# Patient Record
Sex: FEMALE | Race: WHITE | NOT HISPANIC OR LATINO
[De-identification: names, ages, dates, MRNs, and addresses within clinical notes are randomized per-mention and may not be internally consistent; named-entity substitution may affect disease eponyms.]

---

## 2023-07-18 ENCOUNTER — APPOINTMENT (OUTPATIENT)
Dept: SURGERY | Facility: CLINIC | Age: 68
End: 2023-07-18

## 2023-08-22 PROBLEM — Z00.00 ENCOUNTER FOR PREVENTIVE HEALTH EXAMINATION: Status: ACTIVE | Noted: 2023-08-22

## 2023-11-07 ENCOUNTER — APPOINTMENT (OUTPATIENT)
Dept: SURGERY | Facility: CLINIC | Age: 68
End: 2023-11-07
Payer: MEDICARE

## 2023-11-07 VITALS — WEIGHT: 220 LBS | HEIGHT: 64 IN | BODY MASS INDEX: 37.56 KG/M2

## 2023-11-07 PROCEDURE — 99204 OFFICE O/P NEW MOD 45 MIN: CPT

## 2023-11-29 ENCOUNTER — OUTPATIENT (OUTPATIENT)
Dept: OUTPATIENT SERVICES | Facility: HOSPITAL | Age: 68
LOS: 1 days | End: 2023-11-29
Payer: MEDICARE

## 2023-11-29 VITALS
SYSTOLIC BLOOD PRESSURE: 162 MMHG | OXYGEN SATURATION: 98 % | RESPIRATION RATE: 18 BRPM | HEIGHT: 64 IN | HEART RATE: 80 BPM | WEIGHT: 218.92 LBS | TEMPERATURE: 98 F | DIASTOLIC BLOOD PRESSURE: 74 MMHG

## 2023-11-29 DIAGNOSIS — Z98.891 HISTORY OF UTERINE SCAR FROM PREVIOUS SURGERY: Chronic | ICD-10-CM

## 2023-11-29 DIAGNOSIS — Z01.818 ENCOUNTER FOR OTHER PREPROCEDURAL EXAMINATION: ICD-10-CM

## 2023-11-29 DIAGNOSIS — K43.9 VENTRAL HERNIA WITHOUT OBSTRUCTION OR GANGRENE: ICD-10-CM

## 2023-11-29 DIAGNOSIS — Z98.890 OTHER SPECIFIED POSTPROCEDURAL STATES: Chronic | ICD-10-CM

## 2023-11-29 LAB
ALBUMIN SERPL ELPH-MCNC: 4.3 G/DL — SIGNIFICANT CHANGE UP (ref 3.5–5.2)
ALBUMIN SERPL ELPH-MCNC: 4.3 G/DL — SIGNIFICANT CHANGE UP (ref 3.5–5.2)
ALP SERPL-CCNC: 139 U/L — HIGH (ref 30–115)
ALP SERPL-CCNC: 139 U/L — HIGH (ref 30–115)
ALT FLD-CCNC: 12 U/L — SIGNIFICANT CHANGE UP (ref 0–41)
ALT FLD-CCNC: 12 U/L — SIGNIFICANT CHANGE UP (ref 0–41)
ANION GAP SERPL CALC-SCNC: 12 MMOL/L — SIGNIFICANT CHANGE UP (ref 7–14)
ANION GAP SERPL CALC-SCNC: 12 MMOL/L — SIGNIFICANT CHANGE UP (ref 7–14)
APPEARANCE UR: CLEAR — SIGNIFICANT CHANGE UP
APPEARANCE UR: CLEAR — SIGNIFICANT CHANGE UP
APTT BLD: 33.2 SEC — SIGNIFICANT CHANGE UP (ref 27–39.2)
APTT BLD: 33.2 SEC — SIGNIFICANT CHANGE UP (ref 27–39.2)
AST SERPL-CCNC: 14 U/L — SIGNIFICANT CHANGE UP (ref 0–41)
AST SERPL-CCNC: 14 U/L — SIGNIFICANT CHANGE UP (ref 0–41)
BASOPHILS # BLD AUTO: 0.04 K/UL — SIGNIFICANT CHANGE UP (ref 0–0.2)
BASOPHILS # BLD AUTO: 0.04 K/UL — SIGNIFICANT CHANGE UP (ref 0–0.2)
BASOPHILS NFR BLD AUTO: 0.5 % — SIGNIFICANT CHANGE UP (ref 0–1)
BASOPHILS NFR BLD AUTO: 0.5 % — SIGNIFICANT CHANGE UP (ref 0–1)
BILIRUB SERPL-MCNC: 0.4 MG/DL — SIGNIFICANT CHANGE UP (ref 0.2–1.2)
BILIRUB SERPL-MCNC: 0.4 MG/DL — SIGNIFICANT CHANGE UP (ref 0.2–1.2)
BILIRUB UR-MCNC: NEGATIVE — SIGNIFICANT CHANGE UP
BILIRUB UR-MCNC: NEGATIVE — SIGNIFICANT CHANGE UP
BUN SERPL-MCNC: 8 MG/DL — LOW (ref 10–20)
BUN SERPL-MCNC: 8 MG/DL — LOW (ref 10–20)
CALCIUM SERPL-MCNC: 10 MG/DL — SIGNIFICANT CHANGE UP (ref 8.4–10.5)
CALCIUM SERPL-MCNC: 10 MG/DL — SIGNIFICANT CHANGE UP (ref 8.4–10.5)
CHLORIDE SERPL-SCNC: 94 MMOL/L — LOW (ref 98–110)
CHLORIDE SERPL-SCNC: 94 MMOL/L — LOW (ref 98–110)
CO2 SERPL-SCNC: 31 MMOL/L — SIGNIFICANT CHANGE UP (ref 17–32)
CO2 SERPL-SCNC: 31 MMOL/L — SIGNIFICANT CHANGE UP (ref 17–32)
COLOR SPEC: YELLOW — SIGNIFICANT CHANGE UP
COLOR SPEC: YELLOW — SIGNIFICANT CHANGE UP
CREAT SERPL-MCNC: <0.5 MG/DL — LOW (ref 0.7–1.5)
CREAT SERPL-MCNC: <0.5 MG/DL — LOW (ref 0.7–1.5)
DIFF PNL FLD: NEGATIVE — SIGNIFICANT CHANGE UP
DIFF PNL FLD: NEGATIVE — SIGNIFICANT CHANGE UP
EGFR: 103 ML/MIN/1.73M2 — SIGNIFICANT CHANGE UP
EGFR: 103 ML/MIN/1.73M2 — SIGNIFICANT CHANGE UP
EOSINOPHIL # BLD AUTO: 0.17 K/UL — SIGNIFICANT CHANGE UP (ref 0–0.7)
EOSINOPHIL # BLD AUTO: 0.17 K/UL — SIGNIFICANT CHANGE UP (ref 0–0.7)
EOSINOPHIL NFR BLD AUTO: 2.2 % — SIGNIFICANT CHANGE UP (ref 0–8)
EOSINOPHIL NFR BLD AUTO: 2.2 % — SIGNIFICANT CHANGE UP (ref 0–8)
GLUCOSE SERPL-MCNC: 88 MG/DL — SIGNIFICANT CHANGE UP (ref 70–99)
GLUCOSE SERPL-MCNC: 88 MG/DL — SIGNIFICANT CHANGE UP (ref 70–99)
GLUCOSE UR QL: NEGATIVE MG/DL — SIGNIFICANT CHANGE UP
GLUCOSE UR QL: NEGATIVE MG/DL — SIGNIFICANT CHANGE UP
HCT VFR BLD CALC: 48.2 % — HIGH (ref 37–47)
HCT VFR BLD CALC: 48.2 % — HIGH (ref 37–47)
HGB BLD-MCNC: 15.9 G/DL — SIGNIFICANT CHANGE UP (ref 12–16)
HGB BLD-MCNC: 15.9 G/DL — SIGNIFICANT CHANGE UP (ref 12–16)
IMM GRANULOCYTES NFR BLD AUTO: 0.5 % — HIGH (ref 0.1–0.3)
IMM GRANULOCYTES NFR BLD AUTO: 0.5 % — HIGH (ref 0.1–0.3)
INR BLD: 1 RATIO — SIGNIFICANT CHANGE UP (ref 0.65–1.3)
INR BLD: 1 RATIO — SIGNIFICANT CHANGE UP (ref 0.65–1.3)
KETONES UR-MCNC: NEGATIVE MG/DL — SIGNIFICANT CHANGE UP
KETONES UR-MCNC: NEGATIVE MG/DL — SIGNIFICANT CHANGE UP
LEUKOCYTE ESTERASE UR-ACNC: NEGATIVE — SIGNIFICANT CHANGE UP
LEUKOCYTE ESTERASE UR-ACNC: NEGATIVE — SIGNIFICANT CHANGE UP
LYMPHOCYTES # BLD AUTO: 1.79 K/UL — SIGNIFICANT CHANGE UP (ref 1.2–3.4)
LYMPHOCYTES # BLD AUTO: 1.79 K/UL — SIGNIFICANT CHANGE UP (ref 1.2–3.4)
LYMPHOCYTES # BLD AUTO: 23.6 % — SIGNIFICANT CHANGE UP (ref 20.5–51.1)
LYMPHOCYTES # BLD AUTO: 23.6 % — SIGNIFICANT CHANGE UP (ref 20.5–51.1)
MCHC RBC-ENTMCNC: 29 PG — SIGNIFICANT CHANGE UP (ref 27–31)
MCHC RBC-ENTMCNC: 29 PG — SIGNIFICANT CHANGE UP (ref 27–31)
MCHC RBC-ENTMCNC: 33 G/DL — SIGNIFICANT CHANGE UP (ref 32–37)
MCHC RBC-ENTMCNC: 33 G/DL — SIGNIFICANT CHANGE UP (ref 32–37)
MCV RBC AUTO: 88 FL — SIGNIFICANT CHANGE UP (ref 81–99)
MCV RBC AUTO: 88 FL — SIGNIFICANT CHANGE UP (ref 81–99)
MONOCYTES # BLD AUTO: 0.45 K/UL — SIGNIFICANT CHANGE UP (ref 0.1–0.6)
MONOCYTES # BLD AUTO: 0.45 K/UL — SIGNIFICANT CHANGE UP (ref 0.1–0.6)
MONOCYTES NFR BLD AUTO: 5.9 % — SIGNIFICANT CHANGE UP (ref 1.7–9.3)
MONOCYTES NFR BLD AUTO: 5.9 % — SIGNIFICANT CHANGE UP (ref 1.7–9.3)
NEUTROPHILS # BLD AUTO: 5.08 K/UL — SIGNIFICANT CHANGE UP (ref 1.4–6.5)
NEUTROPHILS # BLD AUTO: 5.08 K/UL — SIGNIFICANT CHANGE UP (ref 1.4–6.5)
NEUTROPHILS NFR BLD AUTO: 67.3 % — SIGNIFICANT CHANGE UP (ref 42.2–75.2)
NEUTROPHILS NFR BLD AUTO: 67.3 % — SIGNIFICANT CHANGE UP (ref 42.2–75.2)
NITRITE UR-MCNC: NEGATIVE — SIGNIFICANT CHANGE UP
NITRITE UR-MCNC: NEGATIVE — SIGNIFICANT CHANGE UP
NRBC # BLD: 0 /100 WBCS — SIGNIFICANT CHANGE UP (ref 0–0)
NRBC # BLD: 0 /100 WBCS — SIGNIFICANT CHANGE UP (ref 0–0)
PH UR: 7.5 — SIGNIFICANT CHANGE UP (ref 5–8)
PH UR: 7.5 — SIGNIFICANT CHANGE UP (ref 5–8)
PLATELET # BLD AUTO: 248 K/UL — SIGNIFICANT CHANGE UP (ref 130–400)
PLATELET # BLD AUTO: 248 K/UL — SIGNIFICANT CHANGE UP (ref 130–400)
PMV BLD: 9.8 FL — SIGNIFICANT CHANGE UP (ref 7.4–10.4)
PMV BLD: 9.8 FL — SIGNIFICANT CHANGE UP (ref 7.4–10.4)
POTASSIUM SERPL-MCNC: 4.1 MMOL/L — SIGNIFICANT CHANGE UP (ref 3.5–5)
POTASSIUM SERPL-MCNC: 4.1 MMOL/L — SIGNIFICANT CHANGE UP (ref 3.5–5)
POTASSIUM SERPL-SCNC: 4.1 MMOL/L — SIGNIFICANT CHANGE UP (ref 3.5–5)
POTASSIUM SERPL-SCNC: 4.1 MMOL/L — SIGNIFICANT CHANGE UP (ref 3.5–5)
PROT SERPL-MCNC: 6.7 G/DL — SIGNIFICANT CHANGE UP (ref 6–8)
PROT SERPL-MCNC: 6.7 G/DL — SIGNIFICANT CHANGE UP (ref 6–8)
PROT UR-MCNC: NEGATIVE MG/DL — SIGNIFICANT CHANGE UP
PROT UR-MCNC: NEGATIVE MG/DL — SIGNIFICANT CHANGE UP
PROTHROM AB SERPL-ACNC: 11.4 SEC — SIGNIFICANT CHANGE UP (ref 9.95–12.87)
PROTHROM AB SERPL-ACNC: 11.4 SEC — SIGNIFICANT CHANGE UP (ref 9.95–12.87)
RBC # BLD: 5.48 M/UL — HIGH (ref 4.2–5.4)
RBC # BLD: 5.48 M/UL — HIGH (ref 4.2–5.4)
RBC # FLD: 14.8 % — HIGH (ref 11.5–14.5)
RBC # FLD: 14.8 % — HIGH (ref 11.5–14.5)
SODIUM SERPL-SCNC: 137 MMOL/L — SIGNIFICANT CHANGE UP (ref 135–146)
SODIUM SERPL-SCNC: 137 MMOL/L — SIGNIFICANT CHANGE UP (ref 135–146)
SP GR SPEC: 1.01 — SIGNIFICANT CHANGE UP (ref 1–1.03)
SP GR SPEC: 1.01 — SIGNIFICANT CHANGE UP (ref 1–1.03)
UROBILINOGEN FLD QL: 0.2 MG/DL — SIGNIFICANT CHANGE UP (ref 0.2–1)
UROBILINOGEN FLD QL: 0.2 MG/DL — SIGNIFICANT CHANGE UP (ref 0.2–1)
WBC # BLD: 7.57 K/UL — SIGNIFICANT CHANGE UP (ref 4.8–10.8)
WBC # BLD: 7.57 K/UL — SIGNIFICANT CHANGE UP (ref 4.8–10.8)
WBC # FLD AUTO: 7.57 K/UL — SIGNIFICANT CHANGE UP (ref 4.8–10.8)
WBC # FLD AUTO: 7.57 K/UL — SIGNIFICANT CHANGE UP (ref 4.8–10.8)

## 2023-11-29 PROCEDURE — 93010 ELECTROCARDIOGRAM REPORT: CPT

## 2023-11-29 PROCEDURE — 85610 PROTHROMBIN TIME: CPT

## 2023-11-29 PROCEDURE — 80053 COMPREHEN METABOLIC PANEL: CPT

## 2023-11-29 PROCEDURE — 36415 COLL VENOUS BLD VENIPUNCTURE: CPT

## 2023-11-29 PROCEDURE — 81003 URINALYSIS AUTO W/O SCOPE: CPT

## 2023-11-29 PROCEDURE — 99214 OFFICE O/P EST MOD 30 MIN: CPT | Mod: 25

## 2023-11-29 PROCEDURE — 85730 THROMBOPLASTIN TIME PARTIAL: CPT

## 2023-11-29 PROCEDURE — 85025 COMPLETE CBC W/AUTO DIFF WBC: CPT

## 2023-11-29 PROCEDURE — 93005 ELECTROCARDIOGRAM TRACING: CPT

## 2023-11-29 NOTE — H&P PST ADULT - HISTORY OF PRESENT ILLNESS
PATIENT CURRENTLY DENIES CHEST PAIN PALPITATIONS,  DYSURIA, OR UPPER RESPIRATORY INFECTION IN PAST 2 WEEKS  PT IS DYSPNEA WITH MINIMAL EXERTION; SHE USES A WALKER FOR STABILITY       Denies travel outside the [place] in the past 30 days  Patient denies any signs or symptoms of COVID 19 and denies contact with known positive individuals.         Anesthesia Alert  YES--Difficult Airway CLASS IV  NO--History of neck surgery or radiation  NO--Limited ROM of neck  NO--History of Malignant hyperthermia  NO--No personal or family history of Pseudocholinesterase deficiency.  NO--Prior Anesthesia Complication  NO--Latex Allergy  YES--Loose teeth FULL DENTURES   NO--History of Rheumatoid Arthritis  NO--Bleeding risk  NO--LATISHA  NO--Other_____    [facility] Activity Status Index (DASI)   RESULT SUMMARY:  7.2 points  The higher the score (maximum 58.2), the higher the functional status.    3.63 METs    INPUTS:  Take care of self —> 2.75 = Yes  Walk indoors —> 1.75 = Yes  Walk 1&ndash;2 blocks on level ground —> 0 = No  Climb a flight of stairs or walk up a hill —> 0 = No  Run a short distance —> 0 = No  Do light work around the house —> 2.7 = Yes  Do moderate work around the house —> 0 = No  Do heavy work around the house —> 0 = No  Do yardwork —> 0 = No  Have sexual relations —> 0 = No  Participate in moderate recreational activities —> 0 = No  Participate in strenuous sports —> 0 = No    Revised Cardiac Risk Index for Pre-Operative Risk  RESULT SUMMARY:  1 points  Class II Risk    6.0 %  30-day risk of death, MI, or cardiac arrest    INPUTS:  Elevated-risk surgery —> 0 = No  History of ischemic heart disease —> 0 = No  History of congestive heart failure —> 1 = Yes  History of cerebrovascular disease —> 0 = No  Pre-operative treatment with insulin —> 0 = No  Pre-operative creatinine >2 mg/dL / 176.8 µmol/L —> 0 = No        PT DENIES ANY RASHES, ABRASION, OR OPEN WOUNDS OR CUTS    AS PER THE PT, THIS IS HIS/HER COMPLETE MEDICAL AND SURGICAL HX, INCLUDING MEDICATIONS PRESCRIBED AND OVER THE COUNTER    Patient verbalized understanding of instructions and was given the opportunity to ask questions and have them answered.    pt denies any suicidal ideation or thoughts, pt states not a threat to self or others

## 2023-11-29 NOTE — H&P PST ADULT - MUSCULOSKELETAL
USES WALKER/ROM intact/strength 5/5 bilateral upper extremities/strength 5/5 bilateral lower extremities/abnormal gait

## 2023-11-29 NOTE — H&P PST ADULT - REASON FOR ADMISSION
66 Y/O F WITH PMHX BIPOLAR DISORDER, ASTHMA, COPD, +SMOKER, ?CHF, SCHEDULED FOR PAST FOR RECURRENT PERIUMBILICAL VENTRAL HERNIA REPAIR WITH MESH UNDER LSB WITH DR LINK ON 12/13/23. PT REPORTS RECENT UMBILICAL HERNIA REPAIR 6/2023 AT [email or web address] SHE STATES THE PAIN IN HER ABDOMEN HAS BEEN CONSTANT SINCE SURGERY AND THE HERNIA HAS RECURRED. 68 Y/O F WITH PMHX BIPOLAR DISORDER, ASTHMA, COPD, +SMOKER, ?CHF, SCHEDULED FOR PAST FOR RECURRENT PERIUMBILICAL VENTRAL HERNIA REPAIR WITH MESH UNDER LSB WITH DR LINK ON 12/13/23. PT REPORTS RECENT UMBILICAL HERNIA REPAIR 6/2023 AT [facility]. SHE STATES THE PAIN IN HER ABDOMEN HAS BEEN CONSTANT SINCE SURGERY AND THE HERNIA HAS RECURRED. 68 Y/O F WITH PMHX BIPOLAR DISORDER, ASTHMA, COPD, +SMOKER, ?CHF, SCHEDULED FOR PAST FOR RECURRENT PERIUMBILICAL VENTRAL HERNIA REPAIR WITH MESH UNDER LSB WITH DR LINK ON 12/13/23. PT REPORTS RECENT UMBILICAL HERNIA REPAIR 6/2023 AT [facility]. SHE STATES THE PAIN IN HER ABDOMEN HAS BEEN CONSTANT SINCE SURGERY AND THE HERNIA HAS RECURRED.

## 2023-11-29 NOTE — H&P PST ADULT - PRO ARRIVE FROM
[facility] residence/assisted living facility [facility] residence/assisted living facility [facility] residence/assisted living facility

## 2023-11-29 NOTE — H&P PST ADULT - NSICDXPASTMEDICALHX_GEN_ALL_CORE_FT
PAST MEDICAL HISTORY:  Asthma     Bipolar disorder     CHF (congestive heart failure)     COPD (chronic obstructive pulmonary disease)     Lymphedema

## 2023-11-29 NOTE — H&P PST ADULT - NSANTHOSAYNRD_GEN_A_CORE
No. LATISHA screening performed.  STOP BANG Legend: 0-2 = LOW Risk; 3-4 = INTERMEDIATE Risk; 5-8 = HIGH Risk

## 2023-11-30 DIAGNOSIS — Z01.818 ENCOUNTER FOR OTHER PREPROCEDURAL EXAMINATION: ICD-10-CM

## 2023-11-30 DIAGNOSIS — K43.9 VENTRAL HERNIA WITHOUT OBSTRUCTION OR GANGRENE: ICD-10-CM

## 2023-12-12 NOTE — ASU PATIENT PROFILE, ADULT - FALL HARM RISK - HARM RISK INTERVENTIONS
Assistance OOB with selected safe patient handling equipment/Communicate Risk of Fall with Harm to all staff/Discuss with provider need for PT consult/Monitor gait and stability/Provide patient with walking aids - walker, cane, crutches/Reinforce activity limits and safety measures with patient and family/Tailored Fall Risk Interventions/Visual Cue: Yellow wristband and red socks/Bed in lowest position, wheels locked, appropriate side rails in place/Call bell, personal items and telephone in reach/Instruct patient to call for assistance before getting out of bed or chair/Non-slip footwear when patient is out of bed/[place] to call system/Physically safe environment - no spills, clutter or unnecessary equipment/Purposeful Proactive Rounding/Room/bathroom lighting operational, light cord in reach Assistance OOB with selected safe patient handling equipment/Communicate Risk of Fall with Harm to all staff/Discuss with provider need for PT consult/Monitor gait and stability/Provide patient with walking aids - walker, cane, crutches/Reinforce activity limits and safety measures with patient and family/Tailored Fall Risk Interventions/Visual Cue: Yellow wristband and red socks/Bed in lowest position, wheels locked, appropriate side rails in place/Call bell, personal items and telephone in reach/Instruct patient to call for assistance before getting out of bed or chair/Non-slip footwear when patient is out of bed/[place] to call system/Physically safe environment - no spills, clutter or unnecessary equipment/Purposeful Proactive Rounding/Room/bathroom lighting operational, light cord in reach

## 2023-12-13 ENCOUNTER — APPOINTMENT (OUTPATIENT)
Dept: SURGERY | Facility: AMBULATORY SURGERY CENTER | Age: 68
End: 2023-12-13
Payer: MEDICARE

## 2023-12-13 ENCOUNTER — TRANSCRIPTION ENCOUNTER (OUTPATIENT)
Age: 68
End: 2023-12-13

## 2023-12-13 ENCOUNTER — RESULT REVIEW (OUTPATIENT)
Age: 68
End: 2023-12-13

## 2023-12-13 ENCOUNTER — OUTPATIENT (OUTPATIENT)
Dept: OUTPATIENT SERVICES | Facility: HOSPITAL | Age: 68
LOS: 1 days | Discharge: HOME | End: 2023-12-13
Payer: MEDICARE

## 2023-12-13 VITALS
HEART RATE: 77 BPM | WEIGHT: 218.04 LBS | DIASTOLIC BLOOD PRESSURE: 69 MMHG | SYSTOLIC BLOOD PRESSURE: 145 MMHG | HEIGHT: 64 IN

## 2023-12-13 VITALS
HEART RATE: 60 BPM | OXYGEN SATURATION: 99 % | SYSTOLIC BLOOD PRESSURE: 129 MMHG | RESPIRATION RATE: 18 BRPM | DIASTOLIC BLOOD PRESSURE: 61 MMHG

## 2023-12-13 DIAGNOSIS — F17.210 NICOTINE DEPENDENCE, CIGARETTES, UNCOMPLICATED: ICD-10-CM

## 2023-12-13 DIAGNOSIS — N73.6 FEMALE PELVIC PERITONEAL ADHESIONS (POSTINFECTIVE): ICD-10-CM

## 2023-12-13 DIAGNOSIS — K43.9 VENTRAL HERNIA WITHOUT OBSTRUCTION OR GANGRENE: ICD-10-CM

## 2023-12-13 DIAGNOSIS — Z98.891 HISTORY OF UTERINE SCAR FROM PREVIOUS SURGERY: Chronic | ICD-10-CM

## 2023-12-13 DIAGNOSIS — J44.9 CHRONIC OBSTRUCTIVE PULMONARY DISEASE, UNSPECIFIED: ICD-10-CM

## 2023-12-13 DIAGNOSIS — K43.2 INCISIONAL HERNIA WITHOUT OBSTRUCTION OR GANGRENE: ICD-10-CM

## 2023-12-13 DIAGNOSIS — F31.9 BIPOLAR DISORDER, UNSPECIFIED: ICD-10-CM

## 2023-12-13 DIAGNOSIS — Z88.0 ALLERGY STATUS TO PENICILLIN: ICD-10-CM

## 2023-12-13 DIAGNOSIS — I50.9 HEART FAILURE, UNSPECIFIED: ICD-10-CM

## 2023-12-13 DIAGNOSIS — Z98.890 OTHER SPECIFIED POSTPROCEDURAL STATES: Chronic | ICD-10-CM

## 2023-12-13 PROCEDURE — 49616 RPR AA HRN RCR 3-10 NCR/STRN: CPT

## 2023-12-13 PROCEDURE — 88304 TISSUE EXAM BY PATHOLOGIST: CPT | Mod: 26

## 2023-12-13 PROCEDURE — 88304 TISSUE EXAM BY PATHOLOGIST: CPT

## 2023-12-13 PROCEDURE — 20525 RMVL FB MUSC/TDN DEEP/COMP: CPT | Mod: XS

## 2023-12-13 PROCEDURE — C1781: CPT

## 2023-12-13 RX ORDER — SODIUM CHLORIDE 9 MG/ML
1000 INJECTION, SOLUTION INTRAVENOUS
Refills: 0 | Status: DISCONTINUED | OUTPATIENT
Start: 2023-12-13 | End: 2023-12-13

## 2023-12-13 RX ORDER — ARIPIPRAZOLE 15 MG/1
1 TABLET ORAL
Refills: 0 | DISCHARGE

## 2023-12-13 RX ORDER — HYDROMORPHONE HYDROCHLORIDE 2 MG/ML
0.5 INJECTION INTRAMUSCULAR; INTRAVENOUS; SUBCUTANEOUS
Refills: 0 | Status: DISCONTINUED | OUTPATIENT
Start: 2023-12-13 | End: 2023-12-13

## 2023-12-13 RX ORDER — ONDANSETRON 8 MG/1
4 TABLET, FILM COATED ORAL ONCE
Refills: 0 | Status: DISCONTINUED | OUTPATIENT
Start: 2023-12-13 | End: 2023-12-13

## 2023-12-13 RX ORDER — TRIHEXYPHENIDYL HCL 2 MG
2 TABLET ORAL
Refills: 0 | DISCHARGE

## 2023-12-13 NOTE — ASU DISCHARGE PLAN (ADULT/PEDIATRIC) - PROVIDER TOKENS
PROVIDER:[TOKEN:[[id number]],SCHEDULEDAPPT:[12/20/2023],SCHEDULEDAPPTTIME:[02:00 PM]] PROVIDER:[TOKEN:[[id number]],SCHEDULEDAPPT:[12/20/2023],SCHEDULEDAPPTTIME:[02:00 PM]]

## 2023-12-13 NOTE — ASU PREOP CHECKLIST - COMMENTS
pt was sucking on a sponge when admitted - says she has dry mouth . advised pt to not suck on the sponge. pt threw away @ 10:45 am

## 2023-12-13 NOTE — BRIEF OPERATIVE NOTE - NSICDXBRIEFPOSTOP_GEN_ALL_CORE_FT
POST-OP DIAGNOSIS:  Recurrent ventral hernia with incarceration 13-Dec-2023 13:48:09  Kodi Noguera  Suture granuloma 13-Dec-2023 13:48:16  Kodi Noguera

## 2023-12-13 NOTE — PRE-ANESTHESIA EVALUATION ADULT - BP NONINVASIVE DIASTOLIC (MM HG)
Bacterial Vaginosis    You have a vaginal infection called bacterial vaginosis (BV). Both good and bad bacteria are present in a healthy vagina. BV occurs when these bacteria get out of balance. The number of bad bacteria increase. And the number of good bacteria decrease. Although BV is associated with sexual activity, it is not a sexually transmitted disease.  BV may or may not cause symptoms. If symptoms do occur, they can include:  · Thin, gray, milky-white, or sometimes green discharge  · Unpleasant odor or “fishy” smell  · Itching, burning, or pain in or around the vagina  It is not known what causes BV, but certain factors can make the problem more likely. This can include:  · Douching  · Having sex with a new partner  · Having sex with more than one partner  BV will sometimes go away on its own. But treatment is usually recommended. This is because untreated BV can increase the risk of more serious health problems such as:  · Pelvic inflammatory disease (PID)  ·  delivery (giving birth to a baby early if you’re pregnant)  · HIV and certain other sexually transmitted diseases (STDs)  · Infection after surgery on the reproductive organs  Home care  General care  · BV is most often treated with medicines called antibiotics. These may be given as pills or as a vaginal cream. If antibiotics are prescribed, be sure to use them exactly as directed. Also, be sure to complete all of the medicine, even if your symptoms go away.  · Don't douche or having sex during treatment.  · If you have sex with a female partner, ask your healthcare provider if she should also be treated.  Prevention  · Don't douche.  · Don't have sex. If you do have sex, then take steps to lower your risk:  ? Use condoms when having sex.  ? Limit the number of sexual partners you have.  Follow-up care  Follow up with your healthcare provider, or as advised.  When to seek medical advice  Call your healthcare provider right away  if:  · You have a fever of 100.4ºF (38ºC) or higher, or as directed by your provider.  · Your symptoms worsen, or they don’t go away within a few days of starting treatment.  · You have new pain in the lower belly or pelvic region.  · You have side effects that bother you or a reaction to the pills or cream you’re prescribed.  · You or any partners you have sex with have new symptoms, such as a rash, joint pain, or sores.  Date Last Reviewed: 10/1/2017  © 6124-7057 [employer]. [address] [place] [address]. All rights reserved. This information is not intended as a substitute for professional medical care. Always follow your healthcare professional's instructions.          Yeast Infection (Candida Vaginal Infection)    You have a Candida vaginal infection. This is also known as a yeast infection. It is most often caused by a type of yeast (fungus) called Candida. Candida are normally found in the vagina. But if they increase in number, this can lead to infection and cause symptoms.  Symptoms of a yeast infection can include:  · Clumpy or thin, white discharge, which may look like cottage cheese  · Itching or burning  · Burning with urination  Certain factors can make a yeast infection more likely. These can include:  · Taking certain medicines, such as antibiotics or birth control pills  · Pregnancy  · Diabetes  · Weak immune system  A yeast infection is most often treated with antifungal medicine. This may be given as a vaginal cream or pills you take by mouth. Treatment may last for about 1 to 7 days. Women with severe or recurrent infections may need longer courses of treatment.  Home care  · If you’re prescribed medicine, be sure to use it as directed. Finish all of the medicine, even if your symptoms go away. Note: Don’t try to treat yourself using over-the-counter products without talking to your provider first. He or she will let you know if this is a good option for you.  · Ask  your provider what steps you can take to help reduce your risk of having a yeast infection in the future.  Follow-up care  Follow up with your healthcare provider, or as directed.  When to seek medical advice  Call your healthcare provider right away if:  · You have a fever of 100.4ºF (38ºC) or higher, or as directed by your provider.  · Your symptoms worsen, or they don’t go away within a few days of starting treatment.  · You have new pain in the lower belly or pelvic region.  · You have side effects that bother you or a reaction to the cream or pills you’re prescribed.  · You or any partners you have sex with have new symptoms, such as a rash, joint pain, or sores.  Date Last Reviewed: 10/1/2017  © 8794-3430 [employer]. [address] [place] [address], [place] [address]. All rights reserved. This information is not intended as a substitute for professional medical care. Always follow your healthcare professional's instructions.         69

## 2023-12-13 NOTE — ASU DISCHARGE PLAN (ADULT/PEDIATRIC) - DO NOT DRIVE IF TAKING PAIN MEDICATION
Patient had to get to class and was not able to be seen.   Kasumi-sou results for medication to be scanned to chart.   Will send Wellbutrin 150 mg XL to be taken totaling 300 mg  Xanax sent as needed until she can reschedule to discuss further. According to the results of Kasumi-sou Buspar should have worked well for her.   
NULL

## 2023-12-13 NOTE — BRIEF OPERATIVE NOTE - NSICDXBRIEFPROCEDURE_GEN_ALL_CORE_FT
PROCEDURES:  Repair of anterior abdominal hernia(s) (ie, epigastric, incisional, ventral, umbilical, Spigelian), 13-Dec-2023 13:48:24  Kodi Noguera  Removal, foreign body, muscle, deep 13-Dec-2023 13:48:53  Kodi Noguera

## 2023-12-13 NOTE — ASU DISCHARGE PLAN (ADULT/PEDIATRIC) - CARE PROVIDER_API CALL
Kodi Noguera  Surgery  [address] [place] [id number]  Phone: [phone]  Fax: [phone]  Scheduled Appointment: 12/20/2023 02:00 PM   Kodi Noguera  Surgery  [address] [place] [id number]  Phone: [phone]  Fax: [phone]  Scheduled Appointment: 12/20/2023 02:00 PM

## 2023-12-13 NOTE — ASU DISCHARGE PLAN (ADULT/PEDIATRIC) - COMMENTS
- You will see [facility] Physician Assistant, Venice Mustafa, at your postoperative visit noted above.

## 2023-12-13 NOTE — ASU PREOP CHECKLIST - HEIGHT IN FEET
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  IV antibiotics switched from ceftriaxone to vancomycin due to concern for MARIBEL  Per ID, consider primary odontogenic source given poor dentition  JESSICA, 5/19/23: EF 65%  Normal systolic fxn  Dilated LA  No PFO  No thrombous in DAREN  AV leaflets moderately thickened, moderately calcified, severely reduced mobility, mod regurg, mod to severe stenosis  AV vegetations (3 small, mobile vegetations)- suspect primary odontogenic source  No definitive occult intra-abdominal source  WBC 12 94 (history of persistent leukocytosis)  6/12: CRP 42 1 (prev 19 2), ESR 42 (prev 36)  Afebrile    Plan:  · Per ID OP, plan for total 6 weeks of therapy from bacteremia clearance through 6/30/23  · Discontinued vancomycin in setting of negative MRSA  · ID consulted, recs appreciated  · No indications to check bcx and MRSA at this time  · Maintain current RUE PICC line until last IV abx  · Upon d/c, will need to f/u [facility], ID, cardiology/CT surgery  5

## 2023-12-13 NOTE — ASU DISCHARGE PLAN (ADULT/PEDIATRIC) - ASU DC SPECIAL INSTRUCTIONSFT
Diet    Eat light on the day of surgery. Nausea and vomiting can occur after anesthesia,   but usually resolve within 24 hours.  Resume normal diet the following day.      Activity    Rest!  No heavy lifting or strenuous activity.    Medications    Ibuprofen (Advil, Motrin), Naprosyn (Aleve) and/or Extra-Strength Tylenol for pain.  Tramadol for severe pain only.  Your prescription was sent electronically to your pharmacy.  Remember, Tramadol is a strong narcotic-like pain reliever which can cause drowsiness, upset stomach and constipation.  It should always be taken with food.  You can use stool softener (Mineral Oil) or laxative (MiraLax or Dulcolax) if constipated.  An antibiotic is given during surgery.  No antibiotic needed at home.  Resume all previous medications.  Resume blood thinners the day after surgery unless told otherwise.    Wound Care    Leave surgical dressing in place.  May shower (dressing is waterproof.)  No pool, ocean, lake, hot-tub or bath for 3 weeks. If you were given an abdominal binder, please wear it as much as possible (day & night) for 2 weeks, but you must remove it for showers.  Ice packs to the area intermittently for several days help with pain and swelling.  Bruising (“black and blue”) is common.  Treatment is…Ice & Rest.       - You will see [facility] Physician Assistant, Venice Mustafa, at your postoperative visit noted below.

## 2023-12-13 NOTE — BRIEF OPERATIVE NOTE - VENOUS THROMBOEMBOLISM PROPHYLAXIS THERAPY
DAMARI GUERRERO  MRN-[id number]  Patient is a 74y old  Female who presents with a chief complaint of Vomiting (24 Oct 2022 10:56)    HPI:  75 y/o F with pmhx of COPD on home O2(3L) with severe pHTN, MVR/TVR , AF s/p ablation,  OHS/MIGUEL on home biPAP, HTN, HLD who presents with one week of vomiting with poor oral intake also complaining of SOB and chest pain.  She also states she has been taking prednisone on/off for the past 3 months for chest tightness and does endorse some weight gain.     In the ED pt found to have A Flutter and given Amio 400 po and Dig loaded.  (23 Oct 2022 04:23)      Hospital Course:    24 HOUR EVENTS:    REVIEW OF SYSTEMS:    CONSTITUTIONAL: No weakness, fevers or chills  EYES/ENT: No visual changes;  No vertigo or throat pain   NECK: No pain or stiffness  RESPIRATORY: No cough, wheezing, hemoptysis; No shortness of breath  CARDIOVASCULAR: No chest pain or palpitations  GASTROINTESTINAL: No abdominal or epigastric pain. No nausea, vomiting, or hematemesis; No diarrhea or constipation. No melena or hematochezia.  GENITOURINARY: No dysuria, frequency or hematuria  NEUROLOGICAL: No numbness or weakness  SKIN: No itching, rashes      ICU Vital Signs Last 24 Hrs  T(C): 36.7 (24 Oct 2022 16:00), Max: 36.7 (24 Oct 2022 12:00)  T(F): 98.1 (24 Oct 2022 16:00), Max: 98.1 (24 Oct 2022 12:00)  HR: 125 (24 Oct 2022 18:00) (64 - 130)  BP: 93/58 (24 Oct 2022 17:00) (92/62 - 135/83)  BP(mean): 70 (24 Oct 2022 17:00) (70 - 105)  ABP: --  ABP(mean): --  RR: 32 (24 Oct 2022 18:00) (15 - 67)  SpO2: 88% (24 Oct 2022 18:00) (86% - 100%)    O2 Parameters below as of 24 Oct 2022 03:00  Patient On (Oxygen Delivery Method): nasal cannula w/ humidification  O2 Flow (L/min): 4        Mode: AC/ CMV (Assist Control/ Continuous Mandatory Ventilation), RR (machine): 16, TV (machine): 500, FiO2: 40, PEEP: 6, ITime: 1  CVP(mm Hg): --  CO: --  CI: --  PA: --  PA(mean): --  PA(direct): --  PCWP: --  LA: --  RA: --  SVR: --  SVRI: --  PVR: --  PVRI: --  I&O's Summary    23 Oct 2022 07:01  -  24 Oct 2022 07:00  --------------------------------------------------------  IN: 1674 mL / OUT: 2050 mL / NET: -376 mL    24 Oct 2022 07:01  -  24 Oct 2022 20:25  --------------------------------------------------------  IN: 804 mL / OUT: 2850 mL / NET: -2046 mL        CAPILLARY BLOOD GLUCOSE    CAPILLARY BLOOD GLUCOSE      POCT Blood Glucose.: 129 mg/dL (24 Oct 2022 12:28)      PHYSICAL EXAM:  GENERAL: No acute distress, well-developed  HEAD:  Atraumatic, Normocephalic  EYES: EOMI, PERRLA, conjunctiva and sclera clear  NECK: Supple, no lymphadenopathy, no JVD  CHEST/LUNG: CTAB; No wheezes, rales, or rhonchi  HEART: Regular rate and rhythm. Normal S1/S2. No murmurs, rubs, or gallops  ABDOMEN: Soft, non-tender, non-distended; normal bowel sounds, no organomegaly  EXTREMITIES:  2+ peripheral pulses b/l, No clubbing, cyanosis, or edema  NEUROLOGY: A&O x 3, no focal deficits  SKIN: No rashes or lesions    ============================I/O===========================   I&O's Detail    23 Oct 2022 07:01  -  24 Oct 2022 07:00  --------------------------------------------------------  IN:    Heparin: 324 mL    Oral Fluid: 1350 mL  Total IN: 1674 mL    OUT:    Indwelling Catheter - Urethral (mL): 1900 mL    Voided (mL): 150 mL  Total OUT: 2050 mL    Total NET: -376 mL      24 Oct 2022 07:01  -  24 Oct 2022 20:25  --------------------------------------------------------  IN:    Bumetanide: 100 mL    Heparin: 114 mL    Oral Fluid: 590 mL  Total IN: 804 mL    OUT:    Indwelling Catheter - Urethral (mL): 2850 mL  Total OUT: 2850 mL    Total NET: -2046 mL        ============================ LABS =========================                        10.2   7.11  )-----------( 324      ( 24 Oct 2022 01:32 )             33.7     10-24    133<L>  |  89<L>  |  106<H>  ----------------------------<  152<H>  5.6<H>   |  32<H>  |  2.96<H>    Ca    9.4      24 Oct 2022 15:11  Phos  4.7     10-24  Mg     2.3     10-24    TPro  7.8  /  Alb  3.8  /  TBili  0.4  /  DBili  x   /  AST  25  /  ALT  19  /  AlkPhos  104  10-24    Troponin T, High Sensitivity Result: 61 ng/L (10-23-22 @ 00:25)              LIVER FUNCTIONS - ( 24 Oct 2022 15:11 )  Alb: 3.8 g/dL / Pro: 7.8 g/dL / ALK PHOS: 104 U/L / ALT: 19 U/L / AST: 25 U/L / GGT: x           PT/INR - ( 24 Oct 2022 01:33 )   PT: 13.9 sec;   INR: 1.20 ratio         PTT - ( 24 Oct 2022 15:11 )  PTT:53.5 sec    Lactate, Blood: 1.0 mmol/L (10-24-22 @ 02:24)  Blood Gas Venous - Lactate: 1.1 mmol/L (10-23-22 @ 00:13)  Blood Gas Venous - Lactate: 1.20 mmol/L (10-22-22 @ 20:26)  Lactate, Blood: 0.9 mmol/L (10-22-22 @ 19:55)      ======================Micro/Rad/Cardio=================  Telemtry: Reviewed   EKG: Reviewed  CXR: Reviewed  Culture: Reviewed   Echo: Transthoracic Echocardiogram:   Patient name: DAMARI GUERRERO  YOB: 1948   Age: 73 (F)   MR#: [id number]  Study Date: 9/21/2021  [facility]  Study quality: Technically Difficult  Referring Physician: Renny Newman MD  Blood Pressure: 158/88 mmHg  Height: 163 cm  Weight: 109 kg  BSA: 2.1 m2  ------------------------------------------------------------------------  PROCEDURE: Transthoracic echocardiogram with 2-D, M-Mode  and complete spectral and color flow Doppler.  INDICATION: Dyspnea, unspecified (R06.00)  ------------------------------------------------------------------------  OBSERVATIONS:  Mitral Valve: Mechanical prosthetic mitral valve  replacement. Minimal mitral regurgitation. Mean transmitral  valve gradient equals 5 mm Hg, which is probably normal in  the setting of a prosthetic valve.  Aortic Root: Normal aortic root.  Aortic Valve: Aortic valve leaflet morphology not well  visualized. Mild aortic regurgitation.  Left Atrium: Normal left atrium.  Left Ventricle: Endocardium not well visualized; grossly  normal left ventricular systolic function.  Right Heart: Normal right atrium. Unable to accurately  evaluate right ventricular size or systolic function.  Normal tricuspid valve.  Mild-moderate tricuspid  regurgitation. Normal pulmonic valve.  Pericardium/PleuraNormal pericardium with no pericardial  effusion.  ------------------------------------------------------------------------  CONCLUSIONS:  Technically difficult study.  1. Mechanical prosthetic mitral valve replacement. Minimal  mitral regurgitation. Mean transmitral valve gradient  equals 5 mm Hg, which is probably normal in the setting of  a prosthetic valve.  2. Aortic valve leaflet morphology not well visualized.  Mild aortic regurgitation.  3. Endocardium not well visualized; grossly normal left  ventricular systolic function.  4. Unable to accurately evaluate right ventricular size or  systolic function.  ------------------------------------------------------------------------  Confirmed on  9/22/2021 - 08:45:40 by Montana Azevedo M.D.,  [facility], ANGELA  ------------------------------------------------------------------------ (09-21-21 @ 19:50)  ======================================================  PAST MEDICAL & SURGICAL HISTORY:  Atrial fibrillation  S/P Ablation      Pulmonary hypertension      MIGUEL (obstructive sleep apnea)      COPD (chronic obstructive pulmonary disease)  on home O2      CHF (congestive heart failure)      HTN (hypertension)      Gout      Mitral valve replaced      Tricuspid valve replaced      CHF (congestive heart failure)      Hypertension      COPD (chronic obstructive pulmonary disease)      History of mitral valve replacement with mechanical valve      Tricuspid valve replaced  mechanical        ====================ASSESSMENT ==============  75 y/o F with pmhx of COPD on home O2(3L) with severe pHTN, MVR/TVR , AF s/p ablation,  OHS/MIGUEL on home biPAP, HTN, HLD who presents with one week of vomiting with poor oral intake also complaining of SOB and chest pain.  She also states she has been taking prednisone on/off for the past 3 months for chest tightness and does endorse some weight gain.     In the ED pt found to have A Flutter and given Amio 400 po and Dig loaded.     Plan:  ====================== NEUROLOGY=====================  - A and O x 4, maintain bedrest  - No complaints of pain  ==================== RESPIRATORY======================  - CXR likely fluid OL  - Continue proair inhalation  - c/w prednisone 5mg since on chronically for 3 months    Mechanical Ventilation:  Mode: AC/ CMV (Assist Control/ Continuous Mandatory Ventilation)  RR (machine): 16  TV (machine): 500  FiO2: 40  PEEP: 6  ITime: 1  MAP: 11  PIP: 20    ====================CARDIOVASCULAR==================  Hx of AF w RVR now w AFlutter  - Not on home AC  - obtain TTE, doesnt appear to be in cardiogenic shock but has pulm edema  - Continue home metoprolol and hydralazine  - responded UOP to metolazone & bumex  - switch to bumex drip 2     AFLutter  - Amio loaded  - holding digoxin   - EP following  - Continue Metoprolol    MVR/TV replacement off AC  - echo technically difficult, unable to assess due to habitus and positioning  - per chart review under last name Nancy MRN [id number] - mechanical valve replacement 1999, was previously on coumadin but stopped in 09/2021 after worsening anemia, concern for GI bleed & epistaxis.   - continue with heparin GTT  - may require ELIZABETH(?) if needs further assessment  ===================HEMATOLOGIC/ONC ===================  - heparin GTT  - monitor PTT and adjust as appropriate  ===================== RENAL =========================  - Cr uptrending, now 3.01  - baseline ~1.6  - monitor while diuresing  - renal consult if Cr continues to uptrend & UOP declines  ==================== GASTROINTESTINAL===================  - Continue DASH CC diet  =======================    ENDOCRINE  =====================  - A1c 10.7  - FS ~100-130  - Continue home Lantus and premeal, adjust as appropriate  ========================INFECTIOUS DISEASE================  - NO WBC or fevers, no cough or purulent sputum  - monitor off Abbx for now    Patient requires continuous monitoring with bedside rhythm monitoring, pulse ox monitoring, and intermittent blood gas analysis. Care plan discussed with ICU care team. Patient remained critical and at risk for life threatening decompensation.  Patient seen, examined and plan discussed with CCU team during rounds.     I have personally provided ____ minutes of critical care time excluding time spent on separate procedures, in addition to initial critical care time provided by the CICU Attending, Dr. Hair.    By signing my name below, I, Mildred Borrego, attest that this documentation has been prepared under the direction and in the presence of GERI [place].  Electronically signed: Kaylee Walker, 10-24-22 @ 20:25    I, Joanna Chacon, personally performed the services described in this documentation. all medical record entries made by the purviibe were at my direction and in my presence. I have reviewed the chart and agree that the record reflects my personal performance and is accurate and complete  Electronically signed: GERI [place].       venodynes

## 2023-12-13 NOTE — ASU DISCHARGE PLAN (ADULT/PEDIATRIC) - FOLLOW UP APPOINTMENTS
[facility]:  [facility]:  [facility]

## 2023-12-13 NOTE — ASU DISCHARGE PLAN (ADULT/PEDIATRIC) - NS MD DC FALL RISK RISK
For information on Fall & Injury Prevention, visit: [email or web address] OR  [email or web address] OR  https://www.cdc.gov/steadi/patient.html For information on Fall & Injury Prevention, visit: [email or web address]://www.[facility]/news/[email or web address]-[email or web address] OR  [email or web address]://[email or web address].[facility]/news/[email or web address]-tips-[email or web address]-avoid-injury OR  https://www.cdc.gov/steadi/patient.html

## 2023-12-19 LAB
SURGICAL PATHOLOGY STUDY: SIGNIFICANT CHANGE UP
SURGICAL PATHOLOGY STUDY: SIGNIFICANT CHANGE UP

## 2024-01-08 ENCOUNTER — APPOINTMENT (OUTPATIENT)
Dept: SURGERY | Facility: CLINIC | Age: 69
End: 2024-01-08
Payer: MEDICARE

## 2024-01-08 DIAGNOSIS — M62.08 SEPARATION OF MUSCLE (NONTRAUMATIC), OTHER SITE: ICD-10-CM

## 2024-01-08 DIAGNOSIS — E66.09 OTHER OBESITY DUE TO EXCESS CALORIES: ICD-10-CM

## 2024-01-08 DIAGNOSIS — K43.2 INCISIONAL HERNIA W/OUT OBSTRUCTION OR GANGRENE: ICD-10-CM

## 2024-01-08 DIAGNOSIS — I89.0 LYMPHEDEMA, NOT ELSEWHERE CLASSIFIED: ICD-10-CM

## 2024-01-08 PROCEDURE — 99213 OFFICE O/P EST LOW 20 MIN: CPT

## 2024-01-08 RX ORDER — TRAMADOL HYDROCHLORIDE 50 MG/1
50 TABLET, COATED ORAL
Qty: 20 | Refills: 0 | Status: COMPLETED | COMMUNITY
Start: 2023-12-13 | End: 2024-01-08

## 2024-01-08 NOTE — ASSESSMENT
[FreeTextEntry1] : MARIAH CHAMBERLAIN underwent a recurrent periumbilical ventral hernia repair with mesh with Dr. Noguera on 12/13/23 under local IV sedation without any problems or complications. Her wound is clean, dry and intact. There is no evidence of erythema, seroma formation or infection. She is tolerating a diet and having normal bowel movements. She denies any significant postoperative pain or discomfort at this time. She is adamant that she still has an umbilical hernia that was not repaired and is requesting a name of a surgeon who will repair her hernia. Upon extensive discussion, we determined that she is under the impression that her diastasis recti is in fact assumed to be a hernia and she was given materials to explain the etiology of diastasis hernia and treatment options.  She was also told by another physician that once she has an appropriate hernia repair done that she would have a flat stomach and lose 50 pounds immediately. This was clarified to Mariah and is absolutely not true.  Of note, she does have severe bilateral lower extremity lymphedema and I recommended an evaluation with a vascular surgeon, to which she agreed to.    She was counseled and reassured. MARIAH was discharged from the office with no specific follow up necessary, but she knows to avoid any heavy lifting or strenuous activity for the next several weeks.

## 2024-01-08 NOTE — CONSULT LETTER
[Dear  ___] : Dear  [unfilled], [Courtesy Letter:] : I had the pleasure of seeing your patient, [unfilled], in my office today. [Please see my note below.] : Please see my note below. [Consult Closing:] : Thank you very much for allowing me to participate in the care of this patient.  If you have any questions, please do not hesitate to contact me. [Sincerely,] : Sincerely, [FreeTextEntry3] :    Sally Mustafa PA-C, SASCHAAS

## 2024-01-08 NOTE — PHYSICAL EXAM
[JVD] : no jugular venous distention  [Respiratory Effort] : normal respiratory effort [Alert] : alert [Agitated] : agitated [de-identified] : overweight [de-identified] : normal [de-identified] :  Protuberant abdomen, moderate to large diastasis recti.   [de-identified] : Incision clean, dry, intact, no edema, no erythema, no drainage

## 2024-01-10 PROBLEM — F31.9 BIPOLAR DISORDER, UNSPECIFIED: Chronic | Status: ACTIVE | Noted: 2023-11-29

## 2024-01-10 PROBLEM — I89.0 LYMPHEDEMA, NOT ELSEWHERE CLASSIFIED: Chronic | Status: ACTIVE | Noted: 2023-11-29

## 2024-01-10 PROBLEM — J44.9 CHRONIC OBSTRUCTIVE PULMONARY DISEASE, UNSPECIFIED: Chronic | Status: ACTIVE | Noted: 2023-11-29

## 2024-01-10 PROBLEM — I50.9 HEART FAILURE, UNSPECIFIED: Chronic | Status: ACTIVE | Noted: 2023-11-29

## 2024-01-10 PROBLEM — J45.909 UNSPECIFIED ASTHMA, UNCOMPLICATED: Chronic | Status: ACTIVE | Noted: 2023-11-29

## 2024-02-14 ENCOUNTER — APPOINTMENT (OUTPATIENT)
Dept: VASCULAR SURGERY | Facility: CLINIC | Age: 69
End: 2024-02-14

## 2024-04-30 ENCOUNTER — INPATIENT (INPATIENT)
Facility: HOSPITAL | Age: 69
LOS: 12 days | Discharge: HOME HEALTH | DRG: 192 | End: 2024-05-13
Attending: INTERNAL MEDICINE | Admitting: STUDENT IN AN ORGANIZED HEALTH CARE EDUCATION/TRAINING PROGRAM
Payer: MEDICARE

## 2024-04-30 VITALS
HEART RATE: 75 BPM | DIASTOLIC BLOOD PRESSURE: 94 MMHG | SYSTOLIC BLOOD PRESSURE: 177 MMHG | TEMPERATURE: 98 F | RESPIRATION RATE: 18 BRPM | OXYGEN SATURATION: 95 %

## 2024-04-30 DIAGNOSIS — Z98.890 OTHER SPECIFIED POSTPROCEDURAL STATES: Chronic | ICD-10-CM

## 2024-04-30 DIAGNOSIS — J44.9 CHRONIC OBSTRUCTIVE PULMONARY DISEASE, UNSPECIFIED: ICD-10-CM

## 2024-04-30 DIAGNOSIS — Z98.891 HISTORY OF UTERINE SCAR FROM PREVIOUS SURGERY: Chronic | ICD-10-CM

## 2024-04-30 LAB
ALBUMIN SERPL ELPH-MCNC: 3.4 G/DL — LOW (ref 3.5–5.2)
ALP SERPL-CCNC: 143 U/L — HIGH (ref 30–115)
ALT FLD-CCNC: 10 U/L — SIGNIFICANT CHANGE UP (ref 0–41)
ANION GAP SERPL CALC-SCNC: 12 MMOL/L — SIGNIFICANT CHANGE UP (ref 7–14)
ANISOCYTOSIS BLD QL: SIGNIFICANT CHANGE UP
AST SERPL-CCNC: 19 U/L — SIGNIFICANT CHANGE UP (ref 0–41)
BASE EXCESS BLDV CALC-SCNC: 6.6 MMOL/L — HIGH (ref -2–3)
BASOPHILS # BLD AUTO: 0.16 K/UL — SIGNIFICANT CHANGE UP (ref 0–0.2)
BASOPHILS NFR BLD AUTO: 1.8 % — HIGH (ref 0–1)
BILIRUB SERPL-MCNC: <0.2 MG/DL — SIGNIFICANT CHANGE UP (ref 0.2–1.2)
BUN SERPL-MCNC: 5 MG/DL — LOW (ref 10–20)
CA-I SERPL-SCNC: 1.23 MMOL/L — SIGNIFICANT CHANGE UP (ref 1.15–1.33)
CALCIUM SERPL-MCNC: 9 MG/DL — SIGNIFICANT CHANGE UP (ref 8.4–10.4)
CHLORIDE SERPL-SCNC: 95 MMOL/L — LOW (ref 98–110)
CO2 SERPL-SCNC: 27 MMOL/L — SIGNIFICANT CHANGE UP (ref 17–32)
CREAT SERPL-MCNC: <0.5 MG/DL — LOW (ref 0.7–1.5)
DACRYOCYTES BLD QL SMEAR: SLIGHT — SIGNIFICANT CHANGE UP
EGFR: 108 ML/MIN/1.73M2 — SIGNIFICANT CHANGE UP
EOSINOPHIL # BLD AUTO: 0 K/UL — SIGNIFICANT CHANGE UP (ref 0–0.7)
EOSINOPHIL NFR BLD AUTO: 0 % — SIGNIFICANT CHANGE UP (ref 0–8)
GAS PNL BLDV: 129 MMOL/L — LOW (ref 136–145)
GAS PNL BLDV: SIGNIFICANT CHANGE UP
GAS PNL BLDV: SIGNIFICANT CHANGE UP
GIANT PLATELETS BLD QL SMEAR: PRESENT — SIGNIFICANT CHANGE UP
GLUCOSE SERPL-MCNC: 92 MG/DL — SIGNIFICANT CHANGE UP (ref 70–99)
HCO3 BLDV-SCNC: 35 MMOL/L — HIGH (ref 22–29)
HCT VFR BLD CALC: 26.8 % — LOW (ref 37–47)
HCT VFR BLDA CALC: 33 % — LOW (ref 34.5–46.5)
HGB BLD CALC-MCNC: 10.9 G/DL — LOW (ref 11.7–16.1)
HGB BLD-MCNC: 7.6 G/DL — LOW (ref 12–16)
HYPOCHROMIA BLD QL: SIGNIFICANT CHANGE UP
LACTATE BLDV-MCNC: 1 MMOL/L — SIGNIFICANT CHANGE UP (ref 0.5–2)
LYMPHOCYTES # BLD AUTO: 1.05 K/UL — LOW (ref 1.2–3.4)
LYMPHOCYTES # BLD AUTO: 11.7 % — LOW (ref 20.5–51.1)
MAGNESIUM SERPL-MCNC: 1.9 MG/DL — SIGNIFICANT CHANGE UP (ref 1.8–2.4)
MANUAL SMEAR VERIFICATION: SIGNIFICANT CHANGE UP
MCHC RBC-ENTMCNC: 20.1 PG — LOW (ref 27–31)
MCHC RBC-ENTMCNC: 28.4 G/DL — LOW (ref 32–37)
MCV RBC AUTO: 70.9 FL — LOW (ref 81–99)
METAMYELOCYTES # FLD: 0.9 % — HIGH (ref 0–0)
MICROCYTES BLD QL: SIGNIFICANT CHANGE UP
MONOCYTES # BLD AUTO: 0.08 K/UL — LOW (ref 0.1–0.6)
MONOCYTES NFR BLD AUTO: 0.9 % — LOW (ref 1.7–9.3)
NEUTROPHILS # BLD AUTO: 6.63 K/UL — HIGH (ref 1.4–6.5)
NEUTROPHILS NFR BLD AUTO: 73.9 % — SIGNIFICANT CHANGE UP (ref 42.2–75.2)
NT-PROBNP SERPL-SCNC: 383 PG/ML — HIGH (ref 0–300)
OVALOCYTES BLD QL SMEAR: SIGNIFICANT CHANGE UP
PCO2 BLDV: 69 MMHG — HIGH (ref 39–42)
PH BLDV: 7.31 — LOW (ref 7.32–7.43)
PLAT MORPH BLD: ABNORMAL
PLATELET # BLD AUTO: 490 K/UL — HIGH (ref 130–400)
PMV BLD: 8.4 FL — SIGNIFICANT CHANGE UP (ref 7.4–10.4)
PO2 BLDV: 27 MMHG — SIGNIFICANT CHANGE UP (ref 25–45)
POIKILOCYTOSIS BLD QL AUTO: SLIGHT — SIGNIFICANT CHANGE UP
POLYCHROMASIA BLD QL SMEAR: SIGNIFICANT CHANGE UP
POTASSIUM BLDV-SCNC: 3.9 MMOL/L — SIGNIFICANT CHANGE UP (ref 3.5–5.1)
POTASSIUM SERPL-MCNC: 4.5 MMOL/L — SIGNIFICANT CHANGE UP (ref 3.5–5)
POTASSIUM SERPL-SCNC: 4.5 MMOL/L — SIGNIFICANT CHANGE UP (ref 3.5–5)
PROMYELOCYTES # FLD: 8.1 % — HIGH (ref 0–0)
PROT SERPL-MCNC: 6 G/DL — SIGNIFICANT CHANGE UP (ref 6–8)
RBC # BLD: 3.78 M/UL — LOW (ref 4.2–5.4)
RBC # FLD: 21.2 % — HIGH (ref 11.5–14.5)
RBC BLD AUTO: ABNORMAL
SAO2 % BLDV: 39.9 % — LOW (ref 67–88)
SMUDGE CELLS # BLD: PRESENT — SIGNIFICANT CHANGE UP
SODIUM SERPL-SCNC: 134 MMOL/L — LOW (ref 135–146)
TROPONIN T, HIGH SENSITIVITY RESULT: 10 NG/L — SIGNIFICANT CHANGE UP (ref 6–13)
VARIANT LYMPHS # BLD: 2.7 % — SIGNIFICANT CHANGE UP (ref 0–5)
WBC # BLD: 8.97 K/UL — SIGNIFICANT CHANGE UP (ref 4.8–10.8)
WBC # FLD AUTO: 8.97 K/UL — SIGNIFICANT CHANGE UP (ref 4.8–10.8)

## 2024-04-30 PROCEDURE — 97116 GAIT TRAINING THERAPY: CPT | Mod: GP

## 2024-04-30 PROCEDURE — 86901 BLOOD TYPING SEROLOGIC RH(D): CPT

## 2024-04-30 PROCEDURE — 84484 ASSAY OF TROPONIN QUANT: CPT

## 2024-04-30 PROCEDURE — 86140 C-REACTIVE PROTEIN: CPT

## 2024-04-30 PROCEDURE — 97530 THERAPEUTIC ACTIVITIES: CPT | Mod: GP

## 2024-04-30 PROCEDURE — 86900 BLOOD TYPING SEROLOGIC ABO: CPT

## 2024-04-30 PROCEDURE — 83735 ASSAY OF MAGNESIUM: CPT

## 2024-04-30 PROCEDURE — 85730 THROMBOPLASTIN TIME PARTIAL: CPT

## 2024-04-30 PROCEDURE — 85027 COMPLETE CBC AUTOMATED: CPT

## 2024-04-30 PROCEDURE — 0225U NFCT DS DNA&RNA 21 SARSCOV2: CPT

## 2024-04-30 PROCEDURE — 83550 IRON BINDING TEST: CPT

## 2024-04-30 PROCEDURE — 97162 PT EVAL MOD COMPLEX 30 MIN: CPT | Mod: GP

## 2024-04-30 PROCEDURE — 93306 TTE W/DOPPLER COMPLETE: CPT

## 2024-04-30 PROCEDURE — C9113: CPT

## 2024-04-30 PROCEDURE — 83540 ASSAY OF IRON: CPT

## 2024-04-30 PROCEDURE — 94640 AIRWAY INHALATION TREATMENT: CPT

## 2024-04-30 PROCEDURE — 80053 COMPREHEN METABOLIC PANEL: CPT

## 2024-04-30 PROCEDURE — 36415 COLL VENOUS BLD VENIPUNCTURE: CPT

## 2024-04-30 PROCEDURE — 71045 X-RAY EXAM CHEST 1 VIEW: CPT

## 2024-04-30 PROCEDURE — 82728 ASSAY OF FERRITIN: CPT

## 2024-04-30 PROCEDURE — 71045 X-RAY EXAM CHEST 1 VIEW: CPT | Mod: 26

## 2024-04-30 PROCEDURE — 82977 ASSAY OF GGT: CPT

## 2024-04-30 PROCEDURE — 93970 EXTREMITY STUDY: CPT

## 2024-04-30 PROCEDURE — 99285 EMERGENCY DEPT VISIT HI MDM: CPT

## 2024-04-30 PROCEDURE — 88305 TISSUE EXAM BY PATHOLOGIST: CPT

## 2024-04-30 PROCEDURE — 74177 CT ABD & PELVIS W/CONTRAST: CPT | Mod: MC

## 2024-04-30 PROCEDURE — 85610 PROTHROMBIN TIME: CPT

## 2024-04-30 PROCEDURE — 94660 CPAP INITIATION&MGMT: CPT

## 2024-04-30 PROCEDURE — 86850 RBC ANTIBODY SCREEN: CPT

## 2024-04-30 PROCEDURE — 88312 SPECIAL STAINS GROUP 1: CPT

## 2024-04-30 PROCEDURE — 84080 ASSAY ALKALINE PHOSPHATASES: CPT

## 2024-04-30 PROCEDURE — 85025 COMPLETE CBC W/AUTO DIFF WBC: CPT

## 2024-04-30 RX ORDER — AZTREONAM 2 G
2000 VIAL (EA) INJECTION ONCE
Refills: 0 | Status: COMPLETED | OUTPATIENT
Start: 2024-04-30 | End: 2024-04-30

## 2024-04-30 RX ORDER — IPRATROPIUM/ALBUTEROL SULFATE 18-103MCG
3 AEROSOL WITH ADAPTER (GRAM) INHALATION ONCE
Refills: 0 | Status: COMPLETED | OUTPATIENT
Start: 2024-04-30 | End: 2024-04-30

## 2024-04-30 RX ORDER — METRONIDAZOLE 500 MG
500 TABLET ORAL ONCE
Refills: 0 | Status: COMPLETED | OUTPATIENT
Start: 2024-04-30 | End: 2024-04-30

## 2024-04-30 RX ADMIN — Medication 3 MILLILITER(S): at 21:42

## 2024-04-30 RX ADMIN — Medication 125 MILLIGRAM(S): at 19:58

## 2024-04-30 RX ADMIN — Medication 100 MILLIGRAM(S): at 19:55

## 2024-04-30 NOTE — ED PROVIDER NOTE - CLINICAL SUMMARY MEDICAL DECISION MAKING FREE TEXT BOX
68-year-old female with leg cellulitis superimposed on chronic lymphedema.  Patient is uncooperative and abusive.  Ultimately allowed labs to be drawn.  Dose of antibiotics was ordered, admitted for further management.

## 2024-04-30 NOTE — ED ADULT TRIAGE NOTE - CHIEF COMPLAINT QUOTE
pt BIBA from assisted living c/o B/L LE cellulitis and SOB since this afternoon. pt hx of COPD on 3LNC Prn. as per EMS neb treatment x1. pt placed on 4LNC in triage.

## 2024-04-30 NOTE — ED ADULT NURSE REASSESSMENT NOTE - NS ED NURSE REASSESS COMMENT FT1
pt refusing nasal cannula at this time - pt states her oxygen is 100% and she doesn't need her nasal cannula. when attempting to put nasal cannula on pt becomes verbally & physically abusive with staff. MD Hampton & CELY Blanco aware at this time

## 2024-04-30 NOTE — ED PROVIDER NOTE - NSTIMEPROVIDERCAREINITIATE_GEN_ER
30-Apr-2024 17:00 Dr Campos   please confirm /order the IR aspiration of suboccipital abcess   as per ortho they recommend aspiration   pt with OM and suboccipital acess with pain , HA and dec ROM

## 2024-04-30 NOTE — ED PROVIDER NOTE - PROGRESS NOTE DETAILS
patient agitated, combative. thoroughly discussed with patient importance and benefit of bipap, patient adamantly refusing bipap @ this time. EP: Patient is disrespectful and threatening with the events, does not want to answer questions pertaining to the reason for her visit here, medical complaints and the medical issues.  She is speaking full sentences, agitated and speaking loud voice. Says she lives alone, but that appears unlikely, will call facility for collateral information.

## 2024-04-30 NOTE — ED ADULT NURSE REASSESSMENT NOTE - NS ED NURSE REASSESS COMMENT FT1
pt refusing oxygen by mask @ this time CELY Blanco & MD Hampton aware at this time & at bedside - as per pt "she doesn't need an oxygen mask she needs to eat right now".

## 2024-04-30 NOTE — ED PROVIDER NOTE - ATTENDING APP SHARED VISIT CONTRIBUTION OF CARE
68-year-old female past medical history of COPD on 3 L via nasal cannula, bipolar disorder, CHF brought from an outside facility for "I need my legs drained". She is sleeping, but easily arousable, refuses to answer questions, refuses exam,, refuses labs.  Chronically ill-appearing female resting in stretcher, does not appear to be in any acute distress, speaking full sentences, speaking in a loud voice without any dyspnea or tachypnea, there is bilateral lower leg chronic lymphedema with left leg being larger in size, there is bilateral lower leg erythema and warmth. Plan: Labs, will obtain collateral information from the facility the patient was sent from.

## 2024-04-30 NOTE — ED PROVIDER NOTE - OBJECTIVE STATEMENT
68 year old female, past medical history htn, hdl, bipolar disorder, copd on 2L, chf, chronic lymphedema, asthma, who presents with shortness of breath and le redness. patient noticed b/l le redness that worsened x1 week. patient also reports shortness of breath xseveral days. denies f/c, chest pain, hemoptysis, vomiting, dark/bloody stool. no ac use.

## 2024-04-30 NOTE — ED ADULT NURSE NOTE - OBJECTIVE STATEMENT
pt BIBA from assisted living c/o B/L LE cellulitis and SOB since this afternoon. as per EMS neb treatment x1. pt placed on 4LNC in triage.

## 2024-04-30 NOTE — ED PROVIDER NOTE - PHYSICAL EXAMINATION
CONSTITUTIONAL: non-toxic appearing elderly female, no acute distress  SKIN: skin exam is warm and dry  HEAD: Normocephalic; atraumatic  EYES: EOM intact; conjunctiva and sclera clear  ENT: MMM   CARD: S1, S2 normal, no murmur  RESP: decreased breath sounds bilaterally, +scant wheezing, no increased WOB.   ABD: soft; non-distended; non-tender  EXT: Normal ROM, chronic lymphedema with +erythematous/swelling to b/l le.   NEURO: awake, alert, following commands, oriented, grossly unremarkable. No Focal deficits. GCS 15.   PSYCH: Cooperative, appropriate.

## 2024-04-30 NOTE — ED ADULT NURSE NOTE - NSFALLHARMRISKINTERV_ED_ALL_ED
Assistance OOB with selected safe patient handling equipment if applicable/Assistance with ambulation/Communicate risk of Fall with Harm to all staff, patient, and family/Monitor gait and stability/Provide visual cue: red socks, yellow wristband, yellow gown, etc/Reinforce activity limits and safety measures with patient and family/Use of alarms - bed, stretcher, chair and/or video monitoring/Bed in lowest position, wheels locked, appropriate side rails in place/Call bell, personal items and telephone in reach/Instruct patient to call for assistance before getting out of bed/chair/stretcher/Non-slip footwear applied when patient is off stretcher/[place] to call system/Physically safe environment - no spills, clutter or unnecessary equipment/Purposeful Proactive Rounding/Room/bathroom lighting operational, light cord in reach

## 2024-05-01 LAB
BLD GP AB SCN SERPL QL: SIGNIFICANT CHANGE UP
HCT VFR BLD CALC: 27.6 % — LOW (ref 37–47)
HGB BLD-MCNC: 7.8 G/DL — LOW (ref 12–16)
IRON SATN MFR SERPL: 19 UG/DL — LOW (ref 35–150)
IRON SATN MFR SERPL: 6 % — LOW (ref 15–50)
MCHC RBC-ENTMCNC: 19.9 PG — LOW (ref 27–31)
MCHC RBC-ENTMCNC: 28.3 G/DL — LOW (ref 32–37)
MCV RBC AUTO: 70.6 FL — LOW (ref 81–99)
NRBC # BLD: 0 /100 WBCS — SIGNIFICANT CHANGE UP (ref 0–0)
PLATELET # BLD AUTO: 499 K/UL — HIGH (ref 130–400)
PMV BLD: 8.7 FL — SIGNIFICANT CHANGE UP (ref 7.4–10.4)
RBC # BLD: 3.91 M/UL — LOW (ref 4.2–5.4)
RBC # FLD: 21.2 % — HIGH (ref 11.5–14.5)
TIBC SERPL-MCNC: 332 UG/DL — SIGNIFICANT CHANGE UP (ref 220–430)
TROPONIN T, HIGH SENSITIVITY RESULT: 13 NG/L — SIGNIFICANT CHANGE UP (ref 6–13)
UIBC SERPL-MCNC: 313 UG/DL — SIGNIFICANT CHANGE UP (ref 110–370)
WBC # BLD: 7.27 K/UL — SIGNIFICANT CHANGE UP (ref 4.8–10.8)
WBC # FLD AUTO: 7.27 K/UL — SIGNIFICANT CHANGE UP (ref 4.8–10.8)

## 2024-05-01 PROCEDURE — 93970 EXTREMITY STUDY: CPT | Mod: 26

## 2024-05-01 PROCEDURE — 99223 1ST HOSP IP/OBS HIGH 75: CPT

## 2024-05-01 RX ORDER — FERROUS SULFATE 325(65) MG
325 TABLET ORAL DAILY
Refills: 0 | Status: DISCONTINUED | OUTPATIENT
Start: 2024-05-02 | End: 2024-05-02

## 2024-05-01 RX ORDER — ACETAMINOPHEN 500 MG
650 TABLET ORAL EVERY 6 HOURS
Refills: 0 | Status: DISCONTINUED | OUTPATIENT
Start: 2024-05-01 | End: 2024-05-13

## 2024-05-01 RX ORDER — PANTOPRAZOLE SODIUM 20 MG/1
40 TABLET, DELAYED RELEASE ORAL
Refills: 0 | Status: DISCONTINUED | OUTPATIENT
Start: 2024-05-01 | End: 2024-05-04

## 2024-05-01 RX ORDER — TRIHEXYPHENIDYL HCL 2 MG
2 TABLET ORAL
Refills: 0 | Status: DISCONTINUED | OUTPATIENT
Start: 2024-05-01 | End: 2024-05-13

## 2024-05-01 RX ORDER — ARIPIPRAZOLE 15 MG/1
5 TABLET ORAL AT BEDTIME
Refills: 0 | Status: DISCONTINUED | OUTPATIENT
Start: 2024-05-01 | End: 2024-05-13

## 2024-05-01 RX ORDER — LORATADINE 10 MG/1
10 TABLET ORAL DAILY
Refills: 0 | Status: DISCONTINUED | OUTPATIENT
Start: 2024-05-01 | End: 2024-05-13

## 2024-05-01 RX ORDER — ALBUTEROL 90 UG/1
2 AEROSOL, METERED ORAL EVERY 6 HOURS
Refills: 0 | Status: DISCONTINUED | OUTPATIENT
Start: 2024-05-01 | End: 2024-05-13

## 2024-05-01 RX ADMIN — Medication 100 MILLIGRAM(S): at 01:39

## 2024-05-01 RX ADMIN — LORATADINE 10 MILLIGRAM(S): 10 TABLET ORAL at 13:47

## 2024-05-01 RX ADMIN — PANTOPRAZOLE SODIUM 40 MILLIGRAM(S): 20 TABLET, DELAYED RELEASE ORAL at 18:12

## 2024-05-01 RX ADMIN — Medication 100 MILLIGRAM(S): at 18:13

## 2024-05-01 RX ADMIN — ARIPIPRAZOLE 5 MILLIGRAM(S): 15 TABLET ORAL at 22:16

## 2024-05-01 RX ADMIN — Medication 650 MILLIGRAM(S): at 18:13

## 2024-05-01 RX ADMIN — Medication 2 MILLIGRAM(S): at 18:13

## 2024-05-01 NOTE — ED ADULT NURSE REASSESSMENT NOTE - NS ED NURSE REASSESS COMMENT FT1
received handoff from RN. pt AXOx3, RR even and unlabored, satting 99 on RA. IV intact, no distress noted at this time.

## 2024-05-01 NOTE — H&P ADULT - ATTENDING COMMENTS
68 years old female with PMH of  htn, hld, bipolar disorder, copd on 2L home O2, chf, chronic lymphedema, asthma presents to ED with c/o shortness of breath and le redness and swelling.      1. Shortness of breath secondary to   * on 2L home O2, but now refusing to use  oxygen at hospital (satting 92%)  - VBG:  pH: 7.31, PCO2: 69, HCO3: 35    - Admit to medicine                 - ECG :                    - CXR:   - Cont solumedrol   - Cont nebs   - Procalcitonin:               - MRSA:     - f/u procal, mrsa swab, blood culture  - chest xray looks normal (pending official read)  - s/p aztreonem, flagyl, solu medrol and nebs in ED  - b/l LE edema and redness, non tender on exam  - f/u inflammatory markers    2. Lower extremity cellulitis     Unable to obtain further hx as pt is non-coperative, I tried calling the contact numbers in chart few times, no answer. Please obtain hx from pt in Am and also medication list (not available in the chart) 68 years old female with PMH of  htn, hld, bipolar disorder, copd on 2L home O2, chf, chronic lymphedema, asthma presents to ED with c/o shortness of breath and le redness and swelling.      1. Shortness of breath secondary to Acute CHF (orthopnea and no wheezing) tirggered by new worsening microcytic anemia . hx of COPD   * on 2L home O2, but now refusing to use  oxygen at hospital (satting 92%)  - VBG:  pH: 7.31, PCO2: 69, HCO3: 35. might be obesity hypoventilation   - Admit to medicine                 - ECG :                    - CXR:  Interstitial infiltrate. BNP not that elevated but pt is obese            -LUNA: negative   - Start lasix 40mg IV daily   - PPI IV bid   - Trop check     *  s/p aztreonem, flagyl, solu medrol and nebs in ED  - b/l LE edema and redness, non tender on exam    2. Lower extremity cellulitis Left > Right   - CRP:pending  - Start Rocephin and doxycycline   - Leonidas the erythema area  - Keep left leg elevated 68 years old female with PMH of  htn, hld, bipolar disorder, copd on 2L home O2, chf, chronic lipedema, asthma presents to ED with c/o shortness of breath and le redness and swelling.      1. Shortness of breath secondary to Acute CHF (orthopnea and no wheezing) tirggered by new worsening microcytic anemia . hx of COPD   * on 2L home O2, but now refusing to use  oxygen at hospital (satting 92%)  - VBG:  pH: 7.31, PCO2: 69, HCO3: 35. might be obesity hypoventilation   - Admit to medicine                 - ECG :TWI V1, V2                     - CXR:  Interstitial infiltrate. BNP not that elevated but pt is obese            -LUNA: negative   - Start lasix 40mg IV daily   - PPI IV bid   - Trop check   *  s/p aztreonem, flagyl, solumedrol and nebs in ED    2. Lower extremity cellulitis Left > Right   * Pt with baseline lipedema cuff sign  noted   - CRP:pending  - Start Rocephin and doxycycline   - Leonidas the erythema area  - Keep left leg elevated    3.Hypercapnea obesity hypoventilation syndrome   - Need pulm follow up as outpatient     4. DVT/GI px 68 years old female with PMH of  htn, hld, bipolar disorder, copd on 2L home O2, chf, chronic lipedema, asthma presents to ED with c/o shortness of breath and le redness and swelling.      1. Shortness of breath secondary to Acute CHF (orthopnea and no wheezing) tirggered by new worsening microcytic anemia . hx of COPD   * on 2L home O2, but now refusing to use  oxygen at hospital (satting 92%)  - VBG:  pH: 7.31, PCO2: 69, HCO3: 35. might be obesity hypoventilation   - Admit to medicine                 - ECG :TWI V1, V2                     - CXR:  Interstitial infiltrate. BNP not that elevated but pt is obese            -LUNA: negative   - Start lasix 40mg IV daily   - PPI IV bid   - Trop check   *  s/p aztreonem, flagyl, solumedrol and nebs in ED    2.Possible  Lower extremity cellulitis Left > Right in a pt with lipedema  * Pt with baseline lipedema cuff sign  noted   - CRP:pending  - Start  doxycycline   - Leonidas the erythema area  - Keep left leg elevated    3.Hypercapnea obesity hypoventilation syndrome   - Need pulm follow up as outpatient     4. DVT/GI px

## 2024-05-01 NOTE — H&P ADULT - HISTORY OF PRESENT ILLNESS
68 years old female with PMH of  htn, hdl, bipolar disorder, copd on 2L home O2, chf, chronic lymphedema, asthma presents to ED with c/o shortness of breath and le redness and swelling. Patient noticed b/l le redness that worsened x1 week. Patient also reports shortness of breath from  several days. Pt denies f/c, chest pain, hemoptysis, vomiting, dark/bloody stool. no ac use.  Patient refused to provide with further details saying she wants to sleep, only allowed me to do brief physical exam, she is not wheezing on auscultation, has b/l lower extremity edema with redness (concerning for venous stasis).    In ED   Vital Signs Last 24 Hrs)  T(F): 98  HR: 76   BP: 134/63   RR: 18   SpO2: 92%  on RA    Labs significant for Hb: 7.6 (15.9 in 11/2023),      68 years old female with PMH of  htn, hdl, bipolar disorder, copd on 2L home O2, chf, chronic lymphedema, asthma presents to ED with c/o shortness of breath and le redness and swelling. Patient noticed b/l le redness that worsened x1 week. Patient also reports shortness of breath from  several days. Pt denies f/c, chest pain, hemoptysis, vomiting, dark/bloody stool. no ac use.  Patient refused to provide with further details saying she wants to sleep, only allowed me to do brief physical exam, she is not wheezing on auscultation, has b/l lower extremity edema with redness (concerning for venous stasis).    In ED   Vital Signs Last 24 Hrs)  T(F): 98  HR: 76   BP: 134/63   RR: 18   SpO2: 92%  on RA    Labs significant for Hb: 7.6 (15.9 in 11/2023),   On Exam: chest b/l clear not wheezing  b/l lower extremity edema with redness   Patient received antibiotics (aztreonem and flagyl), nebs, solu mederol in ED    Patient being admitted to medicine for sob and questionable cellulitis.  68 years old female with PMH of  htn, hdl, bipolar disorder, copd on 2L home O2, chf, chronic lymphedema, asthma presents to ED with c/o shortness of breath and le redness and swelling. Patient noticed b/l le redness that worsened x1 week. Patient also reports shortness of breath from  several days. Pt denies f/c, chest pain, hemoptysis, vomiting, dark/bloody stool. no ac use.  Patient refused to provide with further details saying she wants to sleep, only allowed me to do brief physical exam, she is not wheezing on auscultation, has b/l lower extremity edema with redness (concerning for venous stasis).    In ED   Vital Signs Last 24 Hrs)  T(F): 98  HR: 76   BP: 134/63   RR: 18   SpO2: 92%  on RA    Labs significant for Hb: 7.6 (15.9 in 11/2023),   VBG:  pH: 7.31, PCO2: 69, HCO3: 35  On Exam: chest b/l clear not wheezing  b/l lower extremity edema with redness   Patient received antibiotics (aztreonem and flagyl), nebs, solu mederol in ED    Patient being admitted to medicine for sob and questionable cellulitis.

## 2024-05-01 NOTE — ED ADULT NURSE REASSESSMENT NOTE - NS ED NURSE REASSESS COMMENT FT1
Pt. received from previous RN. Pt. lying on stretcher, breathing with ease on RA. A&O x4. 18g noted to the R AC; IV intact, no redness/swelling at site. Awaiting clean bed assignment.

## 2024-05-01 NOTE — H&P ADULT - NSHPPHYSICALEXAM_GEN_ALL_CORE
PHYSICAL EXAM:  GENERAL: NAD, well-groomed, well-developed  HEAD:  Atraumatic, Normocephalic  EYES: EOMI, PERRLA, conjunctiva and sclera clear  ENMT: No tonsillar erythema, exudates, or enlargement; Moist mucous membranes  NECK: Supple, No JVD, Normal thyroid  HEART: Regular rate and rhythm; No murmurs, rubs, or gallops  RESPIRATORY: CTA B/L, No W/R/R  ABDOMEN: Soft, Nontender, Nondistended; Bowel sounds present  NEUROLOGY: A&Ox3, nonfocal, moving all extremities  EXTREMITIES:  3+ Peripheral edema, with redness around ankle  SKIN: warm, dry, normal color, no rash or abnormal lesions

## 2024-05-01 NOTE — H&P ADULT - NSHPLABSRESULTS_GEN_ALL_CORE
LABS:                         7.6    8.97  )-----------( 490      ( 30 Apr 2024 19:00 )             26.8     04-30    134<L>  |  95<L>  |  5<L>  ----------------------------<  92  4.5   |  27  |  <0.5<L>    Ca    9.0      30 Apr 2024 19:00  Mg     1.9     04-30    TPro  6.0  /  Alb  3.4<L>  /  TBili  <0.2  /  DBili  x   /  AST  19  /  ALT  10  /  AlkPhos  143<H>  04-30      Urinalysis Basic - ( 30 Apr 2024 19:00 )    Color: x / Appearance: x / SG: x / pH: x  Gluc: 92 mg/dL / Ketone: x  / Bili: x / Urobili: x   Blood: x / Protein: x / Nitrite: x   Leuk Esterase: x / RBC: x / WBC x   Sq Epi: x / Non Sq Epi: x / Bacteria: x        RADIOLOGY, EKG & ADDITIONAL TESTS: Reviewed.

## 2024-05-01 NOTE — H&P ADULT - ASSESSMENT
68 years old female with PMH of  htn, hdl, bipolar disorder, copd on 2L home O2, chf, chronic lymphedema, asthma presents to ED with c/o shortness of breath and le redness and swelling.      #Shortness of breath  #?COPD exacerbation  - on 2L home O2, but now refusing to use  68 years old female with PMH of  htn, hdl, bipolar disorder, copd on 2L home O2, chf, chronic lymphedema, asthma presents to ED with c/o shortness of breath and le redness and swelling.      #Shortness of breath  #?COPD exacerbation  - on 2L home O2, but now refusing to use  oxygen at hospital (satting 92%)  - not wheezing on exam, no distress  - sepsis ruled out  - f/u procal, mrsa swab, blood culture  - chest xray looks normal (pending official read)   68 years old female with PMH of  htn, hdl, bipolar disorder, copd on 2L home O2, chf, chronic lymphedema, asthma presents to ED with c/o shortness of breath and le redness and swelling.      #Shortness of breath  #?COPD exacerbation  #Cellulitis LE  - on 2L home O2, but now refusing to use  oxygen at hospital (satting 92%)  - not wheezing on exam, no distress  - VBG:  pH: 7.31, PCO2: 69, HCO3: 35  - sepsis ruled out  - f/u procal, mrsa swab, blood culture  - chest xray looks normal (pending official read)  - s/p aztreonem, flagyl, solu medrol and nebs in ED  - b/l LE edema and redness, non tender on exam  - f/u inflammatory markers     68 years old female with PMH of  htn, hld, bipolar disorder, copd on 2L home O2, chf, chronic lymphedema, asthma presents to ED with c/o shortness of breath and le redness and swelling.      #Shortness of breath  #?COPD exacerbation  #Cellulitis LE  - on 2L home O2, but now refusing to use  oxygen at hospital (satting 92%)  - not wheezing on exam, no distress  - VBG:  pH: 7.31, PCO2: 69, HCO3: 35  - sepsis ruled out  - f/u procal, mrsa swab, blood culture  - chest xray looks normal (pending official read)  - s/p aztreonem, flagyl, solu medrol and nebs in ED  - b/l LE edema and redness, non tender on exam  - f/u inflammatory markers    Unable to obtain further hx as pt is non-coperative, I tried calling the contact numbers in chart few times, no answer. Please obtain hx from pt in Am and also medication list (not available in the chart) 68 years old female with PMH of  htn, hld, bipolar disorder, copd on 2L home O2, chf, chronic lymphedema, asthma presents to ED with c/o shortness of breath and le redness and swelling.      #Shortness of breath  #?COPD exacerbation  #Cellulitis LE  - on 2L home O2, but now refusing to use  oxygen at hospital (satting 92%)  - not wheezing on exam, no distress  - VBG:  pH: 7.31, PCO2: 69, HCO3: 35  - sepsis ruled out  - f/u procal, mrsa swab, blood culture  - chest xray looks normal (pending official read)  - s/p aztreonem, flagyl, solu medrol and nebs in ED  - b/l LE edema and redness, non tender on exam  - f/u inflammatory markers    #Acute onset of anemia  - hb 7.6, last hb 15 in 2023 November  - anemia w/u sent  - might need GI eval please obtain hx for any acute blood loss)      Unable to obtain further hx as pt is non-coperative, I tried calling the contact numbers in chart few times, no answer. Please obtain hx from pt in Am and also medication list (not available in the chart)

## 2024-05-02 LAB
ALBUMIN SERPL ELPH-MCNC: 3.4 G/DL — LOW (ref 3.5–5.2)
ALP SERPL-CCNC: 131 U/L — HIGH (ref 30–115)
ALT FLD-CCNC: 8 U/L — SIGNIFICANT CHANGE UP (ref 0–41)
ANION GAP SERPL CALC-SCNC: 7 MMOL/L — SIGNIFICANT CHANGE UP (ref 7–14)
AST SERPL-CCNC: 10 U/L — SIGNIFICANT CHANGE UP (ref 0–41)
BASOPHILS # BLD AUTO: 0.05 K/UL — SIGNIFICANT CHANGE UP (ref 0–0.2)
BASOPHILS NFR BLD AUTO: 0.5 % — SIGNIFICANT CHANGE UP (ref 0–1)
BILIRUB SERPL-MCNC: <0.2 MG/DL — SIGNIFICANT CHANGE UP (ref 0.2–1.2)
BUN SERPL-MCNC: 13 MG/DL — SIGNIFICANT CHANGE UP (ref 10–20)
CALCIUM SERPL-MCNC: 9.1 MG/DL — SIGNIFICANT CHANGE UP (ref 8.4–10.5)
CHLORIDE SERPL-SCNC: 96 MMOL/L — LOW (ref 98–110)
CO2 SERPL-SCNC: 31 MMOL/L — SIGNIFICANT CHANGE UP (ref 17–32)
CREAT SERPL-MCNC: 0.5 MG/DL — LOW (ref 0.7–1.5)
CRP SERPL-MCNC: 29 MG/L — HIGH
EGFR: 102 ML/MIN/1.73M2 — SIGNIFICANT CHANGE UP
EOSINOPHIL # BLD AUTO: 0.11 K/UL — SIGNIFICANT CHANGE UP (ref 0–0.7)
EOSINOPHIL NFR BLD AUTO: 1.2 % — SIGNIFICANT CHANGE UP (ref 0–8)
FERRITIN SERPL-MCNC: 10 NG/ML — LOW (ref 13–330)
GLUCOSE SERPL-MCNC: 91 MG/DL — SIGNIFICANT CHANGE UP (ref 70–99)
HCT VFR BLD CALC: 28.6 % — LOW (ref 37–47)
HGB BLD-MCNC: 7.9 G/DL — LOW (ref 12–16)
IMM GRANULOCYTES NFR BLD AUTO: 0.8 % — HIGH (ref 0.1–0.3)
LYMPHOCYTES # BLD AUTO: 1.62 K/UL — SIGNIFICANT CHANGE UP (ref 1.2–3.4)
LYMPHOCYTES # BLD AUTO: 17.6 % — LOW (ref 20.5–51.1)
MAGNESIUM SERPL-MCNC: 1.7 MG/DL — LOW (ref 1.8–2.4)
MCHC RBC-ENTMCNC: 19.8 PG — LOW (ref 27–31)
MCHC RBC-ENTMCNC: 27.6 G/DL — LOW (ref 32–37)
MCV RBC AUTO: 71.7 FL — LOW (ref 81–99)
MONOCYTES # BLD AUTO: 0.77 K/UL — HIGH (ref 0.1–0.6)
MONOCYTES NFR BLD AUTO: 8.4 % — SIGNIFICANT CHANGE UP (ref 1.7–9.3)
NEUTROPHILS # BLD AUTO: 6.59 K/UL — HIGH (ref 1.4–6.5)
NEUTROPHILS NFR BLD AUTO: 71.5 % — SIGNIFICANT CHANGE UP (ref 42.2–75.2)
NRBC # BLD: 0 /100 WBCS — SIGNIFICANT CHANGE UP (ref 0–0)
PLATELET # BLD AUTO: 462 K/UL — HIGH (ref 130–400)
PMV BLD: 8.5 FL — SIGNIFICANT CHANGE UP (ref 7.4–10.4)
POTASSIUM SERPL-MCNC: 4.8 MMOL/L — SIGNIFICANT CHANGE UP (ref 3.5–5)
POTASSIUM SERPL-SCNC: 4.8 MMOL/L — SIGNIFICANT CHANGE UP (ref 3.5–5)
PROT SERPL-MCNC: 6.1 G/DL — SIGNIFICANT CHANGE UP (ref 6–8)
RAPID RVP RESULT: SIGNIFICANT CHANGE UP
RBC # BLD: 3.99 M/UL — LOW (ref 4.2–5.4)
RBC # FLD: 21.2 % — HIGH (ref 11.5–14.5)
SARS-COV-2 RNA SPEC QL NAA+PROBE: SIGNIFICANT CHANGE UP
SODIUM SERPL-SCNC: 134 MMOL/L — LOW (ref 135–146)
WBC # BLD: 9.21 K/UL — SIGNIFICANT CHANGE UP (ref 4.8–10.8)
WBC # FLD AUTO: 9.21 K/UL — SIGNIFICANT CHANGE UP (ref 4.8–10.8)

## 2024-05-02 PROCEDURE — 99232 SBSQ HOSP IP/OBS MODERATE 35: CPT

## 2024-05-02 PROCEDURE — 99223 1ST HOSP IP/OBS HIGH 75: CPT

## 2024-05-02 RX ORDER — MAGNESIUM SULFATE 500 MG/ML
2 VIAL (ML) INJECTION
Refills: 0 | Status: COMPLETED | OUTPATIENT
Start: 2024-05-02 | End: 2024-05-02

## 2024-05-02 RX ORDER — IRON SUCROSE 20 MG/ML
200 INJECTION, SOLUTION INTRAVENOUS EVERY 24 HOURS
Refills: 0 | Status: DISCONTINUED | OUTPATIENT
Start: 2024-05-02 | End: 2024-05-06

## 2024-05-02 RX ORDER — FUROSEMIDE 40 MG
40 TABLET ORAL
Refills: 0 | Status: DISCONTINUED | OUTPATIENT
Start: 2024-05-02 | End: 2024-05-03

## 2024-05-02 RX ORDER — IPRATROPIUM/ALBUTEROL SULFATE 18-103MCG
3 AEROSOL WITH ADAPTER (GRAM) INHALATION EVERY 6 HOURS
Refills: 0 | Status: DISCONTINUED | OUTPATIENT
Start: 2024-05-02 | End: 2024-05-13

## 2024-05-02 RX ORDER — BUDESONIDE AND FORMOTEROL FUMARATE DIHYDRATE 160; 4.5 UG/1; UG/1
2 AEROSOL RESPIRATORY (INHALATION)
Refills: 0 | Status: DISCONTINUED | OUTPATIENT
Start: 2024-05-02 | End: 2024-05-13

## 2024-05-02 RX ADMIN — Medication 40 MILLIGRAM(S): at 14:09

## 2024-05-02 RX ADMIN — Medication 100 MILLIGRAM(S): at 17:11

## 2024-05-02 RX ADMIN — Medication 100 MILLIGRAM(S): at 06:01

## 2024-05-02 RX ADMIN — Medication 25 GRAM(S): at 14:10

## 2024-05-02 RX ADMIN — Medication 1 APPLICATION(S): at 17:13

## 2024-05-02 RX ADMIN — ARIPIPRAZOLE 5 MILLIGRAM(S): 15 TABLET ORAL at 21:20

## 2024-05-02 RX ADMIN — Medication 2 MILLIGRAM(S): at 17:11

## 2024-05-02 RX ADMIN — Medication 650 MILLIGRAM(S): at 10:35

## 2024-05-02 RX ADMIN — Medication 2 MILLIGRAM(S): at 06:01

## 2024-05-02 RX ADMIN — IRON SUCROSE 110 MILLIGRAM(S): 20 INJECTION, SOLUTION INTRAVENOUS at 12:05

## 2024-05-02 RX ADMIN — PANTOPRAZOLE SODIUM 40 MILLIGRAM(S): 20 TABLET, DELAYED RELEASE ORAL at 06:02

## 2024-05-02 RX ADMIN — BUDESONIDE AND FORMOTEROL FUMARATE DIHYDRATE 2 PUFF(S): 160; 4.5 AEROSOL RESPIRATORY (INHALATION) at 21:20

## 2024-05-02 RX ADMIN — Medication 3 MILLILITER(S): at 21:20

## 2024-05-02 RX ADMIN — Medication 650 MILLIGRAM(S): at 09:56

## 2024-05-02 RX ADMIN — PANTOPRAZOLE SODIUM 40 MILLIGRAM(S): 20 TABLET, DELAYED RELEASE ORAL at 17:12

## 2024-05-02 RX ADMIN — Medication 25 GRAM(S): at 16:14

## 2024-05-02 NOTE — PATIENT PROFILE ADULT - FALL HARM RISK - RISK INTERVENTIONS

## 2024-05-02 NOTE — CONSULT NOTE ADULT - SUBJECTIVE AND OBJECTIVE BOX
Gastroenterology Consultation:    Patient is a 68y old  Female who presents with a chief complaint of shortness of breath (01 May 2024 00:46)        Admitted on: 24      HPI:  68 years old female with PMH of  htn, hdl, bipolar disorder, copd on 2L home O2, chf, chronic lymphedema, asthma presents to ED with c/o shortness of breath and le redness and swelling. Patient noticed b/l le redness that worsened x1 week. Patient also reports shortness of breath from  several days. Pt denies f/c, chest pain, hemoptysis, vomiting, dark/bloody stool. no ac use.  Patient refused to provide with further details saying she wants to sleep, only allowed me to do brief physical exam, she is not wheezing on auscultation, has b/l lower extremity edema with redness (concerning for venous stasis).    In ED   Vital Signs Last 24 Hrs)  T(F): 98  HR: 76   BP: 134/63   RR: 18   SpO2: 92%  on RA    Labs significant for Hb: 7.6 (15.9 in 2023),   VBG:  pH: 7.31, PCO2: 69, HCO3: 35  On Exam: chest b/l clear not wheezing  b/l lower extremity edema with redness   Patient received antibiotics (aztreonem and flagyl), nebs, solu mederol in ED    Patient being admitted to medicine for sob and questionable cellulitis.  (01 May 2024 00:46)        Prior EGD:    Prior Colonoscopy:      PAST MEDICAL & SURGICAL HISTORY:  COPD (chronic obstructive pulmonary disease)      Asthma      Lymphedema      Bipolar disorder      CHF (congestive heart failure)      History of hernia repair      History of D&C      H/O  section            FAMILY HISTORY:  FH: leukemia (Sibling)        Social History:  Tobacco:  Alcohol:  Drugs:    Home Medications:  Abilify 5 mg oral tablet: 1 tab(s) orally once a day (at bedtime) (13 Dec 2023 11:07)  Albuterol (Eqv-ProAir HFA) 90 mcg/inh inhalation aerosol: 2 puff(s) inhaled as needed for  bronchospasm (13 Dec 2023 11:07)  ammonium lactate 12% topical cream: Apply topically to affected area 2 times a day as needed for  itching (13 Dec 2023 11:07)  Anoro Ellipta 62.5 mcg-25 mcg/inh inhalation powder: 1 puff(s) inhaled once a day (13 Dec 2023 11:07)  Artane 2 mg oral tablet: 2 tab(s) orally 2 times a day (13 Dec 2023 11:07)  Breo Ellipta 200 mcg-25 mcg/inh inhalation powder: 1 puff(s) inhaled once a day (2023 11:55)  loratadine 10 mg oral tablet: 1 tab(s) orally once a day (2023 11:55)        MEDICATIONS  (STANDING):  ARIPiprazole 5 milliGRAM(s) Oral at bedtime  doxycycline monohydrate Capsule 100 milliGRAM(s) Oral every 12 hours  ferrous    sulfate 325 milliGRAM(s) Oral daily  loratadine 10 milliGRAM(s) Oral daily  pantoprazole  Injectable 40 milliGRAM(s) IV Push two times a day  trihexyphenidyl 2 milliGRAM(s) Oral two times a day    MEDICATIONS  (PRN):  acetaminophen     Tablet .. 650 milliGRAM(s) Oral every 6 hours PRN Temp greater or equal to 38C (100.4F), Mild Pain (1 - 3), Moderate Pain (4 - 6), Severe Pain (7 - 10)  albuterol    90 MICROgram(s) HFA Inhaler 2 Puff(s) Inhalation every 6 hours PRN for bronchospasm      Allergies  penicillin (Short breath)  eggs (Short breath)      Review of Systems:   Constitutional:  No Fever, No Chills  ENT/Mouth:  No Hearing Changes,  No Difficulty Swallowing  Eyes:  No Eye Pain, No Vision Changes  Cardiovascular:  No Chest Pain, No Palpitations  Respiratory:  No Cough, No Dyspnea  Gastrointestinal:  As described in HPI          Physical Examination:  T(C): 36.7 (24 @ 06:18), Max: 36.9 (24 @ 23:38)  HR: 73 (24 @ 06:18) (70 - 85)  BP: 129/61 (24 @ 06:18) (118/59 - 163/72)  RR: 18 (24 @ 06:18) (18 - 20)  SpO2: 100% (24 @ 06:18) (96% - 100%)          GENERAL: AAOx3, no acute distress.  HEAD:  Atraumatic, Normocephalic  EYES: conjunctiva and sclera clear  CHEST/LUNG: Clear to auscultation bilaterally; No wheeze, rhonchi, or rales  HEART: Regular rate and rhythm; normal S1, S2, No murmurs.  ABDOMEN: Soft, nontender, nondistended; Bowel sounds present  SKIN: Intact, no jaundice        Data:                        7.8    7.27  )-----------( 499      ( 01 May 2024 11:29 )             27.6     Hgb Trend:  7.8  24 @ 11:29  7.6  24 @ 19:00          134<L>  |  95<L>  |  5<L>  ----------------------------<  92  4.5   |  27  |  <0.5<L>    Ca    9.0      2024 19:00  Mg     1.9         TPro  6.0  /  Alb  3.4<L>  /  TBili  <0.2  /  DBili  x   /  AST  19  /  ALT  10  /  AlkPhos  143<H>      Liver panel trend:  TBili <0.2   /   AST 19   /   ALT 10   /   AlkP 143   /   Tptn 6.0   /   Alb 3.4    /   DBili --                    Radiology:       Gastroenterology Consultation:    Patient is a 68y old  Female who presents with a chief complaint of shortness of breath (01 May 2024 00:46)        Admitted on: 24      HPI:  68 years old female with PMH of  htn, hdl, bipolar disorder, copd on 2L home O2, chf, chronic lymphedema, asthma presents to ED with c/o shortness of breath and le redness and swelling. Patient noticed b/l le redness that worsened x1 week. Patient also reports shortness of breath from  several days. Pt denies f/c, chest pain, hemoptysis, vomiting, dark/bloody stool. no ac use.  Patient refused to provide with further details saying she wants to sleep, only allowed me to do brief physical exam, she is not wheezing on auscultation, has b/l lower extremity edema with redness (concerning for venous stasis).        Prior EGD:    Prior Colonoscopy:      PAST MEDICAL & SURGICAL HISTORY:  COPD (chronic obstructive pulmonary disease)      Asthma      Lymphedema      Bipolar disorder      CHF (congestive heart failure)      History of hernia repair      History of D&C      H/O  section            FAMILY HISTORY:  FH: leukemia (Sibling)        Social History:  Tobacco:  Alcohol:  Drugs:    Home Medications:  Abilify 5 mg oral tablet: 1 tab(s) orally once a day (at bedtime) (13 Dec 2023 11:07)  Albuterol (Eqv-ProAir HFA) 90 mcg/inh inhalation aerosol: 2 puff(s) inhaled as needed for  bronchospasm (13 Dec 2023 11:07)  ammonium lactate 12% topical cream: Apply topically to affected area 2 times a day as needed for  itching (13 Dec 2023 11:07)  Anoro Ellipta 62.5 mcg-25 mcg/inh inhalation powder: 1 puff(s) inhaled once a day (13 Dec 2023 11:07)  Artane 2 mg oral tablet: 2 tab(s) orally 2 times a day (13 Dec 2023 11:07)  Breo Ellipta 200 mcg-25 mcg/inh inhalation powder: 1 puff(s) inhaled once a day (2023 11:55)  loratadine 10 mg oral tablet: 1 tab(s) orally once a day (2023 11:55)        MEDICATIONS  (STANDING):  ARIPiprazole 5 milliGRAM(s) Oral at bedtime  doxycycline monohydrate Capsule 100 milliGRAM(s) Oral every 12 hours  ferrous    sulfate 325 milliGRAM(s) Oral daily  loratadine 10 milliGRAM(s) Oral daily  pantoprazole  Injectable 40 milliGRAM(s) IV Push two times a day  trihexyphenidyl 2 milliGRAM(s) Oral two times a day    MEDICATIONS  (PRN):  acetaminophen     Tablet .. 650 milliGRAM(s) Oral every 6 hours PRN Temp greater or equal to 38C (100.4F), Mild Pain (1 - 3), Moderate Pain (4 - 6), Severe Pain (7 - 10)  albuterol    90 MICROgram(s) HFA Inhaler 2 Puff(s) Inhalation every 6 hours PRN for bronchospasm      Allergies  penicillin (Short breath)  eggs (Short breath)      Review of Systems:   Constitutional:  No Fever, No Chills  ENT/Mouth:  No Hearing Changes,  No Difficulty Swallowing  Eyes:  No Eye Pain, No Vision Changes  Cardiovascular:  No Chest Pain, No Palpitations  Respiratory:  No Cough, No Dyspnea  Gastrointestinal:  As described in HPI          Physical Examination:  T(C): 36.7 (24 @ 06:18), Max: 36.9 (24 @ 23:38)  HR: 73 (24 @ 06:18) (70 - 85)  BP: 129/61 (24 @ 06:18) (118/59 - 163/72)  RR: 18 (24 @ 06:18) (18 - 20)  SpO2: 100% (24 @ 06:18) (96% - 100%)          GENERAL: AAOx3, no acute distress.  HEAD:  Atraumatic, Normocephalic  EYES: conjunctiva and sclera clear  CHEST/LUNG: Clear to auscultation bilaterally; No wheeze, rhonchi, or rales  HEART: Regular rate and rhythm; normal S1, S2, No murmurs.  ABDOMEN: Soft, nontender, nondistended; Bowel sounds present  SKIN: Intact, no jaundice        Data:                        7.8    7.27  )-----------( 499      ( 01 May 2024 11:29 )             27.6     Hgb Trend:  7.8  24 @ 11:29  7.6  24 @ 19:00      04    134<L>  |  95<L>  |  5<L>  ----------------------------<  92  4.5   |  27  |  <0.5<L>    Ca    9.0      2024 19:00  Mg     1.9         TPro  6.0  /  Alb  3.4<L>  /  TBili  <0.2  /  DBili  x   /  AST  19  /  ALT  10  /  AlkPhos  143<H>      Liver panel trend:  TBili <0.2   /   AST 19   /   ALT 10   /   AlkP 143   /   Tptn 6.0   /   Alb 3.4    /   DBili --                    Radiology:       Gastroenterology Consultation:    Patient is a 68y old  Female who presents with a chief complaint of shortness of breath (01 May 2024 00:46)        Admitted on: 24      HPI:  68 years old female with PMH of  htn, hdl, bipolar disorder, copd on 2L home O2, chf, chronic lymphedema, asthma presents to ED with c/o shortness of breath and LE erythema and swelling x2 weeks. GI consulted for acute on chronic anemia. Patient very frustrated on my interview, reported that she had hematemesis 2-3 weeks and was taken to [facility] s/p EGD found to have an ulcer and was discharged. Patient currently denies any blood per rectum, no unintentional weight loss infact weight gain, denies swallowing difficulty. PAtient reports abdominal pain around the hernia site. Refused LUNA.         Prior EGD: 2 weeks ago at [facility]? ulcer    Prior Colonoscopy:patient unsure      PAST MEDICAL & SURGICAL HISTORY:  COPD (chronic obstructive pulmonary disease)      Asthma      Lymphedema      Bipolar disorder      CHF (congestive heart failure)      History of hernia repair      History of D&C      H/O  section        FAMILY HISTORY:  FH: leukemia (Sibling)    NO GI related malignancies/disorders/IBD    Social History:  Tobacco: denies  Alcohol: denies  Drugs: denies    Home Medications:  Abilify 5 mg oral tablet: 1 tab(s) orally once a day (at bedtime) (13 Dec 2023 11:07)  Albuterol (Eqv-ProAir HFA) 90 mcg/inh inhalation aerosol: 2 puff(s) inhaled as needed for  bronchospasm (13 Dec 2023 11:07)  ammonium lactate 12% topical cream: Apply topically to affected area 2 times a day as needed for  itching (13 Dec 2023 11:07)  Anoro Ellipta 62.5 mcg-25 mcg/inh inhalation powder: 1 puff(s) inhaled once a day (13 Dec 2023 11:07)  Artane 2 mg oral tablet: 2 tab(s) orally 2 times a day (13 Dec 2023 11:07)  Breo Ellipta 200 mcg-25 mcg/inh inhalation powder: 1 puff(s) inhaled once a day (2023 11:55)  loratadine 10 mg oral tablet: 1 tab(s) orally once a day (2023 11:55)        MEDICATIONS  (STANDING):  ARIPiprazole 5 milliGRAM(s) Oral at bedtime  doxycycline monohydrate Capsule 100 milliGRAM(s) Oral every 12 hours  ferrous    sulfate 325 milliGRAM(s) Oral daily  loratadine 10 milliGRAM(s) Oral daily  pantoprazole  Injectable 40 milliGRAM(s) IV Push two times a day  trihexyphenidyl 2 milliGRAM(s) Oral two times a day    MEDICATIONS  (PRN):  acetaminophen     Tablet .. 650 milliGRAM(s) Oral every 6 hours PRN Temp greater or equal to 38C (100.4F), Mild Pain (1 - 3), Moderate Pain (4 - 6), Severe Pain (7 - 10)  albuterol    90 MICROgram(s) HFA Inhaler 2 Puff(s) Inhalation every 6 hours PRN for bronchospasm      Allergies  penicillin (Short breath)  eggs (Short breath)      Review of Systems:   Constitutional:  No Fever, No Chills  ENT/Mouth:  No Hearing Changes,  No Difficulty Swallowing  Eyes:  No Eye Pain, No Vision Changes  Cardiovascular:  No Chest Pain, No Palpitations  Respiratory:  No Cough  Gastrointestinal:  As described in HPI          Physical Examination:  T(C): 36.7 (24 @ 06:18), Max: 36.9 (24 @ 23:38)  HR: 73 (24 @ 06:18) (70 - 85)  BP: 129/61 (24 @ 06:18) (118/59 - 163/72)  RR: 18 (24 @ 06:18) (18 - 20)  SpO2: 100% (24 @ 06:18) (96% - 100%)          GENERAL: AAOx3, dyspenic at rest  HEAD:  Atraumatic, Normocephalic  EYES: conjunctiva and sclera clear  CHEST/LUNG: decreased BS  HEART: Regular rate and rhythm; normal S1, S2, No murmurs.  ABDOMEN: Soft, tender around the umbilicus, nondistended; Bowel sounds present  SKIN: Intact, no jaundice        Data:                        7.8    7.27  )-----------( 499      ( 01 May 2024 11:29 )             27.6     Hgb Trend:  7.8  24 @ 11:29  7.6  24 @ 19:00          134<L>  |  95<L>  |  5<L>  ----------------------------<  92  4.5   |  27  |  <0.5<L>    Ca    9.0      2024 19:00  Mg     1.9         TPro  6.0  /  Alb  3.4<L>  /  TBili  <0.2  /  DBili  x   /  AST  19  /  ALT  10  /  AlkPhos  143<H>      Liver panel trend:  TBili <0.2   /   AST 19   /   ALT 10   /   AlkP 143   /   Tptn 6.0   /   Alb 3.4    /   DBili --

## 2024-05-02 NOTE — CONSULT NOTE ADULT - ASSESSMENT
68 years old female with PMH of  htn, hdl, bipolar disorder, copd on 2L home O2, chf, chronic lymphedema, asthma, sp hernia repair presents to ED with c/o shortness of breath and LE erythema and swelling x2 weeks. GI consulted for acute on chronic anemia.     #Acute microcytic anemia with NARCISA - no overt GI bleed  - Hemodynamically stable & no active bleeding  - Baseline hemoglobin - in 11/23: 15  - Hemoglobin on admission - 7.6, MCV 70.9   - iron: 19, %sat: 6, TIBC: 332  - patient is on PO iron therapy  - not on AC  - patient reports PUD s/p EGD at [facility] 2-3 weeks ago  - patient frustrated on exam ; didnt want to answer many questions, refused LUNA  - dyspneic on exam      #Rec  - please obtain EGD records from [facility]   - recommend abdominal imaging given pain around hernia site with surgery follow up  - Please start pantoprazole 40 PO BID  - Maintain active Type and screen  - Trend H&H BID  - Please place x2 18G IVs  - Please correct INR to <1.5  - Please target Hb  >8; recommend iron transfusions if no c/i  - Please avoid any NSAIDs  - monitor BMs    Communicated with primary team

## 2024-05-02 NOTE — PROGRESS NOTE ADULT - SUBJECTIVE AND OBJECTIVE BOX
MARIA EUGENIA CHAMBERLAIN  68y  [facility]-N ED Hold 052 A      Patient is a 68y old  Female who presents with a chief complaint of shortness of breath (02 May 2024 06:58)      INTERVAL HPI/OVERNIGHT EVENTS:        REVIEW OF SYSTEMS:        FAMILY HISTORY:  FH: leukemia (Sibling)      T(C): 36.7 (05-02-24 @ 06:18), Max: 36.9 (05-01-24 @ 23:38)  HR: 73 (05-02-24 @ 06:18) (70 - 85)  BP: 129/61 (05-02-24 @ 06:18) (118/59 - 129/61)  RR: 18 (05-02-24 @ 06:18) (18 - 19)  SpO2: 100% (05-02-24 @ 06:18) (97% - 100%)  Wt(kg): --Vital Signs Last 24 Hrs  T(C): 36.7 (02 May 2024 06:18), Max: 36.9 (01 May 2024 23:38)  T(F): 98 (02 May 2024 06:18), Max: 98.4 (01 May 2024 23:38)  HR: 73 (02 May 2024 06:18) (70 - 85)  BP: 129/61 (02 May 2024 06:18) (118/59 - 129/61)  BP(mean): 85 (01 May 2024 23:38) (85 - 85)  RR: 18 (02 May 2024 06:18) (18 - 19)  SpO2: 100% (02 May 2024 06:18) (97% - 100%)    Parameters below as of 02 May 2024 06:18  Patient On (Oxygen Delivery Method): nasal cannula  O2 Flow (L/min): 3      PHYSICAL EXAM:  GENERAL: NAD, well-groomed, well-developed  HEAD:  Atraumatic, Normocephalic  EYES: EOMI, PERRLA, conjunctiva and sclera clear  ENMT: No tonsillar erythema, exudates, or enlargement; Moist mucous membranes, Good dentition, No lesions  NECK: Supple, No JVD, Normal thyroid  NERVOUS SYSTEM:  Alert & Oriented X3, Good concentration; Motor Strength 5/5 B/L upper and lower extremities; DTRs 2+ intact and symmetric  PULM: Clear to auscultation bilaterally  CARDIAC: Regular rate and rhythm; No murmurs, rubs, or gallops  GI: Soft, Nontender, Nondistended; Bowel sounds present  EXTREMITIES:  2+ Peripheral Pulses, No clubbing, cyanosis, or edema  LYMPH: No lymphadenopathy noted  SKIN: No rashes or lesions    Consultant(s) Notes Reviewed:  [x ] YES  [ ] NO  Care Discussed with Consultants/Other Providers [ x] YES  [ ] NO    LABS:                            7.8    7.27  )-----------( 499      ( 01 May 2024 11:29 )             27.6   04-30    134<L>  |  95<L>  |  5<L>  ----------------------------<  92  4.5   |  27  |  <0.5<L>    Ca    9.0      30 Apr 2024 19:00  Mg     1.9     04-30    TPro  6.0  /  Alb  3.4<L>  /  TBili  <0.2  /  DBili  x   /  AST  19  /  ALT  10  /  AlkPhos  143<H>  04-30            acetaminophen     Tablet .. 650 milliGRAM(s) Oral every 6 hours PRN  albuterol    90 MICROgram(s) HFA Inhaler 2 Puff(s) Inhalation every 6 hours PRN  ARIPiprazole 5 milliGRAM(s) Oral at bedtime  doxycycline monohydrate Capsule 100 milliGRAM(s) Oral every 12 hours  ferrous    sulfate 325 milliGRAM(s) Oral daily  loratadine 10 milliGRAM(s) Oral daily  pantoprazole  Injectable 40 milliGRAM(s) IV Push two times a day  trihexyphenidyl 2 milliGRAM(s) Oral two times a day        68 years old female with PMH of  htn, hld, bipolar disorder, copd on 2L home O2, chf, chronic lipedema, asthma presents to ED with c/o shortness of breath and le redness and swelling.      1. Shortness of breath secondary to Acute CHF (orthopnea and no wheezing) tirggered by new worsening microcytic anemia . hx of COPD   * on 2L home O2, but now refusing to use  oxygen at hospital (satting 92%)  * pt reported that she had bleeding from PUD but not clear if any intervention was done. Need to get records from [facility]   - VBG:  pH: 7.31, PCO2: 69, HCO3: 35. might be obesity hypoventilation   - Admit to medicine                 - ECG :TWI V1, V2                     - CXR:  Interstitial infiltrate. BNP not that elevated but pt is obese            -LUNA: negative   - Cont  lasix 40mg IV bid   - PPI IV bid   - Trop negative *2   - Oxygen protocol now on 3 liter   - Start venofer 200mg IV d:1 (Should be safe 24 hour after abs coverage)   - GI consult:pending   *  s/p aztreonem, flagyl, solumedrol and nebs in ED for COPD exacerbation     2.Possible  Lower extremity cellulitis Left > Right in a pt with lipedema  * Pt with baseline lipedema cuff sign  noted   - CRP:pending  - Cont doxycycline d:2   - Leonidas the erythema area  - Keep left leg elevated  - venous duplex:WNL   - PT eval due to fall risk:pending     3.Hypercapnea likely due to obesity hypoventilation syndrome   - Repeat ABG:pending   - Need pulm follow up as outpatient     4. DVT/GI px.   MARIA EUGENIA CHAMBERLAIN  68y  [facility]-N ED Hold 052 A      Patient is a 68y old  Female who presents with a chief complaint of shortness of breath (02 May 2024 06:58)      INTERVAL HPI/OVERNIGHT EVENTS:        REVIEW OF SYSTEMS:        FAMILY HISTORY:  FH: leukemia (Sibling)      T(C): 36.7 (05-02-24 @ 06:18), Max: 36.9 (05-01-24 @ 23:38)  HR: 73 (05-02-24 @ 06:18) (70 - 85)  BP: 129/61 (05-02-24 @ 06:18) (118/59 - 129/61)  RR: 18 (05-02-24 @ 06:18) (18 - 19)  SpO2: 100% (05-02-24 @ 06:18) (97% - 100%)  Wt(kg): --Vital Signs Last 24 Hrs  T(C): 36.7 (02 May 2024 06:18), Max: 36.9 (01 May 2024 23:38)  T(F): 98 (02 May 2024 06:18), Max: 98.4 (01 May 2024 23:38)  HR: 73 (02 May 2024 06:18) (70 - 85)  BP: 129/61 (02 May 2024 06:18) (118/59 - 129/61)  BP(mean): 85 (01 May 2024 23:38) (85 - 85)  RR: 18 (02 May 2024 06:18) (18 - 19)  SpO2: 100% (02 May 2024 06:18) (97% - 100%)    Parameters below as of 02 May 2024 06:18  Patient On (Oxygen Delivery Method): nasal cannula  O2 Flow (L/min): 3      PHYSICAL EXAM:  GENERAL: NAD, well-groomed, well-developed  HEAD:  Atraumatic, Normocephalic  EYES: EOMI, PERRLA, conjunctiva and sclera clear  ENMT: No tonsillar erythema, exudates, or enlargement; Moist mucous membranes, Good dentition, No lesions  NECK: Supple, No JVD, Normal thyroid  NERVOUS SYSTEM:  Alert & Oriented X3, Good concentration; Motor Strength 5/5 B/L upper and lower extremities; DTRs 2+ intact and symmetric  PULM: Clear to auscultation bilaterally  CARDIAC: Regular rate and rhythm; No murmurs, rubs, or gallops  GI: Soft, Nontender, Nondistended; Bowel sounds present  EXTREMITIES:  2+ Peripheral Pulses, No clubbing, cyanosis, or edema  LYMPH: No lymphadenopathy noted  SKIN: No rashes or lesions    Consultant(s) Notes Reviewed:  [x ] YES  [ ] NO  Care Discussed with Consultants/Other Providers [ x] YES  [ ] NO    LABS:                            7.8    7.27  )-----------( 499      ( 01 May 2024 11:29 )             27.6   04-30    134<L>  |  95<L>  |  5<L>  ----------------------------<  92  4.5   |  27  |  <0.5<L>    Ca    9.0      30 Apr 2024 19:00  Mg     1.9     04-30    TPro  6.0  /  Alb  3.4<L>  /  TBili  <0.2  /  DBili  x   /  AST  19  /  ALT  10  /  AlkPhos  143<H>  04-30            acetaminophen     Tablet .. 650 milliGRAM(s) Oral every 6 hours PRN  albuterol    90 MICROgram(s) HFA Inhaler 2 Puff(s) Inhalation every 6 hours PRN  ARIPiprazole 5 milliGRAM(s) Oral at bedtime  doxycycline monohydrate Capsule 100 milliGRAM(s) Oral every 12 hours  ferrous    sulfate 325 milliGRAM(s) Oral daily  loratadine 10 milliGRAM(s) Oral daily  pantoprazole  Injectable 40 milliGRAM(s) IV Push two times a day  trihexyphenidyl 2 milliGRAM(s) Oral two times a day        68 years old female with PMH of  htn, hld, bipolar disorder, copd on 2L home O2, chf, chronic lipedema, asthma presents to ED with c/o shortness of breath and le redness and swelling.      1. Shortness of breath secondary to Acute CHF (orthopnea and no wheezing) tirggered by new worsening microcytic anemia . hx of COPD   * on 2L home O2, but now refusing to use  oxygen at hospital (satting 92%)  * pt reported that she had bleeding from PUD but not clear if any intervention was done. Need to get records from [facility]   - VBG:  pH: 7.31, PCO2: 69, HCO3: 35. might be obesity hypoventilation   - Admit to medicine                 - ECG :TWI V1, V2                     - CXR:  Interstitial infiltrate. BNP not that elevated but pt is obese            -LUNA: negative   - Cont  lasix 40mg IV bid   - PPI IV bid   - Trop negative *2   - Oxygen protocol now on 3 liter   - Start venofer 200mg IV d:1 (Should be safe 24 hour after abs coverage)   - GI consult:pending   *  s/p aztreonem, flagyl, solumedrol and nebs in ED for COPD exacerbation     2.Possible  Lower extremity cellulitis Left > Right in a pt with lipedema  * Pt with baseline lipedema cuff sign  noted   - CRP:pending  - Cont doxycycline d:2   - Leonidas the erythema area  - Keep left leg elevated  - venous duplex:WNL   - PT eval due to fall risk:pending     3.Hypercapnea likely due to obesity hypoventilation syndrome   - Repeat ABG:pending   - Need pulm follow up as outpatient .    4. DVT/GI px.   MARIA EUGENIA CHAMBERLAIN  68y  [facility]-N ED Hold 052 A      Patient is a 68y old  Female who presents with a chief complaint of shortness of breath (02 May 2024 06:58)      INTERVAL HPI/OVERNIGHT EVENTS:        REVIEW OF SYSTEMS:        FAMILY HISTORY:  FH: leukemia (Sibling)      T(C): 36.7 (05-02-24 @ 06:18), Max: 36.9 (05-01-24 @ 23:38)  HR: 73 (05-02-24 @ 06:18) (70 - 85)  BP: 129/61 (05-02-24 @ 06:18) (118/59 - 129/61)  RR: 18 (05-02-24 @ 06:18) (18 - 19)  SpO2: 100% (05-02-24 @ 06:18) (97% - 100%)  Wt(kg): --Vital Signs Last 24 Hrs  T(C): 36.7 (02 May 2024 06:18), Max: 36.9 (01 May 2024 23:38)  T(F): 98 (02 May 2024 06:18), Max: 98.4 (01 May 2024 23:38)  HR: 73 (02 May 2024 06:18) (70 - 85)  BP: 129/61 (02 May 2024 06:18) (118/59 - 129/61)  BP(mean): 85 (01 May 2024 23:38) (85 - 85)  RR: 18 (02 May 2024 06:18) (18 - 19)  SpO2: 100% (02 May 2024 06:18) (97% - 100%)    Parameters below as of 02 May 2024 06:18  Patient On (Oxygen Delivery Method): nasal cannula  O2 Flow (L/min): 3      PHYSICAL EXAM:  GENERAL: NAD, well-groomed, well-developed  HEAD:  Atraumatic, Normocephalic  EYES: EOMI, PERRLA, conjunctiva and sclera clear  ENMT: No tonsillar erythema, exudates, or enlargement; Moist mucous membranes, Good dentition, No lesions  NECK: Supple, No JVD, Normal thyroid  NERVOUS SYSTEM:  Alert & Oriented X3, Good concentration; Motor Strength 5/5 B/L upper and lower extremities; DTRs 2+ intact and symmetric  PULM: Clear to auscultation bilaterally  CARDIAC: Regular rate and rhythm; No murmurs, rubs, or gallops  GI: Soft, Nontender, Nondistended; Bowel sounds present  EXTREMITIES:  2+ Peripheral Pulses, No clubbing, cyanosis, or edema  LYMPH: No lymphadenopathy noted  SKIN: No rashes or lesions    Consultant(s) Notes Reviewed:  [x ] YES  [ ] NO  Care Discussed with Consultants/Other Providers [ x] YES  [ ] NO    LABS:                            7.8    7.27  )-----------( 499      ( 01 May 2024 11:29 )             27.6   04-30    134<L>  |  95<L>  |  5<L>  ----------------------------<  92  4.5   |  27  |  <0.5<L>    Ca    9.0      30 Apr 2024 19:00  Mg     1.9     04-30    TPro  6.0  /  Alb  3.4<L>  /  TBili  <0.2  /  DBili  x   /  AST  19  /  ALT  10  /  AlkPhos  143<H>  04-30            acetaminophen     Tablet .. 650 milliGRAM(s) Oral every 6 hours PRN  albuterol    90 MICROgram(s) HFA Inhaler 2 Puff(s) Inhalation every 6 hours PRN  ARIPiprazole 5 milliGRAM(s) Oral at bedtime  doxycycline monohydrate Capsule 100 milliGRAM(s) Oral every 12 hours  ferrous    sulfate 325 milliGRAM(s) Oral daily  loratadine 10 milliGRAM(s) Oral daily  pantoprazole  Injectable 40 milliGRAM(s) IV Push two times a day  trihexyphenidyl 2 milliGRAM(s) Oral two times a day        68 years old female with PMH of  htn, hld, bipolar disorder, copd on 2L home O2, chf, chronic lipedema, asthma presents to ED with c/o shortness of breath and le redness and swelling.      1. Shortness of breath secondary to Acute CHF (orthopnea and no wheezing) tirggered by new worsening microcytic anemia . hx of COPD   * on 2L home O2, but now refusing to use  oxygen at hospital (satting 92%)  * pt reported that she had bleeding from PUD but not clear if any intervention was done. Need to get records from [facility]   - VBG:  pH: 7.31, PCO2: 69, HCO3: 35. might be obesity hypoventilation   - Admit to medicine                 - ECG :TWI V1, V2                     - CXR:  Interstitial infiltrate. BNP not that elevated but pt is obese            -LUNA: negative   - Cont  lasix 40mg IV bid   - PPI IV bid   - Trop negative *2   - Oxygen protocol now on 3 liter   - Start venofer 200mg IV d:1 (Should be safe 24 hour after abs coverage)   - GI consult:pending   *  s/p aztreonem, flagyl, solumedrol and nebs in ED for COPD exacerbation     2.Possible  Lower extremity cellulitis Left > Right in a pt with lipedema  * Pt with baseline lipedema cuff sign  noted   - CRP:pending  - Cont doxycycline d:2   - Leonidas the erythema area  - Keep left leg elevated  - venous duplex:WNL   - PT eval due to fall risk:pending     3.Hypercapnea likely due to obesity hypoventilation syndrome   - Repeat ABG:pending   - Need pulm follow up as outpatient    4. DVT/GI px.   BULMARO MARIA EUGENIA  68y  [facility]-N ED Hold 052 A      Patient is a 68y old  Female who presents with a chief complaint of shortness of breath (02 May 2024 06:58)      INTERVAL HPI/OVERNIGHT EVENTS:    Patient is slightly sob with faint wheezing  lower leg stable   no overnight events           FAMILY HISTORY:  FH: leukemia (Sibling)      T(C): 36.7 (05-02-24 @ 06:18), Max: 36.9 (05-01-24 @ 23:38)  HR: 73 (05-02-24 @ 06:18) (70 - 85)  BP: 129/61 (05-02-24 @ 06:18) (118/59 - 129/61)  RR: 18 (05-02-24 @ 06:18) (18 - 19)  SpO2: 100% (05-02-24 @ 06:18) (97% - 100%)  Wt(kg): --Vital Signs Last 24 Hrs  T(C): 36.7 (02 May 2024 06:18), Max: 36.9 (01 May 2024 23:38)  T(F): 98 (02 May 2024 06:18), Max: 98.4 (01 May 2024 23:38)  HR: 73 (02 May 2024 06:18) (70 - 85)  BP: 129/61 (02 May 2024 06:18) (118/59 - 129/61)  BP(mean): 85 (01 May 2024 23:38) (85 - 85)  RR: 18 (02 May 2024 06:18) (18 - 19)  SpO2: 100% (02 May 2024 06:18) (97% - 100%)    Parameters below as of 02 May 2024 06:18  Patient On (Oxygen Delivery Method): nasal cannula  O2 Flow (L/min): 3      PHYSICAL EXAM:  GENERAL: NAD, well-groomed, well-developed  NERVOUS SYSTEM:  Alert & Oriented X3,   PULM: Clear to auscultation bilaterally, faint wheezing   CARDIAC: Regular rate and rhythm; No murmurs, rubs, or gallops  GI: Soft, Nontender, Nondistended; Bowel sounds present  EXTREMITIES: Lipedema, erythema bilateral worse in the left side     Consultant(s) Notes Reviewed:  [x ] YES  [ ] NO  Care Discussed with Consultants/Other Providers [ x] YES  [ ] NO    LABS:                            7.8    7.27  )-----------( 499      ( 01 May 2024 11:29 )             27.6   04-30    134<L>  |  95<L>  |  5<L>  ----------------------------<  92  4.5   |  27  |  <0.5<L>    Ca    9.0      30 Apr 2024 19:00  Mg     1.9     04-30    TPro  6.0  /  Alb  3.4<L>  /  TBili  <0.2  /  DBili  x   /  AST  19  /  ALT  10  /  AlkPhos  143<H>  04-30            acetaminophen     Tablet .. 650 milliGRAM(s) Oral every 6 hours PRN  albuterol    90 MICROgram(s) HFA Inhaler 2 Puff(s) Inhalation every 6 hours PRN  ARIPiprazole 5 milliGRAM(s) Oral at bedtime  doxycycline monohydrate Capsule 100 milliGRAM(s) Oral every 12 hours  ferrous    sulfate 325 milliGRAM(s) Oral daily  loratadine 10 milliGRAM(s) Oral daily  pantoprazole  Injectable 40 milliGRAM(s) IV Push two times a day  trihexyphenidyl 2 milliGRAM(s) Oral two times a day        68 years old female with PMH of  htn, hld, bipolar disorder, copd on 2L home O2, chf, chronic lipedema, asthma presents to ED with c/o shortness of breath and le redness and swelling.      1. Shortness of breath secondary to Acute CHF (orthopnea and no wheezing) tirggered by new worsening microcytic anemia . hx of COPD   * on 2L home O2, but now refusing to use  oxygen at hospital (satting 92%)  * pt reported that she had bleeding from PUD but not clear if any intervention was done. Need to get records from [facility]   - VBG:  pH: 7.31, PCO2: 69, HCO3: 35. might be obesity hypoventilation   - Admit to medicine                 - ECG :TWI V1, V2                     - CXR:  Interstitial infiltrate. BNP not that elevated but pt is obese            -LUNA: negative   - Cont  lasix 40mg IV bid . Might add spironolactone if K<5   - PPI IV bid   - Trop negative *2   - Oxygen protocol now on 3 liter   - Start venofer 200mg IV d:1 (Should be safe 24 hour after abs coverage)   - GI consult appreciated   *  s/p aztreonem, flagyl, solumedrol and nebs in ED for COPD exacerbation     2.Possible  Lower extremity cellulitis Left > Right in a pt with lipedema + stasis dermatitis   * Pt with baseline lipedema cuff sign  noted   - CRP:pending  - Cont doxycycline d:2   - Leonidas the erythema area  - Keep left leg elevated  - venous duplex:WNL   - Steroid  cream bid   - PT eval due to fall risk:pending     3.Hypercapnea likely due to obesity hypoventilation syndrome   - Repeat ABG:pending   - Need pulm follow up as outpatient    4. DVT/GI px.

## 2024-05-02 NOTE — PATIENT PROFILE ADULT - PACKS YRS CALCULATION
· COVID-19 infection with acute respiratory failure/hypoxia  · COVID-19:  Continue remdesivir and atorvastatin , dexamethasone  · trend inflammatory markers  · Is requiring 5 L nasal cannula  · Discuss with family convalescent plasma and code status     Lab Results   Component Value Date    SARSCOV2 Positive (A) 12/18/2020    SARSCOV2 Not Detected 07/17/2020    SARSCOV2 Negative 07/13/2020     Results from last 7 days   Lab Units 12/23/20  0631 12/22/20  0446 12/21/20  0530 12/18/20  2230 12/18/20  2044   D-DIMER QUANTITATIVE ug/ml FEU 0 87* 0 86* 1 04*  --  1 47*   CRP mg/L 136 8* 147 8* 182 7* 200 0*  --    FERRITIN ng/mL  --  451* 541* 413*  --      Results from last 7 days   Lab Units 12/19/20  0639 12/18/20  2044   PROCALCITONIN ng/ml 0 11 0 16 4

## 2024-05-03 LAB
ALBUMIN SERPL ELPH-MCNC: 3.8 G/DL — SIGNIFICANT CHANGE UP (ref 3.5–5.2)
ALP SERPL-CCNC: 150 U/L — HIGH (ref 30–115)
ALT FLD-CCNC: 10 U/L — SIGNIFICANT CHANGE UP (ref 0–41)
ANION GAP SERPL CALC-SCNC: 13 MMOL/L — SIGNIFICANT CHANGE UP (ref 7–14)
AST SERPL-CCNC: 15 U/L — SIGNIFICANT CHANGE UP (ref 0–41)
BASOPHILS # BLD AUTO: 0.03 K/UL — SIGNIFICANT CHANGE UP (ref 0–0.2)
BASOPHILS NFR BLD AUTO: 0.4 % — SIGNIFICANT CHANGE UP (ref 0–1)
BILIRUB SERPL-MCNC: 0.2 MG/DL — SIGNIFICANT CHANGE UP (ref 0.2–1.2)
BUN SERPL-MCNC: 9 MG/DL — LOW (ref 10–20)
CALCIUM SERPL-MCNC: 9.7 MG/DL — SIGNIFICANT CHANGE UP (ref 8.4–10.5)
CHLORIDE SERPL-SCNC: 90 MMOL/L — LOW (ref 98–110)
CO2 SERPL-SCNC: 31 MMOL/L — SIGNIFICANT CHANGE UP (ref 17–32)
CREAT SERPL-MCNC: 0.5 MG/DL — LOW (ref 0.7–1.5)
EGFR: 102 ML/MIN/1.73M2 — SIGNIFICANT CHANGE UP
EOSINOPHIL # BLD AUTO: 0.15 K/UL — SIGNIFICANT CHANGE UP (ref 0–0.7)
EOSINOPHIL NFR BLD AUTO: 2.1 % — SIGNIFICANT CHANGE UP (ref 0–8)
GLUCOSE SERPL-MCNC: 80 MG/DL — SIGNIFICANT CHANGE UP (ref 70–99)
HCT VFR BLD CALC: 33.4 % — LOW (ref 37–47)
HGB BLD-MCNC: 9.4 G/DL — LOW (ref 12–16)
IMM GRANULOCYTES NFR BLD AUTO: 0.7 % — HIGH (ref 0.1–0.3)
LYMPHOCYTES # BLD AUTO: 1.15 K/UL — LOW (ref 1.2–3.4)
LYMPHOCYTES # BLD AUTO: 16.2 % — LOW (ref 20.5–51.1)
MAGNESIUM SERPL-MCNC: 2.1 MG/DL — SIGNIFICANT CHANGE UP (ref 1.8–2.4)
MCHC RBC-ENTMCNC: 19.8 PG — LOW (ref 27–31)
MCHC RBC-ENTMCNC: 28.1 G/DL — LOW (ref 32–37)
MCV RBC AUTO: 70.5 FL — LOW (ref 81–99)
MONOCYTES # BLD AUTO: 0.57 K/UL — SIGNIFICANT CHANGE UP (ref 0.1–0.6)
MONOCYTES NFR BLD AUTO: 8.1 % — SIGNIFICANT CHANGE UP (ref 1.7–9.3)
NEUTROPHILS # BLD AUTO: 5.13 K/UL — SIGNIFICANT CHANGE UP (ref 1.4–6.5)
NEUTROPHILS NFR BLD AUTO: 72.5 % — SIGNIFICANT CHANGE UP (ref 42.2–75.2)
NRBC # BLD: 0 /100 WBCS — SIGNIFICANT CHANGE UP (ref 0–0)
PLATELET # BLD AUTO: 547 K/UL — HIGH (ref 130–400)
PMV BLD: 8.4 FL — SIGNIFICANT CHANGE UP (ref 7.4–10.4)
POTASSIUM SERPL-MCNC: 4.6 MMOL/L — SIGNIFICANT CHANGE UP (ref 3.5–5)
POTASSIUM SERPL-SCNC: 4.6 MMOL/L — SIGNIFICANT CHANGE UP (ref 3.5–5)
PROT SERPL-MCNC: 7 G/DL — SIGNIFICANT CHANGE UP (ref 6–8)
RBC # BLD: 4.74 M/UL — SIGNIFICANT CHANGE UP (ref 4.2–5.4)
RBC # FLD: 21.7 % — HIGH (ref 11.5–14.5)
SODIUM SERPL-SCNC: 134 MMOL/L — LOW (ref 135–146)
WBC # BLD: 7.08 K/UL — SIGNIFICANT CHANGE UP (ref 4.8–10.8)
WBC # FLD AUTO: 7.08 K/UL — SIGNIFICANT CHANGE UP (ref 4.8–10.8)

## 2024-05-03 PROCEDURE — 99232 SBSQ HOSP IP/OBS MODERATE 35: CPT

## 2024-05-03 PROCEDURE — 99233 SBSQ HOSP IP/OBS HIGH 50: CPT

## 2024-05-03 PROCEDURE — 99223 1ST HOSP IP/OBS HIGH 75: CPT | Mod: GC

## 2024-05-03 PROCEDURE — 74177 CT ABD & PELVIS W/CONTRAST: CPT | Mod: 26

## 2024-05-03 PROCEDURE — 71045 X-RAY EXAM CHEST 1 VIEW: CPT | Mod: 26

## 2024-05-03 RX ORDER — IOHEXOL 300 MG/ML
30 INJECTION, SOLUTION INTRAVENOUS ONCE
Refills: 0 | Status: COMPLETED | OUTPATIENT
Start: 2024-05-03 | End: 2024-05-03

## 2024-05-03 RX ADMIN — Medication 3 MILLILITER(S): at 07:31

## 2024-05-03 RX ADMIN — Medication 2 MILLIGRAM(S): at 17:30

## 2024-05-03 RX ADMIN — Medication 2 MILLIGRAM(S): at 05:25

## 2024-05-03 RX ADMIN — Medication 100 MILLIGRAM(S): at 05:25

## 2024-05-03 RX ADMIN — LORATADINE 10 MILLIGRAM(S): 10 TABLET ORAL at 12:32

## 2024-05-03 RX ADMIN — Medication 3 MILLILITER(S): at 19:42

## 2024-05-03 RX ADMIN — PANTOPRAZOLE SODIUM 40 MILLIGRAM(S): 20 TABLET, DELAYED RELEASE ORAL at 17:30

## 2024-05-03 RX ADMIN — PANTOPRAZOLE SODIUM 40 MILLIGRAM(S): 20 TABLET, DELAYED RELEASE ORAL at 05:25

## 2024-05-03 RX ADMIN — Medication 3 MILLILITER(S): at 13:11

## 2024-05-03 RX ADMIN — IRON SUCROSE 110 MILLIGRAM(S): 20 INJECTION, SOLUTION INTRAVENOUS at 12:32

## 2024-05-03 RX ADMIN — Medication 100 MILLIGRAM(S): at 17:30

## 2024-05-03 RX ADMIN — Medication 1 APPLICATION(S): at 17:44

## 2024-05-03 RX ADMIN — IOHEXOL 30 MILLILITER(S): 300 INJECTION, SOLUTION INTRAVENOUS at 16:41

## 2024-05-03 RX ADMIN — ARIPIPRAZOLE 5 MILLIGRAM(S): 15 TABLET ORAL at 21:42

## 2024-05-03 RX ADMIN — BUDESONIDE AND FORMOTEROL FUMARATE DIHYDRATE 2 PUFF(S): 160; 4.5 AEROSOL RESPIRATORY (INHALATION) at 08:35

## 2024-05-03 RX ADMIN — Medication 1 APPLICATION(S): at 05:25

## 2024-05-03 RX ADMIN — Medication 40 MILLIGRAM(S): at 05:25

## 2024-05-03 NOTE — PROGRESS NOTE ADULT - SUBJECTIVE AND OBJECTIVE BOX
24H events:    Patient is a 68y old Female who presents with a chief complaint of shortness of breath (03 May 2024 11:05)    Primary diagnosis of COPD exacerbation    Today is hospital day 3d. This morning patient was seen and examined at bedside, resting comfortably in bed.    No acute or major events overnight.    Code Status:    Family communication:  Contact date:  Name of person contacted:  Relationship to patient:  Communication details:  What matters most:    PAST MEDICAL & SURGICAL HISTORY  COPD (chronic obstructive pulmonary disease)    Asthma    Lymphedema    Bipolar disorder    CHF (congestive heart failure)    History of hernia repair    History of D&C    H/O  section      SOCIAL HISTORY:  Social History:      ALLERGIES:  penicillin (Short breath)  eggs (Short breath)    MEDICATIONS:  STANDING MEDICATIONS  albuterol/ipratropium for Nebulization 3 milliLiter(s) Nebulizer every 6 hours  ARIPiprazole 5 milliGRAM(s) Oral at bedtime  budesonide 160 MICROgram(s)/formoterol 4.5 MICROgram(s) Inhaler 2 Puff(s) Inhalation two times a day  doxycycline monohydrate Capsule 100 milliGRAM(s) Oral every 12 hours  iron sucrose IVPB 200 milliGRAM(s) IV Intermittent every 24 hours  loratadine 10 milliGRAM(s) Oral daily  pantoprazole  Injectable 40 milliGRAM(s) IV Push two times a day  triamcinolone 0.1% Cream 1 Application(s) Topical every 12 hours  trihexyphenidyl 2 milliGRAM(s) Oral two times a day    PRN MEDICATIONS  acetaminophen     Tablet .. 650 milliGRAM(s) Oral every 6 hours PRN  albuterol    90 MICROgram(s) HFA Inhaler 2 Puff(s) Inhalation every 6 hours PRN    VITALS:   T(F): 97.8  HR: 76  BP: 126/67  RR: 19  SpO2: 94%    PHYSICAL EXAM:      GENERAL: NAD, well-groomed, well-developed  NERVOUS SYSTEM:  Alert & Oriented X3,   PULM: Clear to auscultation bilaterally, faint wheezing   CARDIAC: Regular rate and rhythm; No murmurs, rubs, or gallops  GI: Soft, Nontender, Nondistended; Bowel sounds present  EXTREMITIES: Lipedema, erythema bilateral worse in the left side         (  ) Indwelling Segundo Catheter:   Date insterted:    Reason (  ) Critical illness     (  ) urinary retention    (  ) Accurate Ins/Outs Monitoring     (  ) CMO patient    (  ) Central Line:   Date inserted:  Location: (  ) Right IJ     (  ) Left IJ     (  ) Right Fem     (  ) Left Fem    (  ) SPC        (  ) pigtail       (  ) PEG tube       (  ) colostomy       (  ) jejunostomy  (  ) U-Dall    LABS:                        9.4    7.08  )-----------( 547      ( 03 May 2024 07:48 )             33.4     05-03    134<L>  |  90<L>  |  9<L>  ----------------------------<  80  4.6   |  31  |  0.5<L>    Ca    9.7      03 May 2024 07:48  Mg     2.1     05-03    TPro  7.0  /  Alb  3.8  /  TBili  0.2  /  DBili  x   /  AST  15  /  ALT  10  /  AlkPhos  150<H>  05-03      Urinalysis Basic - ( 03 May 2024 07:48 )    Color: x / Appearance: x / SG: x / pH: x  Gluc: 80 mg/dL / Ketone: x  / Bili: x / Urobili: x   Blood: x / Protein: x / Nitrite: x   Leuk Esterase: x / RBC: x / WBC x   Sq Epi: x / Non Sq Epi: x / Bacteria: x                RADIOLOGY:

## 2024-05-03 NOTE — CONSULT NOTE ADULT - SUBJECTIVE AND OBJECTIVE BOX
Patient is a 68y old  Female who presents with a chief complaint of shortness of breath (03 May 2024 08:31)      HPI:  68 years old female with PMH of  htn, hdl, bipolar disorder, copd on 2L home O2, chf, chronic lymphedema, asthma presents to ED with c/o shortness of breath and le redness and swelling. Patient noticed b/l le redness that worsened x1 week. Patient also reports shortness of breath from  several days. Pt denies f/c, chest pain, hemoptysis, vomiting, dark/bloody stool. no ac use.  Patient refused to provide with further details saying she wants to sleep, only allowed me to do brief physical exam, she is not wheezing on auscultation, has b/l lower extremity edema with redness (concerning for venous stasis).    In ED   Vital Signs Last 24 Hrs)  T(F): 98  HR: 76   BP: 134/63   RR: 18   SpO2: 92%  on RA    Labs significant for Hb: 7.6 (15.9 in 2023),   VBG:  pH: 7.31, PCO2: 69, HCO3: 35  On Exam: chest b/l clear not wheezing  b/l lower extremity edema with redness   Patient received antibiotics (aztreonem and flagyl), nebs, solu mederol in ED    Patient being admitted to medicine for sob and questionable cellulitis.  (01 May 2024 00:46)      PAST MEDICAL & SURGICAL HISTORY:  COPD (chronic obstructive pulmonary disease)      Asthma      Lymphedema      Bipolar disorder      CHF (congestive heart failure)      History of hernia repair      History of D&C      H/O  section          SOCIAL HX:   Smoking                         ETOH                            Other    FAMILY HISTORY:  FH: leukemia (Sibling)    .  No cardiovascular or pulmonary family history     REVIEW OF SYSTEMS:    All ROS are negative exept per HPI       Allergies    penicillin (Short breath)  eggs (Short breath)    Intolerances          PHYSICAL EXAM  Vital Signs Last 24 Hrs  T(C): 36.6 (03 May 2024 05:21), Max: 36.6 (02 May 2024 14:05)  T(F): 97.8 (03 May 2024 05:21), Max: 97.9 (02 May 2024 14:05)  HR: 76 (03 May 2024 05:21) (74 - 76)  BP: 126/67 (03 May 2024 05:21) (126/67 - 147/74)  BP(mean): --  RR: 19 (03 May 2024 05:21) (18 - 19)  SpO2: 94% (03 May 2024 08:40) (94% - 100%)    Parameters below as of 03 May 2024 08:40  Patient On (Oxygen Delivery Method): room air        CONSTITUTIONAL:  in NAD    ENT:   Airway patent,   No thrush    EYES:   Clear bilaterally,   pupils equal,   round and reactive to light.    CARDIAC:   Normal rate,   regular rhythm.    LE edema      RESPIRATORY:   Decreased bilateral BS   Normal chest expansion  Not tachypneic,  No use of accessory muscles    GASTROINTESTINAL:  Abdomen soft, non-tender,   No guarding, central hernia   Positive BS    MUSCULOSKELETAL:   Range of motion is not limited,  No clubbing, cyanosis    NEUROLOGICAL:   Alert and oriented   No motor deficits.    SKIN:   LE lymphedema           LABS:                          9.4    7.08  )-----------( 547      ( 03 May 2024 07:48 )             33.4                                               05-03    134<L>  |  90<L>  |  9<L>  ----------------------------<  80  4.6   |  31  |  0.5<L>    Ca    9.7      03 May 2024 07:48  Mg     2.1     05-03    TPro  7.0  /  Alb  3.8  /  TBili  0.2  /  DBili  x   /  AST  15  /  ALT  10  /  AlkPhos  150<H>  05-03                                             Urinalysis Basic - ( 03 May 2024 07:48 )    Color: x / Appearance: x / SG: x / pH: x  Gluc: 80 mg/dL / Ketone: x  / Bili: x / Urobili: x   Blood: x / Protein: x / Nitrite: x   Leuk Esterase: x / RBC: x / WBC x   Sq Epi: x / Non Sq Epi: x / Bacteria: x                                                  LIVER FUNCTIONS - ( 03 May 2024 07:48 )  Alb: 3.8 g/dL / Pro: 7.0 g/dL / ALK PHOS: 150 U/L / ALT: 10 U/L / AST: 15 U/L / GGT: x                                                                                                MEDICATIONS  (STANDING):  albuterol/ipratropium for Nebulization 3 milliLiter(s) Nebulizer every 6 hours  ARIPiprazole 5 milliGRAM(s) Oral at bedtime  budesonide 160 MICROgram(s)/formoterol 4.5 MICROgram(s) Inhaler 2 Puff(s) Inhalation two times a day  doxycycline monohydrate Capsule 100 milliGRAM(s) Oral every 12 hours  iron sucrose IVPB 200 milliGRAM(s) IV Intermittent every 24 hours  loratadine 10 milliGRAM(s) Oral daily  pantoprazole  Injectable 40 milliGRAM(s) IV Push two times a day  triamcinolone 0.1% Cream 1 Application(s) Topical every 12 hours  trihexyphenidyl 2 milliGRAM(s) Oral two times a day    MEDICATIONS  (PRN):  acetaminophen     Tablet .. 650 milliGRAM(s) Oral every 6 hours PRN Temp greater or equal to 38C (100.4F), Mild Pain (1 - 3), Moderate Pain (4 - 6), Severe Pain (7 - 10)  albuterol    90 MICROgram(s) HFA Inhaler 2 Puff(s) Inhalation every 6 hours PRN for bronchospasm      X-Rays reviewed:

## 2024-05-03 NOTE — CONSULT NOTE ADULT - CONSULT REASON
copd, suspected enrique - reported 2L o2 at home, no hypoxemia on RA, consult for CPAP at night
anemia

## 2024-05-03 NOTE — CONSULT NOTE ADULT - ASSESSMENT
Impression:     Chronic hypercapnic respiratory failure   COPD on 2L home O2   Likely LATISHA/OHS   Bipolar disorder   Chronic lymphedema   Abdominal hernia   Active smoker     Recommendations:     Patient used to see Dr. Iglesias but states she no longer wants to see her [facility] physicians.   Wean O2 as tolerated, target SpO2 88-92%  VBG noted with chronic hypercapnia   Nebs Q6 PRN  May benefit from AVAPS QHS and PRN at the following settings:   TV: 385, Epap 6, Imin 10, Imax 20, RR 14, FiO2 35%  Willing to try NIV  OP pulmonary follow up for PFTs and LDCT screening   Smoking cessation   Poor prognosis      Impression:     Chronic hypercapnic respiratory failure   COPD on 2L home O2   Likely LATISHA/OHS   Bipolar disorder   Chronic lymphedema   Abdominal hernia   Active smoker     Recommendations:     Patient used to see Dr. Iglesias but states she no longer wants to see her [facility] physicians.   Wean O2 as tolerated, target SpO2 88-92%  VBG noted with chronic hypercapnia   Nebs Q6 PRN  Start stiolto. Can DC on stiolto OR any LABA/LAMA covered by insurance.   May benefit from AVAPS QHS and PRN at the following settings:   TV: 385, Epap 6, Imin 10, Imax 20, RR 14, FiO2 35%  Willing to try NIV  OP pulmonary follow up at [facility] clinic for PFTs and LDCT screening   Smoking cessation   Poor prognosis

## 2024-05-03 NOTE — PROGRESS NOTE ADULT - SUBJECTIVE AND OBJECTIVE BOX
pt seen and examined.     My notes supersede resident's notes in case of discrepancy       ROS: no cp, no sob, no n/v, no fever    Vital Signs Last 24 Hrs  T(C): 36.6 (03 May 2024 05:21), Max: 36.6 (02 May 2024 14:05)  T(F): 97.8 (03 May 2024 05:21), Max: 97.9 (02 May 2024 14:05)  HR: 76 (03 May 2024 05:21) (74 - 76)  BP: 126/67 (03 May 2024 05:21) (126/67 - 147/74)  BP(mean): --  RR: 19 (03 May 2024 05:21) (18 - 19)  SpO2: 94% (03 May 2024 08:40) (94% - 100%)    Parameters below as of 03 May 2024 08:40  Patient On (Oxygen Delivery Method): room air     physical exam  constitutional NAD, AAOX3, Respiratory  lungs CTA, CVS heart RRR, GI: abdomen Soft NT, ND, BS+, skin: bilat leg swelling and chronic skin changes, right>left, below knee, mid calf, right side is also tender and red , per pt it has improved   neuro exam no focal deficit     MEDICATIONS  (STANDING):  albuterol/ipratropium for Nebulization 3 milliLiter(s) Nebulizer every 6 hours  ARIPiprazole 5 milliGRAM(s) Oral at bedtime  budesonide 160 MICROgram(s)/formoterol 4.5 MICROgram(s) Inhaler 2 Puff(s) Inhalation two times a day  doxycycline monohydrate Capsule 100 milliGRAM(s) Oral every 12 hours  iron sucrose IVPB 200 milliGRAM(s) IV Intermittent every 24 hours  loratadine 10 milliGRAM(s) Oral daily  pantoprazole  Injectable 40 milliGRAM(s) IV Push two times a day  triamcinolone 0.1% Cream 1 Application(s) Topical every 12 hours  trihexyphenidyl 2 milliGRAM(s) Oral two times a day    MEDICATIONS  (PRN):  acetaminophen     Tablet .. 650 milliGRAM(s) Oral every 6 hours PRN Temp greater or equal to 38C (100.4F), Mild Pain (1 - 3), Moderate Pain (4 - 6), Severe Pain (7 - 10)  albuterol    90 MICROgram(s) HFA Inhaler 2 Puff(s) Inhalation every 6 hours PRN for bronchospasm                        9.4    7.08  )-----------( 547      ( 03 May 2024 07:48 )             33.4     05-03    134<L>  |  90<L>  |  9<L>  ----------------------------<  80  4.6   |  31  |  0.5<L>    Ca    9.7      03 May 2024 07:48  Mg     2.1     05-03    TPro  7.0  /  Alb  3.8  /  TBili  0.2  /  DBili  x   /  AST  15  /  ALT  10  /  AlkPhos  150<H>  05-03    Ferritin: 10 ng/mL [13 - 330] (05-01-24 @ 11:29)    Respiratory Viral Panel with COVID-19 by JOSIAH (05.01.24 @ 20:54)    Rapid RVP Result: [facility]   SARS-CoV-2: [facility]: This Respiratory Panel uses polymerase chain reaction (PCR) to detect for  adenovirus; coronavirus (HKU1, NL63, 229E, OC43); human metapneumovirus  (hMPV); human enterovirus/rhinovirus (Entero/RV); influenza A; influenza  A/H1; influenza A/H3; influenza A/H1-2009; influenza B; parainfluenza  viruses 1, 2, 3, 4; respiratory syncytial virus; Mycoplasma pneumoniae;  Chlamydophila pneumoniae; and SARS-CoV-2.  This test was validated by [facility] and is in use under the FDA  Emergency Use Authorization (EUA) for clinical labs CLIA-certified to  perform high complexity testing. Test results should be correlated with  clinical presentation, patient history, and epidemiology.    Pro-Brain Natriuretic Peptide: 383 pg/mL (04.30.24 @ 19:00)  < from: Xray Chest 1 View- PORTABLE-Urgent (Xray Chest 1 View- PORTABLE-Urgent .) (05.03.24 @ 09:17) >  No radiographic evidence of acute cardiopulmonary disease.    < end of copied text >    < from: VA Duplex Lower Ext Vein Scan, Bilat (05.01.24 @ 14:31) >  No evidence of deep venous thrombosis in either lower extremity. Peroneal   veins not visualized bilaterally.    < end of copied text >        a/p    68 years old female with PMH of  htn, hdl, bipolar disorder, copd on 2L home O2, chf, chronic lymphedema, asthma presents to ED with c/o shortness of breath and le redness and swelling. Patient noticed b/l le redness that worsened x1 week. Patient also reports shortness of breath from  several days. Pt denies f/c, chest pain, hemoptysis, vomiting, dark/bloody stool. no ac use.  Patient refused to provide with further details saying she wants to sleep, only allowed me to do brief physical exam, she is not wheezing on auscultation, has b/l lower extremity edema with redness (concerning for venous stasis).    # leg edema, redness, tenderness  no sepsis   cellulitis,   cont abx  clinically improving    # sob, multifactorial, copd, ( not hypoxic in this admission, O2 94% on room air) hypercapnic, also exacerbated by anemia   pulm notes appreciated    Nebs Q6 PRN  May benefit from AVAPS QHS and PRN at the following settings:   TV: 385, Epap 6, Imin 10, Imax 20, RR 14, FiO2 35%    fu echo rule out chf     # anemia   no acute gross bleeding  recent gib and sp egd in [facility]   fu gi ,   may need repeat egd in this admission     # HTN , HLD, bipolar cont meds     #Progress Note Handoff    Pending :  clinical improvement   Family discussion: jennifer pt   Disposition: home   code status: full code

## 2024-05-03 NOTE — PROGRESS NOTE ADULT - ASSESSMENT
68 years old female with PMH of  htn, hld, bipolar disorder, copd on 2L home O2, chf, chronic lipedema, asthma presents to ED with c/o shortness of breath and le redness and swelling.    ##Shortness of breath secondary to Acute CHF (orthopnea and no wheezing) tirggered by new worsening microcytic anemia   #hx of COPD   * on 2L home O2, but now refusing to use  oxygen at hospital (satting 92%)  * pt reported that she had bleeding from PUD but not clear if any intervention was done. Need to get records from [facility] .   - Patient was recently admitted to [facility] for hematemesis.  Records from [facility] reviewed. EGD 5/2/24 report: bleeding duodenal ulcer in second portion of duodenum, s/p clipping.  - VBG:  pH: 7.31, PCO2: 69, HCO3: 35. might be obesity hypoventilation   -   - ECG :TWI V1, V2                     - CXR:  Interstitial infiltrate. BNP not that elevated but pt is obese            -LUNA: negative   - Cont  lasix 40mg IV bid . Might add spironolactone if K<5   - PPI IV bid   - Trop negative *2   - Oxygen protocol now on 3 liter   - Start venofer 200mg IV d:1 (Should be safe 24 hour after abs coverage)   - GI consult appreciated   - s/p aztreonem, flagyl, solumedrol and nebs in ED for COPD exacerbation     ##Possible  Lower extremity cellulitis Left > Right in a pt with lipedema + stasis dermatitis   * Pt with baseline lipedema cuff sign  noted   - Cont doxycycline d:2   - Leonidas the erythema area  - Keep left leg elevated  - venous duplex:WNL   - Steroid  cream bid       #Hypercapnia likely due to obesity hypoventilation syndrome   - Repeat ABG:   - Pulmonology following, recommends Bipap during sleep     DVT prophylaxis: LOVENOX   Activity:  Diet: DASH  Dispo:  Code: FULL     68 years old female with PMH of  htn, hld, bipolar disorder, copd on 2L home O2, chf, chronic lipedema, asthma presents to ED with c/o shortness of breath and le redness and swelling.    ##Shortness of breath secondary to Acute CHF (orthopnea and no wheezing) tirggered by new worsening microcytic anemia   #hx of COPD   * on 2L home O2, but now refusing to use  oxygen at hospital (satting 92%)  * pt reported that she had bleeding from PUD but not clear if any intervention was done. Need to get records from [facility] .   - Patient was recently admitted to [facility] for hematemesis.  Records from [facility] reviewed. EGD 5/2/24 report: bleeding duodenal ulcer in second portion of duodenum, s/p clipping.  - VBG:  pH: 7.31, PCO2: 69, HCO3: 35. might be obesity hypoventilation   -   - ECG :TWI V1, V2                     - CXR:  Interstitial infiltrate. BNP not that elevated but pt is obese            -LUNA: negative   - Cont  lasix 40mg IV bid . Might add spironolactone if K<5   - PPI IV bid   - Trop negative *2   - Oxygen protocol now on 3 liter   - Start venofer 200mg IV d:1 (Should be safe 24 hour after abs coverage)   - GI consult appreciated   - s/p aztreonem, flagyl, solumedrol and nebs in ED for COPD exacerbation     ##Possible  Lower extremity cellulitis Left > Right in a pt with lipedema + stasis dermatitis   * Pt with baseline lipedema cuff sign  noted   - Cont doxycycline d:2   - Leonidas the erythema area  - Keep left leg elevated  - venous duplex:WNL   - Steroid  cream bid       #Hypercapnia likely due to obesity hypoventilation syndrome   - Repeat ABG:   - Pulmonology following, recommends Bipap during sleep     #Abdomen hernia  - Patient complains of pain around ventral abdomen hernia  - o/e: local abdomen tenderness  - fu CT abdomen       DVT prophylaxis: LOVENOX   Activity:  Diet: DASH  Dispo:  Code: FULL

## 2024-05-03 NOTE — PROGRESS NOTE ADULT - ASSESSMENT
68 years old female with PMH of  htn, hdl, bipolar disorder, copd on 2L home O2, chf, chronic lymphedema, asthma, sp hernia repair presents to ED with c/o shortness of breath and LE erythema and swelling x2 weeks. GI consulted for acute on chronic anemia.     #Acute microcytic anemia with NARCISA - no overt GI bleed  - Hemodynamically stable & no active bleeding  - Baseline hemoglobin - in 11/23: 15  - Hemoglobin on admission - 7.6, MCV 70.9 >9  - iron: 19, %sat: 6, TIBC: 332  - patient is on PO iron therapy  - not on AC  - patient reports PUD s/p EGD at [facility] 2-3 weeks ago  - patient frustrated on exam ; didnt want to answer many questions, refused LUNA  - dyspneic on exam 5/2.5/3  - 5/3: Hb : 9.4, having brown BMs      #Rec  - please obtain EGD records from [facility]   - recommend abdominal imaging given pain around hernia site with surgery follow up  - c/w pantoprazole 40 PO BID  - will recommend outpatient workup for colonoscopy pending surgery evaluation for hernia?  - Miralax daily  - Maintain active Type and screen  - c/w iron per primary team  - Please avoid any NSAIDs  - monitor BMs    Communicated with primary team     68 years old female with PMH of  htn, hdl, bipolar disorder, copd on 2L home O2, chf, chronic lymphedema, asthma, sp hernia repair presents to ED with c/o shortness of breath and LE erythema and swelling x2 weeks. GI consulted for acute on chronic anemia.     #Acute microcytic anemia with NARCISA - no overt GI bleed  - Hemodynamically stable & no active bleeding  - Baseline hemoglobin - in 11/23: 15  - Hemoglobin on admission - 7.6, MCV 70.9 >9  - iron: 19, %sat: 6, TIBC: 332  - patient is on PO iron therapy  - not on AC  - patient reports PUD s/p EGD at [facility] 2-3 weeks ago  - patient frustrated on exam ; didnt want to answer many questions, refused LUNA  - dyspneic on exam 5/2.5/3  - 5/3: Hb : 9.4, having brown BMs      #Rec  - please obtain EGD records from [facility]   - recommend abdominal imaging given pain around hernia site with surgery follow up  - c/w pantoprazole 40 PO BID  - will recommend outpatient workup for colonoscopy pending surgery evaluation for hernia?  - Miralax daily  - Maintain active Type and screen  - c/w iron per primary team  - Please avoid any NSAIDs  - monitor BMs    #Elevated ALP    RECS  - recommend GGT, ALP isoenzymes    Communicated with primary team     68 years old female with PMH of  htn, hdl, bipolar disorder, copd on 2L home O2, chf, chronic lymphedema, asthma, sp hernia repair presents to ED with c/o shortness of breath and LE erythema and swelling x2 weeks. GI consulted for acute on chronic anemia.     #Acute microcytic anemia with NARCISA - no overt GI bleed  - Hemodynamically stable & no active bleeding  - Baseline hemoglobin - in 11/23: 15  - Hemoglobin on admission - 7.6, MCV 70.9 >9  - iron: 19, %sat: 6, TIBC: 332  - patient is on PO iron therapy  - not on AC  - patient frustrated on exam ; didnt want to answer many questions, refused LUNA  - dyspneic on exam 5/2.5/3  - 5/3: Hb : 9.4, having brown BMs  - EGD 2/5/24 at [facility] by  for hematemesis, source could not be identified clearly , clip was placed in the duodenum for possible IR GDA embolization, patient does not remember any IR embolization done      #Rec  - recommend abdominal imaging given pain around hernia site with surgery follow up  - will recommend EGD prior to discharge given no identifiable source last time  - c/w pantoprazole 40 PO BID  - will recommend outpatient workup for colonoscopy pending surgery evaluation for hernia?  - Miralax daily  - Maintain active Type and screen  - c/w iron per primary team  - Please avoid any NSAIDs  - monitor BMs    #Elevated ALP    RECS  - recommend GGT, ALP isoenzymes  - trend LFTs    Communicated with primary team     68 years old female with PMH of  htn, hdl, bipolar disorder, copd on 2L home O2, chf, chronic lymphedema, asthma, sp hernia repair presents to ED with c/o shortness of breath and LE erythema and swelling x2 weeks. GI consulted for acute on chronic anemia.     #Acute microcytic anemia with NARCISA - no overt GI bleed  - Hemodynamically stable & no active bleeding  - Baseline hemoglobin - in 11/23: 15  - Hemoglobin on admission - 7.6, MCV 70.9 >9  - iron: 19, %sat: 6, TIBC: 332  - patient is on PO iron therapy  - not on AC  - patient frustrated on exam ; didnt want to answer many questions, refused LUNA  - dyspneic on exam 5/2.5/3  - 5/3: Hb : 9.4, having brown BMs  - EGD 2/5/24 at [facility] by  for hematemesis, source could not be identified clearly , clip was placed in the duodenum for possible IR GDA embolization, patient does not remember any IR embolization done      #Rec  - recommend abdominal imaging given pain around hernia site with surgery follow up  - will recommend EGD prior to discharge when clinically optimized given no identifiable bleeding source last time, suspect underlying duodenal ulcer  - c/w pantoprazole 40 PO BID  - will recommend outpatient workup for colonoscopy pending surgery evaluation for hernia  - Miralax daily  - Maintain active Type and screen  - c/w iron per primary team  - Please avoid any NSAIDs  - monitor BMs    #Elevated ALP    RECS  - recommend GGT, ALP isoenzymes  - trend LFTs    Communicated with primary team

## 2024-05-03 NOTE — CONSULT NOTE ADULT - ATTENDING COMMENTS
Ms. Neal was seen and examined at bedside today, patient has a history of COPD requiring home oxygen with which she is noncompliant, as well as active tobacco abuse.  Pulmonary has been consulted for possible benefit from nocturnal noninvasive ventilation for her chronic hypercapnia.  Due to evidence of chronic hypercapnia, recommend trial of AVAPS at the settings documented above and discharged on the same when she is ready for discharge.  Also recommend transitioning her inhaled regimen to LAMA/LABA, and coordinating follow-up at the [facility] clinic for further management of her COPD.  I agree with the fellow note, with the exceptions listed in my attestation above.  The remainder of impression and plan per fellow note.
69yo female presents with sob and LE swelling asked to evaluate for worsening anemia. No overt GI bleeding currently. Obtain records from [facility] to confirm findings from reported EGD. Continue PPI. Consider CT imaging and surgery follow up to evaluate ventral hernia. Pending course, if develops overt GI bleeding with drop in Hb would consider need for endoscopy otherwise could resume outpt follow up.

## 2024-05-03 NOTE — PROGRESS NOTE ADULT - SUBJECTIVE AND OBJECTIVE BOX
Gastroenterology progress note:     Patient is a 68y old  Female who presents with a chief complaint of shortness of breath (02 May 2024 07:52)       Admitted on: 24    We are following the patient for: anemia       Interval History: having brown BMs, s/p iV iron infusions    PAST MEDICAL & SURGICAL HISTORY:  COPD (chronic obstructive pulmonary disease)      Asthma      Lymphedema      Bipolar disorder      CHF (congestive heart failure)      History of hernia repair      History of D&C      H/O  section          MEDICATIONS  (STANDING):  albuterol/ipratropium for Nebulization 3 milliLiter(s) Nebulizer every 6 hours  ARIPiprazole 5 milliGRAM(s) Oral at bedtime  budesonide 160 MICROgram(s)/formoterol 4.5 MICROgram(s) Inhaler 2 Puff(s) Inhalation two times a day  doxycycline monohydrate Capsule 100 milliGRAM(s) Oral every 12 hours  iron sucrose IVPB 200 milliGRAM(s) IV Intermittent every 24 hours  loratadine 10 milliGRAM(s) Oral daily  pantoprazole  Injectable 40 milliGRAM(s) IV Push two times a day  triamcinolone 0.1% Cream 1 Application(s) Topical every 12 hours  trihexyphenidyl 2 milliGRAM(s) Oral two times a day    MEDICATIONS  (PRN):  acetaminophen     Tablet .. 650 milliGRAM(s) Oral every 6 hours PRN Temp greater or equal to 38C (100.4F), Mild Pain (1 - 3), Moderate Pain (4 - 6), Severe Pain (7 - 10)  albuterol    90 MICROgram(s) HFA Inhaler 2 Puff(s) Inhalation every 6 hours PRN for bronchospasm      Allergies  penicillin (Short breath)  eggs (Short breath)      Review of Systems:   Cardiovascular:  No Chest Pain, No Palpitations  Respiratory:  No Cough, No Dyspnea  Gastrointestinal:  As described in HPI  Skin:  No Skin Lesions, No Jaundice  Neuro:  No Syncope, No Dizziness    Physical Examination:  T(C): 36.6 (24 @ 05:21), Max: 36.6 (24 @ 14:05)  HR: 76 (24 @ 05:21) (74 - 76)  BP: 126/67 (24 @ 05:21) (126/67 - 147/74)  RR: 19 (24 @ 05:21) (18 - 19)  SpO2: 94% (24 @ 05:21) (94% - 100%)      24 @ 07:01  -  24 @ 07:00  --------------------------------------------------------  IN: 0 mL / OUT: 1450 mL / NET: -1450 mL        GENERAL: AAOx3,  HEAD:  Atraumatic, Normocephalic  EYES: conjunctiva and sclera clear  NECK: Supple, no JVD or thyromegaly  CHEST/LUNG: decreased BS  HEART: Regular rate and rhythm; normal S1, S2, No murmurs.  ABDOMEN: Soft, nontender, nondistended; Bowel sounds present  NEUROLOGY: No asterixis or tremor.   SKIN:LE edema with eerythema, no jaundice     Data:                        9.4    7.08  )-----------( 547      ( 03 May 2024 07:48 )             33.4     Hgb trend:  9.4  24 @ 07:48  7.9  24 @ 11:16  7.8  24 @ 11:29  7.6  24 @ 19:00        05    134<L>  |  96<L>  |  13  ----------------------------<  91  4.8   |  31  |  0.5<L>    Ca    9.1      02 May 2024 11:16  Mg     1.7         TPro  6.1  /  Alb  3.4<L>  /  TBili  <0.2  /  DBili  x   /  AST  10  /  ALT  8   /  AlkPhos  131<H>      Liver panel trend:  TBili <0.2   /   AST 10   /   ALT 8   /   AlkP 131   /   Tptn 6.1   /   Alb 3.4    /   DBili --        TBili <0.2   /   AST 19   /   ALT 10   /   AlkP 143   /   Tptn 6.0   /   Alb 3.4    /   DBili --

## 2024-05-04 LAB
ALBUMIN SERPL ELPH-MCNC: 3.6 G/DL — SIGNIFICANT CHANGE UP (ref 3.5–5.2)
ALP SERPL-CCNC: 137 U/L — HIGH (ref 30–115)
ALT FLD-CCNC: 9 U/L — SIGNIFICANT CHANGE UP (ref 0–41)
ANION GAP SERPL CALC-SCNC: 6 MMOL/L — LOW (ref 7–14)
AST SERPL-CCNC: 13 U/L — SIGNIFICANT CHANGE UP (ref 0–41)
BASOPHILS # BLD AUTO: 0.05 K/UL — SIGNIFICANT CHANGE UP (ref 0–0.2)
BASOPHILS NFR BLD AUTO: 0.6 % — SIGNIFICANT CHANGE UP (ref 0–1)
BILIRUB SERPL-MCNC: <0.2 MG/DL — SIGNIFICANT CHANGE UP (ref 0.2–1.2)
BUN SERPL-MCNC: 8 MG/DL — LOW (ref 10–20)
CALCIUM SERPL-MCNC: 9.8 MG/DL — SIGNIFICANT CHANGE UP (ref 8.4–10.5)
CHLORIDE SERPL-SCNC: 92 MMOL/L — LOW (ref 98–110)
CO2 SERPL-SCNC: 35 MMOL/L — HIGH (ref 17–32)
CREAT SERPL-MCNC: <0.5 MG/DL — LOW (ref 0.7–1.5)
EGFR: 108 ML/MIN/1.73M2 — SIGNIFICANT CHANGE UP
EOSINOPHIL # BLD AUTO: 0.35 K/UL — SIGNIFICANT CHANGE UP (ref 0–0.7)
EOSINOPHIL NFR BLD AUTO: 4.5 % — SIGNIFICANT CHANGE UP (ref 0–8)
GGT SERPL-CCNC: 52 U/L — HIGH (ref 1–40)
GLUCOSE SERPL-MCNC: 87 MG/DL — SIGNIFICANT CHANGE UP (ref 70–99)
HCT VFR BLD CALC: 31 % — LOW (ref 37–47)
HGB BLD-MCNC: 8.6 G/DL — LOW (ref 12–16)
IMM GRANULOCYTES NFR BLD AUTO: 1.4 % — HIGH (ref 0.1–0.3)
LYMPHOCYTES # BLD AUTO: 1.75 K/UL — SIGNIFICANT CHANGE UP (ref 1.2–3.4)
LYMPHOCYTES # BLD AUTO: 22.5 % — SIGNIFICANT CHANGE UP (ref 20.5–51.1)
MAGNESIUM SERPL-MCNC: 2 MG/DL — SIGNIFICANT CHANGE UP (ref 1.8–2.4)
MCHC RBC-ENTMCNC: 19.6 PG — LOW (ref 27–31)
MCHC RBC-ENTMCNC: 27.7 G/DL — LOW (ref 32–37)
MCV RBC AUTO: 70.6 FL — LOW (ref 81–99)
MONOCYTES # BLD AUTO: 0.76 K/UL — HIGH (ref 0.1–0.6)
MONOCYTES NFR BLD AUTO: 9.8 % — HIGH (ref 1.7–9.3)
NEUTROPHILS # BLD AUTO: 4.75 K/UL — SIGNIFICANT CHANGE UP (ref 1.4–6.5)
NEUTROPHILS NFR BLD AUTO: 61.2 % — SIGNIFICANT CHANGE UP (ref 42.2–75.2)
NRBC # BLD: 0 /100 WBCS — SIGNIFICANT CHANGE UP (ref 0–0)
PLATELET # BLD AUTO: 488 K/UL — HIGH (ref 130–400)
PMV BLD: 8.5 FL — SIGNIFICANT CHANGE UP (ref 7.4–10.4)
POTASSIUM SERPL-MCNC: 4.7 MMOL/L — SIGNIFICANT CHANGE UP (ref 3.5–5)
POTASSIUM SERPL-SCNC: 4.7 MMOL/L — SIGNIFICANT CHANGE UP (ref 3.5–5)
PROT SERPL-MCNC: 6.2 G/DL — SIGNIFICANT CHANGE UP (ref 6–8)
RBC # BLD: 4.39 M/UL — SIGNIFICANT CHANGE UP (ref 4.2–5.4)
RBC # FLD: 21.7 % — HIGH (ref 11.5–14.5)
SODIUM SERPL-SCNC: 133 MMOL/L — LOW (ref 135–146)
WBC # BLD: 7.77 K/UL — SIGNIFICANT CHANGE UP (ref 4.8–10.8)
WBC # FLD AUTO: 7.77 K/UL — SIGNIFICANT CHANGE UP (ref 4.8–10.8)

## 2024-05-04 PROCEDURE — 99232 SBSQ HOSP IP/OBS MODERATE 35: CPT

## 2024-05-04 RX ORDER — PANTOPRAZOLE SODIUM 20 MG/1
40 TABLET, DELAYED RELEASE ORAL EVERY 12 HOURS
Refills: 0 | Status: DISCONTINUED | OUTPATIENT
Start: 2024-05-05 | End: 2024-05-13

## 2024-05-04 RX ADMIN — Medication 3 MILLILITER(S): at 13:02

## 2024-05-04 RX ADMIN — Medication 100 MILLIGRAM(S): at 05:33

## 2024-05-04 RX ADMIN — LORATADINE 10 MILLIGRAM(S): 10 TABLET ORAL at 11:43

## 2024-05-04 RX ADMIN — BUDESONIDE AND FORMOTEROL FUMARATE DIHYDRATE 2 PUFF(S): 160; 4.5 AEROSOL RESPIRATORY (INHALATION) at 08:18

## 2024-05-04 RX ADMIN — PANTOPRAZOLE SODIUM 40 MILLIGRAM(S): 20 TABLET, DELAYED RELEASE ORAL at 17:06

## 2024-05-04 RX ADMIN — IRON SUCROSE 110 MILLIGRAM(S): 20 INJECTION, SOLUTION INTRAVENOUS at 12:50

## 2024-05-04 RX ADMIN — Medication 3 MILLILITER(S): at 20:53

## 2024-05-04 RX ADMIN — Medication 100 MILLIGRAM(S): at 17:06

## 2024-05-04 RX ADMIN — PANTOPRAZOLE SODIUM 40 MILLIGRAM(S): 20 TABLET, DELAYED RELEASE ORAL at 05:33

## 2024-05-04 RX ADMIN — Medication 2 MILLIGRAM(S): at 17:08

## 2024-05-04 RX ADMIN — Medication 2 MILLIGRAM(S): at 05:34

## 2024-05-04 RX ADMIN — Medication 3 MILLILITER(S): at 09:15

## 2024-05-04 RX ADMIN — ARIPIPRAZOLE 5 MILLIGRAM(S): 15 TABLET ORAL at 21:25

## 2024-05-04 RX ADMIN — Medication 1 APPLICATION(S): at 17:12

## 2024-05-04 RX ADMIN — Medication 3 MILLILITER(S): at 03:32

## 2024-05-04 RX ADMIN — Medication 1 APPLICATION(S): at 05:34

## 2024-05-04 NOTE — PROGRESS NOTE ADULT - SUBJECTIVE AND OBJECTIVE BOX
pt seen and examined.     My notes supersede resident's notes in case of discrepancy       ROS: no cp, no sob, no n/v, no fever    Vital Signs Last 24 Hrs  T(C): 37.1 (04 May 2024 05:18), Max: 37.2 (03 May 2024 21:00)  T(F): 98.7 (04 May 2024 05:18), Max: 98.9 (03 May 2024 21:00)  HR: 81 (04 May 2024 05:18) (81 - 83)  BP: 125/67 (04 May 2024 05:18) (125/67 - 137/64)  BP(mean): --  RR: 18 (04 May 2024 05:18) (18 - 18)  SpO2: 96% (04 May 2024 05:18) (94% - 96%)    Parameters below as of 03 May 2024 21:00  Patient On (Oxygen Delivery Method): room air  O2 Flow (L/min): 3    physical exam  constitutional NAD, AAOX3, Respiratory  lungs CTA, CVS heart RRR, GI: abdomen Soft NT, ND, BS+, skin: bilat lower ext hyperpigmentation and skin changes, less red and less tender today  neuro exam no focal deficit     MEDICATIONS  (STANDING):  albuterol/ipratropium for Nebulization 3 milliLiter(s) Nebulizer every 6 hours  ARIPiprazole 5 milliGRAM(s) Oral at bedtime  budesonide 160 MICROgram(s)/formoterol 4.5 MICROgram(s) Inhaler 2 Puff(s) Inhalation two times a day  doxycycline monohydrate Capsule 100 milliGRAM(s) Oral every 12 hours  iron sucrose IVPB 200 milliGRAM(s) IV Intermittent every 24 hours  loratadine 10 milliGRAM(s) Oral daily  pantoprazole  Injectable 40 milliGRAM(s) IV Push two times a day  triamcinolone 0.1% Cream 1 Application(s) Topical every 12 hours  trihexyphenidyl 2 milliGRAM(s) Oral two times a day    MEDICATIONS  (PRN):  acetaminophen     Tablet .. 650 milliGRAM(s) Oral every 6 hours PRN Temp greater or equal to 38C (100.4F), Mild Pain (1 - 3), Moderate Pain (4 - 6), Severe Pain (7 - 10)  albuterol    90 MICROgram(s) HFA Inhaler 2 Puff(s) Inhalation every 6 hours PRN for bronchospasm                            8.6    7.77  )-----------( 488      ( 04 May 2024 06:44 )             31.0     05-04    133<L>  |  92<L>  |  8<L>  ----------------------------<  87  4.7   |  35<H>  |  <0.5<L>    Ca    9.8      04 May 2024 06:44  Mg     2.0     05-04    TPro  6.2  /  Alb  3.6  /  TBili  <0.2  /  DBili  x   /  AST  13  /  ALT  9   /  AlkPhos  137<H>  05-04    Ferritin: 10 ng/mL [13 - 330] (05-01-24 @ 11:29)    a/p    68 years old female with PMH of  htn, hdl, bipolar disorder, copd on 2L home O2, chf, chronic lymphedema, asthma presents to ED with c/o shortness of breath and le redness and swelling. Patient noticed b/l le redness that worsened x1 week. Patient also reports shortness of breath from  several days. Pt denies f/c, chest pain, hemoptysis, vomiting, dark/bloody stool. no ac use.  Patient refused to provide with further details saying she wants to sleep, only allowed me to do brief physical exam, she is not wheezing on auscultation, has b/l lower extremity edema with redness (concerning for venous stasis).    # leg edema, redness, tenderness  no sepsis   cellulitis,   cont abx  clinically improving    # sob, multifactorial, copd, ( not hypoxic in this admission, O2 94% on room air) hypercapnic, also exacerbated by anemia   pulm notes appreciated    Nebs Q6 PRN  May benefit from AVAPS QHS and PRN at the following settings:   TV: 385, Epap 6, Imin 10, Imax 20, RR 14, FiO2 35%    pt not very compliant with avap     fu echo rule out chf     # anemia   no acute gross bleeding  recent gib and sp egd in [facility]   fu gi ,   may need repeat egd in this admission   Hemoglobin: 8.6 g/dL (05-04-24 @ 06:44)  Hemoglobin: 9.4 g/dL (05-03-24 @ 07:48)  Hemoglobin: 7.9 g/dL (05-02-24 @ 11:16)  Hemoglobin: 7.8 g/dL (05-01-24 @ 11:29)  Hemoglobin: 7.6 g/dL (04-30-24 @ 19:00)      # HTN , HLD, bipolar cont meds     #Progress Note Handoff    Pending :  clinical improvement   Family discussion: jennifer pt   Disposition: home   code status: full code

## 2024-05-05 ENCOUNTER — RESULT REVIEW (OUTPATIENT)
Age: 69
End: 2024-05-05

## 2024-05-05 LAB
ALBUMIN SERPL ELPH-MCNC: 3.7 G/DL — SIGNIFICANT CHANGE UP (ref 3.5–5.2)
ALP SERPL-CCNC: 132 U/L — HIGH (ref 30–115)
ALT FLD-CCNC: 10 U/L — SIGNIFICANT CHANGE UP (ref 0–41)
ANION GAP SERPL CALC-SCNC: 8 MMOL/L — SIGNIFICANT CHANGE UP (ref 7–14)
AST SERPL-CCNC: 12 U/L — SIGNIFICANT CHANGE UP (ref 0–41)
BASOPHILS # BLD AUTO: 0.06 K/UL — SIGNIFICANT CHANGE UP (ref 0–0.2)
BASOPHILS NFR BLD AUTO: 0.7 % — SIGNIFICANT CHANGE UP (ref 0–1)
BILIRUB SERPL-MCNC: <0.2 MG/DL — SIGNIFICANT CHANGE UP (ref 0.2–1.2)
BUN SERPL-MCNC: 10 MG/DL — SIGNIFICANT CHANGE UP (ref 10–20)
CALCIUM SERPL-MCNC: 9.7 MG/DL — SIGNIFICANT CHANGE UP (ref 8.4–10.4)
CHLORIDE SERPL-SCNC: 93 MMOL/L — LOW (ref 98–110)
CO2 SERPL-SCNC: 32 MMOL/L — SIGNIFICANT CHANGE UP (ref 17–32)
CREAT SERPL-MCNC: <0.5 MG/DL — LOW (ref 0.7–1.5)
EGFR: 108 ML/MIN/1.73M2 — SIGNIFICANT CHANGE UP
EOSINOPHIL # BLD AUTO: 0.37 K/UL — SIGNIFICANT CHANGE UP (ref 0–0.7)
EOSINOPHIL NFR BLD AUTO: 4.2 % — SIGNIFICANT CHANGE UP (ref 0–8)
GLUCOSE SERPL-MCNC: 93 MG/DL — SIGNIFICANT CHANGE UP (ref 70–99)
HCT VFR BLD CALC: 31.4 % — LOW (ref 37–47)
HGB BLD-MCNC: 8.8 G/DL — LOW (ref 12–16)
IMM GRANULOCYTES NFR BLD AUTO: 3.5 % — HIGH (ref 0.1–0.3)
LYMPHOCYTES # BLD AUTO: 1.68 K/UL — SIGNIFICANT CHANGE UP (ref 1.2–3.4)
LYMPHOCYTES # BLD AUTO: 18.9 % — LOW (ref 20.5–51.1)
MAGNESIUM SERPL-MCNC: 1.8 MG/DL — SIGNIFICANT CHANGE UP (ref 1.8–2.4)
MCHC RBC-ENTMCNC: 19.6 PG — LOW (ref 27–31)
MCHC RBC-ENTMCNC: 28 G/DL — LOW (ref 32–37)
MCV RBC AUTO: 70.1 FL — LOW (ref 81–99)
MONOCYTES # BLD AUTO: 0.71 K/UL — HIGH (ref 0.1–0.6)
MONOCYTES NFR BLD AUTO: 8 % — SIGNIFICANT CHANGE UP (ref 1.7–9.3)
NEUTROPHILS # BLD AUTO: 5.76 K/UL — SIGNIFICANT CHANGE UP (ref 1.4–6.5)
NEUTROPHILS NFR BLD AUTO: 64.7 % — SIGNIFICANT CHANGE UP (ref 42.2–75.2)
NRBC # BLD: 0 /100 WBCS — SIGNIFICANT CHANGE UP (ref 0–0)
PLATELET # BLD AUTO: 513 K/UL — HIGH (ref 130–400)
PMV BLD: 8.6 FL — SIGNIFICANT CHANGE UP (ref 7.4–10.4)
POTASSIUM SERPL-MCNC: 4.9 MMOL/L — SIGNIFICANT CHANGE UP (ref 3.5–5)
POTASSIUM SERPL-SCNC: 4.9 MMOL/L — SIGNIFICANT CHANGE UP (ref 3.5–5)
PROT SERPL-MCNC: 6.3 G/DL — SIGNIFICANT CHANGE UP (ref 6–8)
RBC # BLD: 4.48 M/UL — SIGNIFICANT CHANGE UP (ref 4.2–5.4)
RBC # FLD: 22.5 % — HIGH (ref 11.5–14.5)
SODIUM SERPL-SCNC: 133 MMOL/L — LOW (ref 135–146)
WBC # BLD: 8.89 K/UL — SIGNIFICANT CHANGE UP (ref 4.8–10.8)
WBC # FLD AUTO: 8.89 K/UL — SIGNIFICANT CHANGE UP (ref 4.8–10.8)

## 2024-05-05 PROCEDURE — 93306 TTE W/DOPPLER COMPLETE: CPT | Mod: 26

## 2024-05-05 PROCEDURE — 99232 SBSQ HOSP IP/OBS MODERATE 35: CPT

## 2024-05-05 RX ORDER — NICOTINE POLACRILEX 2 MG
1 GUM BUCCAL DAILY
Refills: 0 | Status: DISCONTINUED | OUTPATIENT
Start: 2024-05-05 | End: 2024-05-13

## 2024-05-05 RX ADMIN — ARIPIPRAZOLE 5 MILLIGRAM(S): 15 TABLET ORAL at 21:13

## 2024-05-05 RX ADMIN — Medication 100 MILLIGRAM(S): at 17:31

## 2024-05-05 RX ADMIN — Medication 650 MILLIGRAM(S): at 01:49

## 2024-05-05 RX ADMIN — Medication 100 MILLIGRAM(S): at 05:08

## 2024-05-05 RX ADMIN — Medication 2 MILLIGRAM(S): at 17:31

## 2024-05-05 RX ADMIN — Medication 3 MILLILITER(S): at 19:50

## 2024-05-05 RX ADMIN — Medication 3 MILLILITER(S): at 07:35

## 2024-05-05 RX ADMIN — PANTOPRAZOLE SODIUM 40 MILLIGRAM(S): 20 TABLET, DELAYED RELEASE ORAL at 05:08

## 2024-05-05 RX ADMIN — Medication 1 APPLICATION(S): at 05:09

## 2024-05-05 RX ADMIN — Medication 2 MILLIGRAM(S): at 05:09

## 2024-05-05 RX ADMIN — Medication 3 MILLILITER(S): at 13:26

## 2024-05-05 RX ADMIN — Medication 1 PATCH: at 21:13

## 2024-05-05 RX ADMIN — PANTOPRAZOLE SODIUM 40 MILLIGRAM(S): 20 TABLET, DELAYED RELEASE ORAL at 17:31

## 2024-05-05 RX ADMIN — BUDESONIDE AND FORMOTEROL FUMARATE DIHYDRATE 2 PUFF(S): 160; 4.5 AEROSOL RESPIRATORY (INHALATION) at 12:06

## 2024-05-05 RX ADMIN — Medication 650 MILLIGRAM(S): at 02:19

## 2024-05-05 RX ADMIN — Medication 1 APPLICATION(S): at 17:31

## 2024-05-05 RX ADMIN — LORATADINE 10 MILLIGRAM(S): 10 TABLET ORAL at 12:06

## 2024-05-05 RX ADMIN — IRON SUCROSE 110 MILLIGRAM(S): 20 INJECTION, SOLUTION INTRAVENOUS at 13:21

## 2024-05-05 NOTE — PHYSICAL THERAPY INITIAL EVALUATION ADULT - ADDITIONAL COMMENTS
Pt. reports she was independent at home PTA and lives in an apartment with roommates and an elevator. Pt. reports she uses a Rollator to ambulate.

## 2024-05-05 NOTE — PHYSICAL THERAPY INITIAL EVALUATION ADULT - GENERAL OBSERVATIONS, REHAB EVAL
Detail Level: Detailed
Pt. encountered alert and NAD, supine in bed (+)O2 NC 2L (+)primafit, agreeable to PT IE. Pt. left in b/s recliner (+)alarms (+)call bell in reach, RN requested to leave O2 and primafit off for now, NAD.

## 2024-05-05 NOTE — PHYSICAL THERAPY INITIAL EVALUATION ADULT - PERTINENT HX OF CURRENT PROBLEM, REHAB EVAL
68 years old female with PMH of  htn, hdl, bipolar disorder, copd on 2L home O2, chf, chronic lymphedema, asthma presents to ED with c/o shortness of breath and le redness and swelling. Patient noticed b/l le redness that worsened x1 week. Patient also reports shortness of breath from  several days. Pt denies f/c, chest pain, hemoptysis, vomiting, dark/bloody stool. no ac use.  Patient refused to provide with further details saying she wants to sleep, only allowed me to do brief physical exam, she is not wheezing on auscultation, has b/l lower extremity edema with redness (concerning for venous stasis).

## 2024-05-06 LAB
ALBUMIN SERPL ELPH-MCNC: 3.7 G/DL — SIGNIFICANT CHANGE UP (ref 3.5–5.2)
ALP SERPL-CCNC: 133 U/L — HIGH (ref 30–115)
ALT FLD-CCNC: 9 U/L — SIGNIFICANT CHANGE UP (ref 0–41)
ANION GAP SERPL CALC-SCNC: 10 MMOL/L — SIGNIFICANT CHANGE UP (ref 7–14)
ANISOCYTOSIS BLD QL: SLIGHT — SIGNIFICANT CHANGE UP
AST SERPL-CCNC: 13 U/L — SIGNIFICANT CHANGE UP (ref 0–41)
BASOPHILS # BLD AUTO: 0.09 K/UL — SIGNIFICANT CHANGE UP (ref 0–0.2)
BASOPHILS NFR BLD AUTO: 0.9 % — SIGNIFICANT CHANGE UP (ref 0–1)
BILIRUB SERPL-MCNC: <0.2 MG/DL — SIGNIFICANT CHANGE UP (ref 0.2–1.2)
BUN SERPL-MCNC: 8 MG/DL — LOW (ref 10–20)
BURR CELLS BLD QL SMEAR: PRESENT — SIGNIFICANT CHANGE UP
CALCIUM SERPL-MCNC: 9.9 MG/DL — SIGNIFICANT CHANGE UP (ref 8.4–10.5)
CHLORIDE SERPL-SCNC: 92 MMOL/L — LOW (ref 98–110)
CO2 SERPL-SCNC: 31 MMOL/L — SIGNIFICANT CHANGE UP (ref 17–32)
CREAT SERPL-MCNC: <0.5 MG/DL — LOW (ref 0.7–1.5)
EGFR: 108 ML/MIN/1.73M2 — SIGNIFICANT CHANGE UP
EOSINOPHIL # BLD AUTO: 0.33 K/UL — SIGNIFICANT CHANGE UP (ref 0–0.7)
EOSINOPHIL NFR BLD AUTO: 3.5 % — SIGNIFICANT CHANGE UP (ref 0–8)
GIANT PLATELETS BLD QL SMEAR: PRESENT — SIGNIFICANT CHANGE UP
GLUCOSE SERPL-MCNC: 82 MG/DL — SIGNIFICANT CHANGE UP (ref 70–99)
HCT VFR BLD CALC: 33.4 % — LOW (ref 37–47)
HGB BLD-MCNC: 9.3 G/DL — LOW (ref 12–16)
HYPOCHROMIA BLD QL: SLIGHT — SIGNIFICANT CHANGE UP
LYMPHOCYTES # BLD AUTO: 1.73 K/UL — SIGNIFICANT CHANGE UP (ref 1.2–3.4)
LYMPHOCYTES # BLD AUTO: 18.3 % — LOW (ref 20.5–51.1)
MAGNESIUM SERPL-MCNC: 1.9 MG/DL — SIGNIFICANT CHANGE UP (ref 1.8–2.4)
MANUAL SMEAR VERIFICATION: SIGNIFICANT CHANGE UP
MCHC RBC-ENTMCNC: 19.9 PG — LOW (ref 27–31)
MCHC RBC-ENTMCNC: 27.8 G/DL — LOW (ref 32–37)
MCV RBC AUTO: 71.5 FL — LOW (ref 81–99)
METAMYELOCYTES # FLD: 1.7 % — HIGH (ref 0–0)
MICROCYTES BLD QL: SLIGHT — SIGNIFICANT CHANGE UP
MONOCYTES # BLD AUTO: 0.41 K/UL — SIGNIFICANT CHANGE UP (ref 0.1–0.6)
MONOCYTES NFR BLD AUTO: 4.3 % — SIGNIFICANT CHANGE UP (ref 1.7–9.3)
MYELOCYTES NFR BLD: 4.3 % — HIGH (ref 0–0)
NEUTROPHILS # BLD AUTO: 6.25 K/UL — SIGNIFICANT CHANGE UP (ref 1.4–6.5)
NEUTROPHILS NFR BLD AUTO: 66.1 % — SIGNIFICANT CHANGE UP (ref 42.2–75.2)
PLAT MORPH BLD: NORMAL — SIGNIFICANT CHANGE UP
PLATELET # BLD AUTO: 473 K/UL — HIGH (ref 130–400)
PMV BLD: 8.5 FL — SIGNIFICANT CHANGE UP (ref 7.4–10.4)
POIKILOCYTOSIS BLD QL AUTO: SLIGHT — SIGNIFICANT CHANGE UP
POLYCHROMASIA BLD QL SMEAR: SLIGHT — SIGNIFICANT CHANGE UP
POTASSIUM SERPL-MCNC: 5 MMOL/L — SIGNIFICANT CHANGE UP (ref 3.5–5)
POTASSIUM SERPL-SCNC: 5 MMOL/L — SIGNIFICANT CHANGE UP (ref 3.5–5)
PROMYELOCYTES # FLD: 0.9 % — HIGH (ref 0–0)
PROT SERPL-MCNC: 6.4 G/DL — SIGNIFICANT CHANGE UP (ref 6–8)
RBC # BLD: 4.67 M/UL — SIGNIFICANT CHANGE UP (ref 4.2–5.4)
RBC # FLD: 23.3 % — HIGH (ref 11.5–14.5)
RBC BLD AUTO: ABNORMAL
SODIUM SERPL-SCNC: 133 MMOL/L — LOW (ref 135–146)
WBC # BLD: 9.46 K/UL — SIGNIFICANT CHANGE UP (ref 4.8–10.8)
WBC # FLD AUTO: 9.46 K/UL — SIGNIFICANT CHANGE UP (ref 4.8–10.8)

## 2024-05-06 PROCEDURE — 99232 SBSQ HOSP IP/OBS MODERATE 35: CPT

## 2024-05-06 PROCEDURE — 99233 SBSQ HOSP IP/OBS HIGH 50: CPT

## 2024-05-06 RX ORDER — FUROSEMIDE 40 MG
40 TABLET ORAL DAILY
Refills: 0 | Status: DISCONTINUED | OUTPATIENT
Start: 2024-05-06 | End: 2024-05-13

## 2024-05-06 RX ORDER — CLOTRIMAZOLE AND BETAMETHASONE DIPROPIONATE 10; .5 MG/G; MG/G
1 CREAM TOPICAL
Refills: 0 | Status: DISCONTINUED | OUTPATIENT
Start: 2024-05-06 | End: 2024-05-06

## 2024-05-06 RX ADMIN — Medication 40 MILLIGRAM(S): at 15:03

## 2024-05-06 RX ADMIN — BUDESONIDE AND FORMOTEROL FUMARATE DIHYDRATE 2 PUFF(S): 160; 4.5 AEROSOL RESPIRATORY (INHALATION) at 11:25

## 2024-05-06 RX ADMIN — Medication 1 PATCH: at 11:43

## 2024-05-06 RX ADMIN — IRON SUCROSE 110 MILLIGRAM(S): 20 INJECTION, SOLUTION INTRAVENOUS at 11:26

## 2024-05-06 RX ADMIN — Medication 100 MILLIGRAM(S): at 05:30

## 2024-05-06 RX ADMIN — Medication 2 MILLIGRAM(S): at 17:29

## 2024-05-06 RX ADMIN — Medication 3 MILLILITER(S): at 14:00

## 2024-05-06 RX ADMIN — Medication 1 PATCH: at 19:46

## 2024-05-06 RX ADMIN — Medication 2 MILLIGRAM(S): at 05:29

## 2024-05-06 RX ADMIN — BUDESONIDE AND FORMOTEROL FUMARATE DIHYDRATE 2 PUFF(S): 160; 4.5 AEROSOL RESPIRATORY (INHALATION) at 21:32

## 2024-05-06 RX ADMIN — PANTOPRAZOLE SODIUM 40 MILLIGRAM(S): 20 TABLET, DELAYED RELEASE ORAL at 17:29

## 2024-05-06 RX ADMIN — LORATADINE 10 MILLIGRAM(S): 10 TABLET ORAL at 11:25

## 2024-05-06 RX ADMIN — Medication 1 PATCH: at 21:39

## 2024-05-06 RX ADMIN — PANTOPRAZOLE SODIUM 40 MILLIGRAM(S): 20 TABLET, DELAYED RELEASE ORAL at 05:29

## 2024-05-06 RX ADMIN — Medication 1 PATCH: at 19:47

## 2024-05-06 RX ADMIN — Medication 3 MILLILITER(S): at 20:12

## 2024-05-06 RX ADMIN — Medication 1 APPLICATION(S): at 05:30

## 2024-05-06 RX ADMIN — ARIPIPRAZOLE 5 MILLIGRAM(S): 15 TABLET ORAL at 21:33

## 2024-05-06 NOTE — PROGRESS NOTE ADULT - SUBJECTIVE AND OBJECTIVE BOX
24H events:    Patient is a 68y old Female who presents with a chief complaint of shortness of breath (05 May 2024 00:25)    Primary diagnosis of COPD exacerbation    Today is hospital day 6d. This morning patient was seen and examined at bedside, resting comfortably in bed.    No acute or major events overnight.    Code Status:    Family communication:  Contact date:  Name of person contacted:  Relationship to patient:  Communication details:  What matters most:    PAST MEDICAL & SURGICAL HISTORY  COPD (chronic obstructive pulmonary disease)    Asthma    Lymphedema    Bipolar disorder    CHF (congestive heart failure)    History of hernia repair    History of D&C    H/O  section      SOCIAL HISTORY:  Social History:      ALLERGIES:  penicillin (Short breath)  eggs (Short breath)    MEDICATIONS:  STANDING MEDICATIONS  albuterol/ipratropium for Nebulization 3 milliLiter(s) Nebulizer every 6 hours  ARIPiprazole 5 milliGRAM(s) Oral at bedtime  budesonide 160 MICROgram(s)/formoterol 4.5 MICROgram(s) Inhaler 2 Puff(s) Inhalation two times a day  iron sucrose IVPB 200 milliGRAM(s) IV Intermittent every 24 hours  loratadine 10 milliGRAM(s) Oral daily  nicotine -  14 mG/24Hr(s) Patch 1 Patch Transdermal daily  pantoprazole    Tablet 40 milliGRAM(s) Oral every 12 hours  triamcinolone 0.1% Cream 1 Application(s) Topical every 12 hours  trihexyphenidyl 2 milliGRAM(s) Oral two times a day    PRN MEDICATIONS  acetaminophen     Tablet .. 650 milliGRAM(s) Oral every 6 hours PRN  albuterol    90 MICROgram(s) HFA Inhaler 2 Puff(s) Inhalation every 6 hours PRN    VITALS:   T(F): 97.4  HR: 69  BP: 134/77  RR: 19  SpO2: 96%    PHYSICAL EXAM:      GENERAL: NAD, well-groomed, well-developed  NERVOUS SYSTEM:  Alert & Oriented X3,   PULM: Clear to auscultation bilaterally, faint wheezing   CARDIAC: Regular rate and rhythm; No murmurs, rubs, or gallops  GI: Soft, Nontender, Nondistended; Bowel sounds present  EXTREMITIES: Lipedema, erythema bilateral ( improved)  L>R           (  ) Indwelling Segundo Catheter:   Date insterted:    Reason (  ) Critical illness     (  ) urinary retention    (  ) Accurate Ins/Outs Monitoring     (  ) CMO patient    (  ) Central Line:   Date inserted:  Location: (  ) Right IJ     (  ) Left IJ     (  ) Right Fem     (  ) Left Fem    (  ) SPC        (  ) pigtail       (  ) PEG tube       (  ) colostomy       (  ) jejunostomy  (  ) U-Dall    LABS:                        9.3    9.46  )-----------( 473      ( 06 May 2024 06:12 )             33.4     05-    133<L>  |  92<L>  |  8<L>  ----------------------------<  82  5.0   |  31  |  <0.5<L>    Ca    9.9      06 May 2024 06:12  Mg     1.9     -    TPro  6.4  /  Alb  3.7  /  TBili  <0.2  /  DBili  x   /  AST  13  /  ALT  9   /  AlkPhos  133<H>  -      Urinalysis Basic - ( 06 May 2024 06:12 )    Color: x / Appearance: x / SG: x / pH: x  Gluc: 82 mg/dL / Ketone: x  / Bili: x / Urobili: x   Blood: x / Protein: x / Nitrite: x   Leuk Esterase: x / RBC: x / WBC x   Sq Epi: x / Non Sq Epi: x / Bacteria: x                RADIOLOGY:

## 2024-05-06 NOTE — PROGRESS NOTE ADULT - ASSESSMENT
68 years old female with PMH of  htn, hld, bipolar disorder, copd on 2L home O2, chf, chronic lipedema, asthma presents to ED with c/o shortness of breath and le redness and swelling.    #Shortness of breath secondary possible acute  CHF HFpEF, (orthopnea and no wheezing) tirggered by new worsening microcytic anemia   #hx of COPD   * on 2L home O2, but now refusing to use  oxygen at hospital (satting 92%)  * pt reported that she had bleeding from PUD but not clear if any intervention was done. Need to get records from [facility] .   - Patient was recently admitted to [facility] for hematemesis.  Records from [facility] reviewed. EGD 5/2/24 report: bleeding duodenal ulcer in second portion of duodenum, s/p clipping.  - VBG:  pH: 7.31, PCO2: 69, HCO3: 35. might be obesity hypoventilation   -   - ECG :TWI V1, V2                     - CXR:  Interstitial infiltrate. BNP not that elevated but pt is obese            -LUNA: negative   - Cont  lasix 40mg IV bid . Might add spironolactone if K<5   - PPI IV bid   - Trop negative *2   - Oxygen protocol now on 3 liter   - Start venofer 200mg IV d:1 (Should be safe 24 hour after abs coverage)   - GI consult appreciated   - s/p aztreonem, flagyl, solumedrol and nebs in ED for COPD exacerbation     ##Possible  Lower extremity cellulitis Left > Right in a pt with lipedema + stasis dermatitis   * Pt with baseline lipedema cuff sign  noted   - Cont doxycycline d:2   - Leonidas the erythema area  - Keep left leg elevated  - venous duplex:WNL   - Steroid  cream bid       #Hypercapnia likely due to obesity hypoventilation syndrome   - Repeat ABG:   - Pulmonology following, recommends Bipap during sleep     #Abdomen hernia  - Patient complains of pain around ventral abdomen hernia  - o/e: local abdomen tenderness  - fu CT abdomen       DVT prophylaxis: LOVENOX   Activity:  Diet: DASH  Dispo:  Code: FULL

## 2024-05-06 NOTE — PROGRESS NOTE ADULT - ASSESSMENT
68 years old female with PMH of  htn, hdl, bipolar disorder, copd on 2L home O2, chf, chronic lymphedema, asthma, sp hernia repair presents to ED with c/o shortness of breath and LE erythema and swelling x2 weeks. GI consulted for acute on chronic anemia.     #Acute microcytic anemia with NARCISA - no overt GI bleed  - Hemodynamically stable & no active bleeding  - Hemoglobin on admission - 7.6, MCV 70.9 > 8.8. No transfusion   - iron: 19, %sat: 6, TIBC: 332  - patient is on PO iron therapy  - not on AC  - Refused LUNA  - EGD 2/5/24 at [facility] by  for hematemesis, source could not be identified clearly , clip was placed in the duodenum for possible IR GDA embolization, patient does not remember any IR embolization done  CT A/P noted, large ventral hernia, pelvic lymphadenopathy     #Rec  - recommend abdominal imaging given pain around hernia site with surgery follow up  - will recommend EGD prior to discharge when clinically optimized  - Patient is short of breath today, please obtain pulmonary risk stratification for endoscopy.   - c/w pantoprazole 40 PO BID  - will recommend outpatient workup for colonoscopy pending surgery evaluation for hernia  - Miralax daily  - Maintain active Type and screen  - c/w iron per primary team  - Please avoid any NSAIDs  - monitor BMs    #Elevated ALP    RECS  - recommend GGT, ALP isoenzymes  - trend LFTs

## 2024-05-06 NOTE — PROGRESS NOTE ADULT - SUBJECTIVE AND OBJECTIVE BOX
pt seen and examined.     My notes supersede resident's notes in case of discrepancy       ROS: no cp, no sob, no n/v, no fever    Vital Signs Last 24 Hrs  T(C): 36.3 (06 May 2024 05:01), Max: 36.8 (05 May 2024 21:29)  T(F): 97.4 (06 May 2024 05:01), Max: 98.2 (05 May 2024 21:29)  HR: 69 (06 May 2024 05:01) (69 - 79)  BP: 134/77 (06 May 2024 05:01) (132/74 - 145/71)  BP(mean): --  RR: 19 (06 May 2024 05:01) (18 - 19)  SpO2: 96% (06 May 2024 05:01) (96% - 98%)    Parameters below as of 06 May 2024 05:01  Patient On (Oxygen Delivery Method): nasal cannula        physical exam  constitutional NAD, AAOX3, Respiratory  lungs CTA, CVS heart RRR, GI: abdomen Soft NT, ND, BS+, skin: bilat edema and chronic skin changes on the lower ext, L>R, less tender and less erythematous today   neuro exam no focal deficit , walks with walker     MEDICATIONS  (STANDING):  albuterol/ipratropium for Nebulization 3 milliLiter(s) Nebulizer every 6 hours  ARIPiprazole 5 milliGRAM(s) Oral at bedtime  budesonide 160 MICROgram(s)/formoterol 4.5 MICROgram(s) Inhaler 2 Puff(s) Inhalation two times a day  iron sucrose IVPB 200 milliGRAM(s) IV Intermittent every 24 hours  loratadine 10 milliGRAM(s) Oral daily  nicotine -  14 mG/24Hr(s) Patch 1 Patch Transdermal daily  pantoprazole    Tablet 40 milliGRAM(s) Oral every 12 hours  triamcinolone 0.1% Cream 1 Application(s) Topical every 12 hours  trihexyphenidyl 2 milliGRAM(s) Oral two times a day    MEDICATIONS  (PRN):  acetaminophen     Tablet .. 650 milliGRAM(s) Oral every 6 hours PRN Temp greater or equal to 38C (100.4F), Mild Pain (1 - 3), Moderate Pain (4 - 6), Severe Pain (7 - 10)  albuterol    90 MICROgram(s) HFA Inhaler 2 Puff(s) Inhalation every 6 hours PRN for bronchospasm                          9.3    9.46  )-----------( 473      ( 06 May 2024 06:12 )             33.4     05-06    133<L>  |  92<L>  |  8<L>  ----------------------------<  82  5.0   |  31  |  <0.5<L>    Ca    9.9      06 May 2024 06:12  Mg     1.9     05-06    TPro  6.4  /  Alb  3.7  /  TBili  <0.2  /  DBili  x   /  AST  13  /  ALT  9   /  AlkPhos  133<H>  05-06    Ferritin: 10 ng/mL [13 - 330] (05-01-24 @ 11:29)    a/p  68 years old female with PMH of  htn, hdl, bipolar disorder, copd on 2L home O2, chf, chronic lymphedema, asthma presents to ED with c/o shortness of breath and le redness and swelling. Patient noticed b/l le redness that worsened x1 week. Patient also reports shortness of breath from  several days. Pt denies f/c, chest pain, hemoptysis, vomiting, dark/bloody stool. no ac use.  Patient refused to provide with further details saying she wants to sleep, only allowed me to do brief physical exam, she is not wheezing on auscultation, has b/l lower extremity edema with redness (concerning for venous stasis).    # leg edema, redness, tenderness  no sepsis   cellulitis,   cont abx  clinically improving  cont topical therapy    # sob, multifactorial, copd, ( not hypoxic in this admission, O2 94% on room air) hypercapnic, also exacerbated by anemia   possible Acute on chronic HFpEF  pulm notes appreciated  cont diuretics    Nebs Q6 PRN  May benefit from AVAPS QHS and PRN at the following settings:   TV: 385, Epap 6, Imin 10, Imax 20, RR 14, FiO2 35%    pt not compliant with avap     fu echo rule out chf     # anemia   no acute gross bleeding  recent gib and sp egd in [facility]   fu gi , per GI may need repeat egd in this admission   Hemoglobin: 9.3 g/dL (05-06-24 @ 06:12)  Hemoglobin: 8.8 g/dL (05-05-24 @ 08:17)  Hemoglobin: 8.6 g/dL (05-04-24 @ 06:44)  Hemoglobin: 9.4 g/dL (05-03-24 @ 07:48)  cont venofer    # HTN , HLD, bipolar cont meds     #Progress Note Handoff    Pending :  clinical improvement   Family discussion: jennifer pt   Disposition: STR,   code status, full code

## 2024-05-06 NOTE — PROGRESS NOTE ADULT - SUBJECTIVE AND OBJECTIVE BOX
Gastroenterology progress note:     Patient is a 68y old  Female who presents with a chief complaint of shortness of breath (06 May 2024 12:02)       Admitted on: 24    We are following the patient for anemia.     no overnight events   SOB improved.     PAST MEDICAL & SURGICAL HISTORY:  COPD (chronic obstructive pulmonary disease)      Asthma      Lymphedema      Bipolar disorder      CHF (congestive heart failure)      History of hernia repair      History of D&C      H/O  section          MEDICATIONS  (STANDING):  albuterol/ipratropium for Nebulization 3 milliLiter(s) Nebulizer every 6 hours  ARIPiprazole 5 milliGRAM(s) Oral at bedtime  budesonide 160 MICROgram(s)/formoterol 4.5 MICROgram(s) Inhaler 2 Puff(s) Inhalation two times a day  clotrimazole/betamethasone Cream 1 Application(s) Topical two times a day  furosemide    Tablet 40 milliGRAM(s) Oral daily  loratadine 10 milliGRAM(s) Oral daily  nicotine -  14 mG/24Hr(s) Patch 1 Patch Transdermal daily  pantoprazole    Tablet 40 milliGRAM(s) Oral every 12 hours  trihexyphenidyl 2 milliGRAM(s) Oral two times a day    MEDICATIONS  (PRN):  acetaminophen     Tablet .. 650 milliGRAM(s) Oral every 6 hours PRN Temp greater or equal to 38C (100.4F), Mild Pain (1 - 3), Moderate Pain (4 - 6), Severe Pain (7 - 10)  albuterol    90 MICROgram(s) HFA Inhaler 2 Puff(s) Inhalation every 6 hours PRN for bronchospasm      Allergies  penicillin (Short breath)  eggs (Short breath)      Review of Systems:   Cardiovascular:  No Chest Pain, No Palpitations  Respiratory:  No Cough, No Dyspnea  Gastrointestinal:  As described in HPI  Skin:  No Skin Lesions, No Jaundice  Neuro:  No Syncope, No Dizziness    Physical Examination:  T(C): 36.3 (24 @ 05:01), Max: 36.8 (24 @ 21:29)  HR: 71 (24 @ 12:51) (69 - 79)  BP: 130/76 (24 @ 12:51) (130/76 - 145/71)  RR: 18 (24 @ 12:51) (18 - 19)  SpO2: 98% (24 @ 12:51) (96% - 98%)    GENERAL: AAOx3, no acute distress.  HEAD:  Atraumatic, Normocephalic  EYES: conjunctiva and sclera clear  NECK: Supple, no JVD or thyromegaly  CHEST/LUNG: Clear to auscultation bilaterally  HEART: Regular rate and rhythm; normal S1, S2  ABDOMEN: Soft, nontender, nondistended.   NEUROLOGY: No asterixis or tremor.   SKIN: Intact, no jaundice     Data:                        9.3    9.46  )-----------( 473      ( 06 May 2024 06:12 )             33.4     Hgb trend:  9.3  24 @ 06:12  8.8  24 @ 08:17  8.6  24 @ 06:44      05-    133<L>  |  92<L>  |  8<L>  ----------------------------<  82  5.0   |  31  |  <0.5<L>    Ca    9.9      06 May 2024 06:12  Mg     1.9         TPro  6.4  /  Alb  3.7  /  TBili  <0.2  /  DBili  x   /  AST  13  /  ALT  9   /  AlkPhos  133<H>      Liver panel trend:  TBili <0.2   /   AST 13   /   ALT 9   /   AlkP 133   /   Tptn 6.4   /   Alb 3.7    /   DBili --      05-06  TBili <0.2   /   AST 12   /   ALT 10   /   AlkP 132   /   Tptn 6.3   /   Alb 3.7    /   DBili --      05-05  TBili <0.2   /   AST 13   /   ALT 9   /   AlkP 137   /   Tptn 6.2   /   Alb 3.6    /   DBili --      05-04  TBili 0.2   /   AST 15   /   ALT 10   /   AlkP 150   /   Tptn 7.0   /   Alb 3.8    /   DBili --      05-03  TBili <0.2   /   AST 10   /   ALT 8   /   AlkP 131   /   Tptn 6.1   /   Alb 3.4    /   DBili --      05-02  TBili <0.2   /   AST 19   /   ALT 10   /   AlkP 143   /   Tptn 6.0   /   Alb 3.4    /   DBili --                   Radiology:

## 2024-05-07 PROCEDURE — 99233 SBSQ HOSP IP/OBS HIGH 50: CPT

## 2024-05-07 PROCEDURE — 99232 SBSQ HOSP IP/OBS MODERATE 35: CPT

## 2024-05-07 PROCEDURE — 99232 SBSQ HOSP IP/OBS MODERATE 35: CPT | Mod: GC

## 2024-05-07 RX ORDER — POLYETHYLENE GLYCOL 3350 17 G/17G
17 POWDER, FOR SOLUTION ORAL DAILY
Refills: 0 | Status: DISCONTINUED | OUTPATIENT
Start: 2024-05-07 | End: 2024-05-13

## 2024-05-07 RX ORDER — CHLORHEXIDINE GLUCONATE 213 G/1000ML
1 SOLUTION TOPICAL DAILY
Refills: 0 | Status: DISCONTINUED | OUTPATIENT
Start: 2024-05-07 | End: 2024-05-13

## 2024-05-07 RX ORDER — TIOTROPIUM BROMIDE AND OLODATEROL 3.124; 2.736 UG/1; UG/1
2 SPRAY, METERED RESPIRATORY (INHALATION) DAILY
Refills: 0 | Status: DISCONTINUED | OUTPATIENT
Start: 2024-05-07 | End: 2024-05-13

## 2024-05-07 RX ADMIN — Medication 650 MILLIGRAM(S): at 21:30

## 2024-05-07 RX ADMIN — Medication 1 APPLICATION(S): at 05:30

## 2024-05-07 RX ADMIN — Medication 40 MILLIGRAM(S): at 05:28

## 2024-05-07 RX ADMIN — Medication 1 APPLICATION(S): at 17:57

## 2024-05-07 RX ADMIN — LORATADINE 10 MILLIGRAM(S): 10 TABLET ORAL at 12:35

## 2024-05-07 RX ADMIN — ARIPIPRAZOLE 5 MILLIGRAM(S): 15 TABLET ORAL at 21:15

## 2024-05-07 RX ADMIN — Medication 2 MILLIGRAM(S): at 17:56

## 2024-05-07 RX ADMIN — Medication 1 PATCH: at 20:03

## 2024-05-07 RX ADMIN — BUDESONIDE AND FORMOTEROL FUMARATE DIHYDRATE 2 PUFF(S): 160; 4.5 AEROSOL RESPIRATORY (INHALATION) at 21:15

## 2024-05-07 RX ADMIN — Medication 3 MILLILITER(S): at 19:18

## 2024-05-07 RX ADMIN — Medication 650 MILLIGRAM(S): at 22:30

## 2024-05-07 RX ADMIN — PANTOPRAZOLE SODIUM 40 MILLIGRAM(S): 20 TABLET, DELAYED RELEASE ORAL at 17:56

## 2024-05-07 RX ADMIN — Medication 1 PATCH: at 12:09

## 2024-05-07 RX ADMIN — Medication 1 PATCH: at 12:35

## 2024-05-07 RX ADMIN — Medication 2 MILLIGRAM(S): at 05:28

## 2024-05-07 RX ADMIN — PANTOPRAZOLE SODIUM 40 MILLIGRAM(S): 20 TABLET, DELAYED RELEASE ORAL at 05:29

## 2024-05-07 NOTE — PROGRESS NOTE ADULT - SUBJECTIVE AND OBJECTIVE BOX
24H events:    Patient is a 68y old Female who presents with a chief complaint of shortness of breath (06 May 2024 13:42)    Primary diagnosis of COPD exacerbation      Day 1:  Day 2:  Day 3:     Today is hospital day 7d. This morning patient was seen and examined at bedside, resting comfortably in bed.    No acute or major events overnight.    Code Status:    Family communication:  Contact date:  Name of person contacted:  Relationship to patient:  Communication details:  What matters most:    PAST MEDICAL & SURGICAL HISTORY  COPD (chronic obstructive pulmonary disease)    Asthma    Lymphedema    Bipolar disorder    CHF (congestive heart failure)    History of hernia repair    History of D&C    H/O  section      SOCIAL HISTORY:  Social History:      ALLERGIES:  penicillin (Short breath)  eggs (Short breath)    MEDICATIONS:  STANDING MEDICATIONS  albuterol/ipratropium for Nebulization 3 milliLiter(s) Nebulizer every 6 hours  ARIPiprazole 5 milliGRAM(s) Oral at bedtime  betamethasone valerate 0.1% Cream 1 Application(s) Topical two times a day  budesonide 160 MICROgram(s)/formoterol 4.5 MICROgram(s) Inhaler 2 Puff(s) Inhalation two times a day  clotrimazole 1% Cream 1 Application(s) Topical two times a day  furosemide    Tablet 40 milliGRAM(s) Oral daily  loratadine 10 milliGRAM(s) Oral daily  nicotine -  14 mG/24Hr(s) Patch 1 Patch Transdermal daily  pantoprazole    Tablet 40 milliGRAM(s) Oral every 12 hours  trihexyphenidyl 2 milliGRAM(s) Oral two times a day    PRN MEDICATIONS  acetaminophen     Tablet .. 650 milliGRAM(s) Oral every 6 hours PRN  albuterol    90 MICROgram(s) HFA Inhaler 2 Puff(s) Inhalation every 6 hours PRN    VITALS:   T(F): 98.5  HR: 79  BP: 145/71  RR: 19  SpO2: 94%    PHYSICAL EXAM:       GENERAL: NAD, well-groomed, well-developed  NERVOUS SYSTEM:  Alert & Oriented X3,   PULM: Clear to auscultation bilaterally, faint wheezing   CARDIAC: Regular rate and rhythm; No murmurs, rubs, or gallops  GI: Soft, Nontender, Nondistended; Bowel sounds present  EXTREMITIES: Lipedema, erythema bilateral ( improved)  L>R         (  ) Indwelling Segundo Catheter:   Date insterted:    Reason (  ) Critical illness     (  ) urinary retention    (  ) Accurate Ins/Outs Monitoring     (  ) CMO patient    (  ) Central Line:   Date inserted:  Location: (  ) Right IJ     (  ) Left IJ     (  ) Right Fem     (  ) Left Fem    (  ) SPC        (  ) pigtail       (  ) PEG tube       (  ) colostomy       (  ) jejunostomy  (  ) U-Dall    LABS:                        9.3    9.46  )-----------( 473      ( 06 May 2024 06:12 )             33.4     05-    133<L>  |  92<L>  |  8<L>  ----------------------------<  82  5.0   |  31  |  <0.5<L>    Ca    9.9      06 May 2024 06:12  Mg     1.9     -    TPro  6.4  /  Alb  3.7  /  TBili  <0.2  /  DBili  x   /  AST  13  /  ALT  9   /  AlkPhos  133<H>  -      Urinalysis Basic - ( 06 May 2024 06:12 )    Color: x / Appearance: x / SG: x / pH: x  Gluc: 82 mg/dL / Ketone: x  / Bili: x / Urobili: x   Blood: x / Protein: x / Nitrite: x   Leuk Esterase: x / RBC: x / WBC x   Sq Epi: x / Non Sq Epi: x / Bacteria: x                RADIOLOGY:

## 2024-05-07 NOTE — PROGRESS NOTE ADULT - ASSESSMENT
68 years old female with PMH of  htn, hld, bipolar disorder, copd on 2L home O2, chf, chronic lipedema, asthma presents to ED with c/o shortness of breath and le redness and swelling.    #Shortness of breath secondary possible acute  CHF HFpEF, (orthopnea and no wheezing) tirggered by new worsening microcytic anemia   #hx of COPD   * on 2L home O2, but now refusing to use  oxygen at hospital (satting 92%)  * pt reported that she had bleeding from PUD but not clear if any intervention was done. Need to get records from [facility] .   - Patient was recently admitted to [facility] for hematemesis.  Records from [facility] reviewed. EGD 5/2/24 report: bleeding duodenal ulcer in second portion of duodenum, s/p clipping.  - VBG:  pH: 7.31, PCO2: 69, HCO3: 35. might be obesity hypoventilation   -   - ECG :TWI V1, V2                     - CXR:  Interstitial infiltrate. BNP not that elevated but pt is obese            -LUNA: negative   - Cont  lasix 40mg IV bid . Might add spironolactone if K<5   - PPI IV bid   - Trop negative *2   - Oxygen protocol now on 3 liter   - Start venofer 200mg IV d:1 (Should be safe 24 hour after abs coverage)   - GI consult appreciated : - will recommend EGD prior to discharge when clinically optimized, please obtain pulmonary risk stratification for endoscopy.   - s/p aztreonem, flagyl, solumedrol and nebs in ED for COPD exacerbation     ##Possible  Lower extremity cellulitis Left > Right in a pt with lipedema + stasis dermatitis   * Pt with baseline lipedema cuff sign  noted   - Cont doxycycline d:2   - Leonidas the erythema area  - Keep left leg elevated  - venous duplex:WNL   - Steroid  cream bid       #Hypercapnia likely due to obesity hypoventilation syndrome   - Repeat ABG:   - Pulmonology following, recommends Bipap during sleep     #Abdomen hernia  - Patient complains of pain around ventral abdomen hernia  - o/e: local abdomen tenderness  - fu CT abdomen       DVT prophylaxis: LOVENOX   Activity:  Diet: DASH  Dispo:  Code: FULL

## 2024-05-07 NOTE — PROGRESS NOTE ADULT - ASSESSMENT
68 years old female with PMH of  htn, hdl, bipolar disorder, copd on 2L home O2, chf, chronic lymphedema, asthma, sp hernia repair presents to ED with c/o shortness of breath and LE erythema and swelling x2 weeks. GI consulted for acute on chronic anemia.     #Acute microcytic anemia with NARCISA - no overt GI bleed  - Hemodynamically stable & no active bleeding  - Hemoglobin on admission - 7.6, MCV 70.9 > 8.8. No transfusion   - iron: 19, %sat: 6, TIBC: 332  - patient is on PO iron therapy  - not on AC  - Refused LUNA  - EGD 2/5/24 at [facility] by  for hematemesis, source could not be identified clearly , clip was placed in the duodenum for possible IR GDA embolization, patient does not remember any IR embolization done  CT A/P noted, large ventral hernia, pelvic lymphadenopathy     #Rec  - EGD on Thursday   - NPO tomorrow after midnight   - Pulmonary recs noted   - c/w pantoprazole 40 PO BID  - will recommend outpatient workup for colonoscopy pending surgery evaluation for hernia  - Miralax daily  - Maintain active Type and screen  - c/w iron per primary team  - Please avoid any NSAIDs  - monitor BMs    #Elevated ALP  GGT 52     RECS  - ALP isoenzymes  - trend LFTs

## 2024-05-07 NOTE — PROGRESS NOTE ADULT - SUBJECTIVE AND OBJECTIVE BOX
Gastroenterology progress note:     Patient is a 68y old  Female who presents with a chief complaint of shortness of breath (07 May 2024 13:12)       Admitted on: 24    We are following the patient for anemia     no overnight events       PAST MEDICAL & SURGICAL HISTORY:  COPD (chronic obstructive pulmonary disease)      Asthma      Lymphedema      Bipolar disorder      CHF (congestive heart failure)      History of hernia repair      History of D&C      H/O  section          MEDICATIONS  (STANDING):  albuterol/ipratropium for Nebulization 3 milliLiter(s) Nebulizer every 6 hours  ARIPiprazole 5 milliGRAM(s) Oral at bedtime  betamethasone valerate 0.1% Cream 1 Application(s) Topical two times a day  budesonide 160 MICROgram(s)/formoterol 4.5 MICROgram(s) Inhaler 2 Puff(s) Inhalation two times a day  clotrimazole 1% Cream 1 Application(s) Topical two times a day  furosemide    Tablet 40 milliGRAM(s) Oral daily  loratadine 10 milliGRAM(s) Oral daily  nicotine -  14 mG/24Hr(s) Patch 1 Patch Transdermal daily  pantoprazole    Tablet 40 milliGRAM(s) Oral every 12 hours  tiotropium 2.5 MICROgram(s)/olodaterol 2.5 MICROgram(s) (STIOLTO) Inhaler 2 Puff(s) Inhalation daily  trihexyphenidyl 2 milliGRAM(s) Oral two times a day    MEDICATIONS  (PRN):  acetaminophen     Tablet .. 650 milliGRAM(s) Oral every 6 hours PRN Temp greater or equal to 38C (100.4F), Mild Pain (1 - 3), Moderate Pain (4 - 6), Severe Pain (7 - 10)  albuterol    90 MICROgram(s) HFA Inhaler 2 Puff(s) Inhalation every 6 hours PRN for bronchospasm      Allergies  penicillin (Short breath)  eggs (Short breath)      Review of Systems:   Cardiovascular:  No Chest Pain, No Palpitations  Respiratory:  No Cough, No Dyspnea  Gastrointestinal:  As described in HPI  Skin:  No Skin Lesions, No Jaundice  Neuro:  No Syncope, No Dizziness    Physical Examination:  T(C): 36.6 (24 @ 12:07), Max: 36.9 (24 @ 05:09)  HR: 66 (24 @ 12:07) (66 - 79)  BP: 151/78 (24 @ 12:07) (119/72 - 151/78)  RR: 18 (24 @ 12:07) (18 - 19)  SpO2: 98% (24 @ 12:07) (94% - 98%)      24 @ 07:01  -  24 @ 17:03  --------------------------------------------------------  IN: 0 mL / OUT: 500 mL / NET: -500 mL        GENERAL: AAOx3, no acute distress.  HEAD:  Atraumatic, Normocephalic  EYES: conjunctiva and sclera clear  NECK: Supple, no JVD or thyromegaly  CHEST/LUNG: Clear to auscultation bilaterally; No wheeze, rhonchi, or rales  HEART: Regular rate and rhythm; normal S1, S2, No murmurs.  ABDOMEN: Soft, nontender, nondistended; Bowel sounds present  NEUROLOGY: No asterixis or tremor.   SKIN: Intact, no jaundice     Data:                        9.3    9.46  )-----------( 473      ( 06 May 2024 06:12 )             33.4     Hgb trend:  9.3  24 @ 06:12  8.8  24 @ 08:17            133<L>  |  92<L>  |  8<L>  ----------------------------<  82  5.0   |  31  |  <0.5<L>    Ca    9.9      06 May 2024 06:12  Mg     1.9         TPro  6.4  /  Alb  3.7  /  TBili  <0.2  /  DBili  x   /  AST  13  /  ALT  9   /  AlkPhos  133<H>      Liver panel trend:  TBili <0.2   /   AST 13   /   ALT 9   /   AlkP 133   /   Tptn 6.4   /   Alb 3.7    /   DBili --        TBili <0.2   /   AST 12   /   ALT 10   /   AlkP 132   /   Tptn 6.3   /   Alb 3.7    /   DBili --      05-05  TBili <0.2   /   AST 13   /   ALT 9   /   AlkP 137   /   Tptn 6.2   /   Alb 3.6    /   DBili --      05-04  TBili 0.2   /   AST 15   /   ALT 10   /   AlkP 150   /   Tptn 7.0   /   Alb 3.8    /   DBili --      05-03  TBili <0.2   /   AST 10   /   ALT 8   /   AlkP 131   /   Tptn 6.1   /   Alb 3.4    /   DBili --      05-02  TBili <0.2   /   AST 19   /   ALT 10   /   AlkP 143   /   Tptn 6.0   /   Alb 3.4    /   DBili --      04-30             Radiology:

## 2024-05-07 NOTE — PROGRESS NOTE ADULT - SUBJECTIVE AND OBJECTIVE BOX
pt seen and examined.     My notes supersede resident's notes in case of discrepancy       ROS: no cp, no sob, no n/v, no fever    Vital Signs Last 24 Hrs  T(C): 36.9 (07 May 2024 05:09), Max: 36.9 (07 May 2024 05:09)  T(F): 98.5 (07 May 2024 05:09), Max: 98.5 (07 May 2024 05:09)  HR: 79 (07 May 2024 05:09) (71 - 79)  BP: 145/71 (07 May 2024 05:09) (119/72 - 145/71)  RR: 19 (07 May 2024 05:09) (18 - 19)  SpO2: 94% (07 May 2024 05:09) (94% - 98%)    Parameters below as of 07 May 2024 05:09  Patient On (Oxygen Delivery Method): room air    physical exam  constitutional NAD, AAOX3, Respiratory  lungs CTA, CVS heart RRR, GI: abdomen Soft NT, ND, BS+, skin: intact  neuro exam no focal deficit     MEDICATIONS  (STANDING):  albuterol/ipratropium for Nebulization 3 milliLiter(s) Nebulizer every 6 hours  ARIPiprazole 5 milliGRAM(s) Oral at bedtime  betamethasone valerate 0.1% Cream 1 Application(s) Topical two times a day  budesonide 160 MICROgram(s)/formoterol 4.5 MICROgram(s) Inhaler 2 Puff(s) Inhalation two times a day  clotrimazole 1% Cream 1 Application(s) Topical two times a day  furosemide    Tablet 40 milliGRAM(s) Oral daily  loratadine 10 milliGRAM(s) Oral daily  nicotine -  14 mG/24Hr(s) Patch 1 Patch Transdermal daily  pantoprazole    Tablet 40 milliGRAM(s) Oral every 12 hours  trihexyphenidyl 2 milliGRAM(s) Oral two times a day    MEDICATIONS  (PRN):  acetaminophen     Tablet .. 650 milliGRAM(s) Oral every 6 hours PRN Temp greater or equal to 38C (100.4F), Mild Pain (1 - 3), Moderate Pain (4 - 6), Severe Pain (7 - 10)  albuterol    90 MICROgram(s) HFA Inhaler 2 Puff(s) Inhalation every 6 hours PRN for bronchospasm                        9.3    9.46  )-----------( 473      ( 06 May 2024 06:12 )             33.4     05-06    133<L>  |  92<L>  |  8<L>  ----------------------------<  82  5.0   |  31  |  <0.5<L>    Ca    9.9      06 May 2024 06:12  Mg     1.9     05-06    TPro  6.4  /  Alb  3.7  /  TBili  <0.2  /  DBili  x   /  AST  13  /  ALT  9   /  AlkPhos  133<H>  05-06    Ferritin: 10 ng/mL [13 - 330] (05-01-24 @ 11:29)    a/p    68 years old female with PMH of  htn, hdl, bipolar disorder, copd on 2L home O2, chf, chronic lymphedema, asthma presents to ED with c/o shortness of breath and le redness and swelling. Patient noticed b/l le redness that worsened x1 week. Patient also reports shortness of breath from  several days. Pt denies f/c, chest pain, hemoptysis, vomiting, dark/bloody stool. no ac use.  Patient refused to provide with further details saying she wants to sleep, only allowed me to do brief physical exam, she is not wheezing on auscultation, has b/l lower extremity edema with redness (concerning for venous stasis).    # leg edema, redness, tenderness, cellulitis   no sepsis   cont abx  clinically improving  cont topical therapy    # sob, multifactorial, copd, ( not hypoxic in this admission, O2 94% on room air) hypercapnic, also exacerbated by anemia   possible Acute on chronic HFpEF  pulm notes appreciated  cont diuretics    Nebs Q6 PRN  May benefit from AVAPS QHS and PRN at the following settings:   TV: 385, Epap 6, Imin 10, Imax 20, RR 14, FiO2 35%    pt not compliant with avap     fu echo rule out chf     # anemia   no acute gross bleeding  recent gib and sp egd in [facility]   fu gi , per GI may need repeat egd in this admission   Hemoglobin: 9.3 g/dL (05-06-24 @ 06:12)  Hemoglobin: 8.8 g/dL (05-05-24 @ 08:17)  Hemoglobin: 8.6 g/dL (05-04-24 @ 06:44)  cont venofer    # HTN , HLD, bipolar cont meds     #Progress Note Handoff    Pending : discharge planning   Family discussion: dw pt   Disposition: STR,   code status, full code   time spent 35 min

## 2024-05-07 NOTE — PROGRESS NOTE ADULT - SUBJECTIVE AND OBJECTIVE BOX
Patient is a 68y old  Female who presents with a chief complaint of shortness of breath (07 May 2024 11:31)        SUBJECTIVE:  On room air. Feels well. Afebrile.     REVIEW OF SYSTEMS:    All Pertinent ROS are negative except per HPI       PHYSICAL EXAM  Vital Signs Last 24 Hrs  T(C): 36.6 (07 May 2024 12:07), Max: 36.9 (07 May 2024 05:09)  T(F): 97.9 (07 May 2024 12:07), Max: 98.5 (07 May 2024 05:09)  HR: 66 (07 May 2024 12:07) (66 - 79)  BP: 151/78 (07 May 2024 12:07) (119/72 - 151/78)  BP(mean): --  RR: 18 (07 May 2024 12:07) (18 - 19)  SpO2: 98% (07 May 2024 12:07) (94% - 98%)    Parameters below as of 07 May 2024 12:07  Patient On (Oxygen Delivery Method): room air        CONSTITUTIONAL:  in NAD    ENT:   Airway patent,   No thrush    CARDIAC:   Normal rate,   regular rhythm.    no edema      RESPIRATORY:   NO Wheezing  Normal chest expansion  Not tachypneic,  No use of accessory muscles    GASTROINTESTINAL:  Abdomen soft,   non-tender,   no guarding,   + BS    MUSCULOSKELETAL:   range of motion is not limited,  no clubbing, cyanosis    NEUROLOGICAL:   Alert and oriented   no motor or deficits.    SKIN:   Skin normal color for race,   warm, dry       05-07-24 @ 07:01  -  05-07-24 @ 13:12  --------------------------------------------------------  IN:  Total IN: 0 mL    OUT:    Voided (mL): 500 mL  Total OUT: 500 mL    Total NET: -500 mL          LABS:                          9.3    9.46  )-----------( 473      ( 06 May 2024 06:12 )             33.4                                               05-06    133<L>  |  92<L>  |  8<L>  ----------------------------<  82  5.0   |  31  |  <0.5<L>    Ca    9.9      06 May 2024 06:12  Mg     1.9     05-06    TPro  6.4  /  Alb  3.7  /  TBili  <0.2  /  DBili  x   /  AST  13  /  ALT  9   /  AlkPhos  133<H>  05-06                                             Urinalysis Basic - ( 06 May 2024 06:12 )    Color: x / Appearance: x / SG: x / pH: x  Gluc: 82 mg/dL / Ketone: x  / Bili: x / Urobili: x   Blood: x / Protein: x / Nitrite: x   Leuk Esterase: x / RBC: x / WBC x   Sq Epi: x / Non Sq Epi: x / Bacteria: x                                                  LIVER FUNCTIONS - ( 06 May 2024 06:12 )  Alb: 3.7 g/dL / Pro: 6.4 g/dL / ALK PHOS: 133 U/L / ALT: 9 U/L / AST: 13 U/L / GGT: x                                                                                                MEDICATIONS  (STANDING):  albuterol/ipratropium for Nebulization 3 milliLiter(s) Nebulizer every 6 hours  ARIPiprazole 5 milliGRAM(s) Oral at bedtime  betamethasone valerate 0.1% Cream 1 Application(s) Topical two times a day  budesonide 160 MICROgram(s)/formoterol 4.5 MICROgram(s) Inhaler 2 Puff(s) Inhalation two times a day  clotrimazole 1% Cream 1 Application(s) Topical two times a day  furosemide    Tablet 40 milliGRAM(s) Oral daily  loratadine 10 milliGRAM(s) Oral daily  nicotine -  14 mG/24Hr(s) Patch 1 Patch Transdermal daily  pantoprazole    Tablet 40 milliGRAM(s) Oral every 12 hours  trihexyphenidyl 2 milliGRAM(s) Oral two times a day    MEDICATIONS  (PRN):  acetaminophen     Tablet .. 650 milliGRAM(s) Oral every 6 hours PRN Temp greater or equal to 38C (100.4F), Mild Pain (1 - 3), Moderate Pain (4 - 6), Severe Pain (7 - 10)  albuterol    90 MICROgram(s) HFA Inhaler 2 Puff(s) Inhalation every 6 hours PRN for bronchospasm      X-Rays reviewed    CXR interpreted by me:

## 2024-05-07 NOTE — PROGRESS NOTE ADULT - ASSESSMENT
Impression:     Chronic hypercapnic respiratory failure   COPD on 2L home O2   Likely LATISHA/OHS   Bipolar disorder   Chronic lymphedema   Abdominal hernia   Active smoker     Recommendations:     Patient used to see Dr. Iglesias but states she no longer wants to see her [facility] physicians.   Wean O2 as tolerated, target SpO2 88-92%  VBG noted with chronic hypercapnia   Nebs Q6 PRN  Start stiolto. Can DC on stiolto OR any LABA/LAMA covered by insurance.   May benefit from AVAPS QHS and PRN at the following settings:   TV: 385, Epap 6, Imin 10, Imax 20, RR 14, FiO2 35%  Willing to try NIV; please document compliance for patient  OP pulmonary follow up at [facility] clinic for PFTs and LDCT screening   Smoking cessation   Poor prognosis     Intermediate risk for endoscopic procedure. No further work up to be done at this time. Will benefit from NIV post procedure.

## 2024-05-08 LAB
ALBUMIN SERPL ELPH-MCNC: 3.9 G/DL — SIGNIFICANT CHANGE UP (ref 3.5–5.2)
ALP SERPL-CCNC: 127 U/L — HIGH (ref 30–115)
ALT FLD-CCNC: 10 U/L — SIGNIFICANT CHANGE UP (ref 0–41)
ANION GAP SERPL CALC-SCNC: 9 MMOL/L — SIGNIFICANT CHANGE UP (ref 7–14)
AST SERPL-CCNC: 15 U/L — SIGNIFICANT CHANGE UP (ref 0–41)
BASOPHILS # BLD AUTO: 0.07 K/UL — SIGNIFICANT CHANGE UP (ref 0–0.2)
BASOPHILS NFR BLD AUTO: 0.9 % — SIGNIFICANT CHANGE UP (ref 0–1)
BILIRUB SERPL-MCNC: 0.2 MG/DL — SIGNIFICANT CHANGE UP (ref 0.2–1.2)
BUN SERPL-MCNC: 11 MG/DL — SIGNIFICANT CHANGE UP (ref 10–20)
CALCIUM SERPL-MCNC: 9.6 MG/DL — SIGNIFICANT CHANGE UP (ref 8.4–10.5)
CHLORIDE SERPL-SCNC: 95 MMOL/L — LOW (ref 98–110)
CO2 SERPL-SCNC: 34 MMOL/L — HIGH (ref 17–32)
CREAT SERPL-MCNC: 0.6 MG/DL — LOW (ref 0.7–1.5)
EGFR: 98 ML/MIN/1.73M2 — SIGNIFICANT CHANGE UP
EOSINOPHIL # BLD AUTO: 0.26 K/UL — SIGNIFICANT CHANGE UP (ref 0–0.7)
EOSINOPHIL NFR BLD AUTO: 3.3 % — SIGNIFICANT CHANGE UP (ref 0–8)
GLUCOSE SERPL-MCNC: 95 MG/DL — SIGNIFICANT CHANGE UP (ref 70–99)
HCT VFR BLD CALC: 33.3 % — LOW (ref 37–47)
HGB BLD-MCNC: 9.2 G/DL — LOW (ref 12–16)
IMM GRANULOCYTES NFR BLD AUTO: 2.5 % — HIGH (ref 0.1–0.3)
LYMPHOCYTES # BLD AUTO: 1.52 K/UL — SIGNIFICANT CHANGE UP (ref 1.2–3.4)
LYMPHOCYTES # BLD AUTO: 19.4 % — LOW (ref 20.5–51.1)
MAGNESIUM SERPL-MCNC: 2.2 MG/DL — SIGNIFICANT CHANGE UP (ref 1.8–2.4)
MCHC RBC-ENTMCNC: 20.5 PG — LOW (ref 27–31)
MCHC RBC-ENTMCNC: 27.6 G/DL — LOW (ref 32–37)
MCV RBC AUTO: 74.2 FL — LOW (ref 81–99)
MONOCYTES # BLD AUTO: 0.87 K/UL — HIGH (ref 0.1–0.6)
MONOCYTES NFR BLD AUTO: 11.1 % — HIGH (ref 1.7–9.3)
NEUTROPHILS # BLD AUTO: 4.93 K/UL — SIGNIFICANT CHANGE UP (ref 1.4–6.5)
NEUTROPHILS NFR BLD AUTO: 62.8 % — SIGNIFICANT CHANGE UP (ref 42.2–75.2)
NRBC # BLD: 0 /100 WBCS — SIGNIFICANT CHANGE UP (ref 0–0)
PLATELET # BLD AUTO: 361 K/UL — SIGNIFICANT CHANGE UP (ref 130–400)
PMV BLD: 9 FL — SIGNIFICANT CHANGE UP (ref 7.4–10.4)
POTASSIUM SERPL-MCNC: 5 MMOL/L — SIGNIFICANT CHANGE UP (ref 3.5–5)
POTASSIUM SERPL-SCNC: 5 MMOL/L — SIGNIFICANT CHANGE UP (ref 3.5–5)
PROT SERPL-MCNC: 6.3 G/DL — SIGNIFICANT CHANGE UP (ref 6–8)
RBC # BLD: 4.49 M/UL — SIGNIFICANT CHANGE UP (ref 4.2–5.4)
RBC # FLD: 25.2 % — HIGH (ref 11.5–14.5)
SODIUM SERPL-SCNC: 138 MMOL/L — SIGNIFICANT CHANGE UP (ref 135–146)
WBC # BLD: 7.85 K/UL — SIGNIFICANT CHANGE UP (ref 4.8–10.8)
WBC # FLD AUTO: 7.85 K/UL — SIGNIFICANT CHANGE UP (ref 4.8–10.8)

## 2024-05-08 PROCEDURE — 99232 SBSQ HOSP IP/OBS MODERATE 35: CPT

## 2024-05-08 PROCEDURE — 99233 SBSQ HOSP IP/OBS HIGH 50: CPT

## 2024-05-08 RX ADMIN — Medication 3 MILLILITER(S): at 08:36

## 2024-05-08 RX ADMIN — Medication 2 MILLIGRAM(S): at 17:32

## 2024-05-08 RX ADMIN — Medication 1 PATCH: at 12:26

## 2024-05-08 RX ADMIN — Medication 1 APPLICATION(S): at 05:30

## 2024-05-08 RX ADMIN — ARIPIPRAZOLE 5 MILLIGRAM(S): 15 TABLET ORAL at 21:03

## 2024-05-08 RX ADMIN — Medication 2 MILLIGRAM(S): at 05:35

## 2024-05-08 RX ADMIN — Medication 1 PATCH: at 12:29

## 2024-05-08 RX ADMIN — CHLORHEXIDINE GLUCONATE 1 APPLICATION(S): 213 SOLUTION TOPICAL at 12:38

## 2024-05-08 RX ADMIN — Medication 1 PATCH: at 12:27

## 2024-05-08 RX ADMIN — Medication 1 APPLICATION(S): at 17:33

## 2024-05-08 RX ADMIN — Medication 650 MILLIGRAM(S): at 21:03

## 2024-05-08 RX ADMIN — Medication 1 PATCH: at 19:45

## 2024-05-08 RX ADMIN — Medication 40 MILLIGRAM(S): at 06:43

## 2024-05-08 RX ADMIN — BUDESONIDE AND FORMOTEROL FUMARATE DIHYDRATE 2 PUFF(S): 160; 4.5 AEROSOL RESPIRATORY (INHALATION) at 08:18

## 2024-05-08 RX ADMIN — Medication 650 MILLIGRAM(S): at 22:03

## 2024-05-08 RX ADMIN — LORATADINE 10 MILLIGRAM(S): 10 TABLET ORAL at 12:28

## 2024-05-08 RX ADMIN — Medication 3 MILLILITER(S): at 13:09

## 2024-05-08 RX ADMIN — BUDESONIDE AND FORMOTEROL FUMARATE DIHYDRATE 2 PUFF(S): 160; 4.5 AEROSOL RESPIRATORY (INHALATION) at 20:13

## 2024-05-08 RX ADMIN — PANTOPRAZOLE SODIUM 40 MILLIGRAM(S): 20 TABLET, DELAYED RELEASE ORAL at 06:43

## 2024-05-08 RX ADMIN — PANTOPRAZOLE SODIUM 40 MILLIGRAM(S): 20 TABLET, DELAYED RELEASE ORAL at 17:32

## 2024-05-08 RX ADMIN — ALBUTEROL 2 PUFF(S): 90 AEROSOL, METERED ORAL at 05:42

## 2024-05-08 RX ADMIN — Medication 3 MILLILITER(S): at 20:01

## 2024-05-08 NOTE — PROGRESS NOTE ADULT - ASSESSMENT
68 years old female with PMH of  htn, hld, bipolar disorder, copd on 2L home O2, chf, chronic lipedema, asthma presents to ED with c/o shortness of breath and le redness and swelling.    #Shortness of breath secondary possible acute  CHF HFpEF, (orthopnea and no wheezing) tirggered by new worsening microcytic anemia   #hx of COPD   * on 2L home O2, but now refusing to use  oxygen at hospital (satting 92%)  * pt reported that she had bleeding from PUD but not clear if any intervention was done. Need to get records from [facility] .   - Patient was recently admitted to [facility] for hematemesis.  Records from [facility] reviewed. EGD 5/2/24 report: bleeding duodenal ulcer in second portion of duodenum, s/p clipping.  - VBG:  pH: 7.31, PCO2: 69, HCO3: 35. might be obesity hypoventilation   -  ECG :TWI V1, V2                     - CXR:  Interstitial infiltrate. BNP not that elevated but pt is obese            -LUNA: negative   - Cont  lasix 40mg IV bid . Might add spironolactone if K<5   - PPI IV bid   - Trop negative *2   - Oxygen protocol now on 3 liter   - Start venofer 200mg IV d:1 (Should be safe 24 hour after abs coverage)   - GI consult appreciated : - will recommend EGD prior to discharge when clinically optimized, please obtain pulmonary risk stratification for endoscopy.   - s/p aztreonem, flagyl, solumedrol and nebs in ED for COPD exacerbation     ##Possible  Lower extremity cellulitis Left > Right in a pt with lipedema + stasis dermatitis   * Pt with baseline lipedema cuff sign  noted   - Cont doxycycline d:2   - Leonidas the erythema area  - Keep left leg elevated  - venous duplex:WNL   - Steroid  cream bid       #Hypercapnia likely due to obesity hypoventilation syndrome   - Repeat ABG:   - Pulmonology following, recommends Bipap during sleep     #Abdomen hernia  - Patient complains of pain around ventral abdomen hernia  - o/e: local abdomen tenderness  - fu CT abdomen       DVT prophylaxis: LOVENOX   Activity:  Diet: DASH  Dispo:  Code: FULL

## 2024-05-08 NOTE — PROGRESS NOTE ADULT - ASSESSMENT
68 years old female with PMH of  htn, hdl, bipolar disorder, copd on 2L home O2, chf, chronic lymphedema, asthma, sp hernia repair presents to ED with c/o shortness of breath and LE erythema and swelling x2 weeks. GI consulted for acute on chronic anemia.     #Acute microcytic anemia with NARCISA - no overt GI bleed  - Hemodynamically stable & no active bleeding  - Hemoglobin on admission - 7.6, MCV 70.9 > 8.8. No transfusion   - iron: 19, %sat: 6, TIBC: 332  - patient is on PO iron therapy  - not on AC  - Refused LUNA  - EGD 2/5/24 at [facility] by  for hematemesis, source could not be identified clearly , clip was placed in the duodenum for possible IR GDA embolization, patient does not remember any IR embolization done  CT A/P noted, large ventral hernia, pelvic lymphadenopathy     #Rec  - EGD tomorrow   - NPO after midnight   - Pulmonary recs noted   - c/w pantoprazole 40 PO BID  - will recommend outpatient workup for colonoscopy pending surgery evaluation for hernia  - Miralax daily  - Maintain active Type and screen  - c/w iron per primary team  - Please avoid any NSAIDs  - monitor BMs    #Elevated ALP  GGT 52     RECS  - ALP isoenzymes  - trend LFTs

## 2024-05-08 NOTE — PROGRESS NOTE ADULT - SUBJECTIVE AND OBJECTIVE BOX
Gastroenterology progress note:     Patient is a 68y old  Female who presents with a chief complaint of shortness of breath (07 May 2024 17:03)       Admitted on: 24    We are following the patient for anemia      no overnight events   EGD tomorrow       PAST MEDICAL & SURGICAL HISTORY:  COPD (chronic obstructive pulmonary disease)      Asthma      Lymphedema      Bipolar disorder      CHF (congestive heart failure)      History of hernia repair      History of D&C      H/O  section          MEDICATIONS  (STANDING):  albuterol/ipratropium for Nebulization 3 milliLiter(s) Nebulizer every 6 hours  ARIPiprazole 5 milliGRAM(s) Oral at bedtime  betamethasone valerate 0.1% Cream 1 Application(s) Topical two times a day  budesonide 160 MICROgram(s)/formoterol 4.5 MICROgram(s) Inhaler 2 Puff(s) Inhalation two times a day  chlorhexidine 2% Cloths 1 Application(s) Topical daily  clotrimazole 1% Cream 1 Application(s) Topical two times a day  furosemide    Tablet 40 milliGRAM(s) Oral daily  loratadine 10 milliGRAM(s) Oral daily  nicotine -  14 mG/24Hr(s) Patch 1 Patch Transdermal daily  pantoprazole    Tablet 40 milliGRAM(s) Oral every 12 hours  polyethylene glycol 3350 17 Gram(s) Oral daily  tiotropium 2.5 MICROgram(s)/olodaterol 2.5 MICROgram(s) (STIOLTO) Inhaler 2 Puff(s) Inhalation daily  trihexyphenidyl 2 milliGRAM(s) Oral two times a day    MEDICATIONS  (PRN):  acetaminophen     Tablet .. 650 milliGRAM(s) Oral every 6 hours PRN Temp greater or equal to 38C (100.4F), Mild Pain (1 - 3), Moderate Pain (4 - 6), Severe Pain (7 - 10)  albuterol    90 MICROgram(s) HFA Inhaler 2 Puff(s) Inhalation every 6 hours PRN for bronchospasm      Allergies  penicillin (Short breath)  eggs (Short breath)      Review of Systems:   Cardiovascular:  No Chest Pain, No Palpitations  Respiratory:  No Cough, No Dyspnea  Gastrointestinal:  As described in HPI  Skin:  No Skin Lesions, No Jaundice  Neuro:  No Syncope, No Dizziness    Physical Examination:  T(C): 36.4 (24 @ 05:14), Max: 36.6 (24 @ 12:07)  HR: 57 (24 @ 05:14) (57 - 72)  BP: 137/72 (24 @ 05:14) (137/72 - 151/78)  RR: 19 (24 @ 05:14) (18 - 19)  SpO2: 99% (24 @ 05:14) (92% - 99%)      24 @ 07:01  -  24 @ 07:00  --------------------------------------------------------  IN: 0 mL / OUT: 500 mL / NET: -500 mL        GENERAL: AAOx3, no acute distress.  HEAD:  Atraumatic, Normocephalic  EYES: conjunctiva and sclera clear  NECK: Supple, no JVD or thyromegaly  CHEST/LUNG: Clear to auscultation bilaterally; No wheeze, rhonchi, or rales  HEART: Regular rate and rhythm; normal S1, S2, No murmurs.  ABDOMEN: Soft, nontender, nondistended; Bowel sounds present  NEUROLOGY: No asterixis or tremor.   SKIN: Intact, no jaundice     Data:                        9.2    7.85  )-----------( 361      ( 08 May 2024 06:41 )             33.3     Hgb trend:  9.2  24 @ 06:41  9.3  24 @ 06:12            138  |  95<L>  |  11  ----------------------------<  95  5.0   |  34<H>  |  0.6<L>    Ca    9.6      08 May 2024 06:41  Mg     2.2         TPro  6.3  /  Alb  3.9  /  TBili  0.2  /  DBili  x   /  AST  15  /  ALT  10  /  AlkPhos  127<H>      Liver panel trend:  TBili 0.2   /   AST 15   /   ALT 10   /   AlkP 127   /   Tptn 6.3   /   Alb 3.9    /   DBili --      05-08  TBili <0.2   /   AST 13   /   ALT 9   /   AlkP 133   /   Tptn 6.4   /   Alb 3.7    /   DBili --      05-06  TBili <0.2   /   AST 12   /   ALT 10   /   AlkP 132   /   Tptn 6.3   /   Alb 3.7    /   DBili --      05-05  TBili <0.2   /   AST 13   /   ALT 9   /   AlkP 137   /   Tptn 6.2   /   Alb 3.6    /   DBili --      05-04  TBili 0.2   /   AST 15   /   ALT 10   /   AlkP 150   /   Tptn 7.0   /   Alb 3.8    /   DBili --      05-03  TBili <0.2   /   AST 10   /   ALT 8   /   AlkP 131   /   Tptn 6.1   /   Alb 3.4    /   DBili --      05-02  TBili <0.2   /   AST 19   /   ALT 10   /   AlkP 143   /   Tptn 6.0   /   Alb 3.4    /   DBili --      -             Radiology:

## 2024-05-08 NOTE — PROGRESS NOTE ADULT - SUBJECTIVE AND OBJECTIVE BOX
BULMARO MARIA EUGENIA  68y  Female      Patient is a 68y old  Female who presents with a chief complaint of shortness of breath .  INTERVAL HPI/OVERNIGHT EVENTS:      ******************************* REVIEW OF SYSTEMS:**********************************************    All other review of systems negative    *********************** VITALS ******************************************    T(F): 97.5 (05-08-24 @ 05:14)  HR: 57 (05-08-24 @ 05:14) (57 - 72)  BP: 137/72 (05-08-24 @ 05:14) (137/72 - 146/69)  RR: 19 (05-08-24 @ 05:14) (18 - 19)  SpO2: 99% (05-08-24 @ 05:14) (92% - 99%)    05-07-24 @ 07:01  -  05-08-24 @ 07:00  --------------------------------------------------------  IN: 0 mL / OUT: 500 mL / NET: -500 mL            05-07-24 @ 07:01  -  05-08-24 @ 07:00  --------------------------------------------------------  IN: 0 mL / OUT: 500 mL / NET: -500 mL        ******************************** PHYSICAL EXAM:**************************************************  GENERAL: NAD    PSYCH: no agitation, baseline mentation  HEENT:     NERVOUS SYSTEM:  Alert & Oriented X3,   PULMONARY: CHARLES, CTA    CARDIOVASCULAR: S1S2 RRR    GI: Soft, NT, ND; BS present.    EXTREMITIES:  2+ Peripheral Pulses, No clubbing, cyanosis,  LYMPH: No lymphadenopathy noted    SKIN: No rashes or lesions      **************************** LABS *******************************************************                          9.2    7.85  )-----------( 361      ( 08 May 2024 06:41 )             33.3     05-08    138  |  95<L>  |  11  ----------------------------<  95  5.0   |  34<H>  |  0.6<L>    Ca    9.6      08 May 2024 06:41  Mg     2.2     05-08    TPro  6.3  /  Alb  3.9  /  TBili  0.2  /  DBili  x   /  AST  15  /  ALT  10  /  AlkPhos  127<H>  05-08      Urinalysis Basic - ( 08 May 2024 06:41 )    Color: x / Appearance: x / SG: x / pH: x  Gluc: 95 mg/dL / Ketone: x  / Bili: x / Urobili: x   Blood: x / Protein: x / Nitrite: x   Leuk Esterase: x / RBC: x / WBC x   Sq Epi: x / Non Sq Epi: x / Bacteria: x        Lactate Trend        CAPILLARY BLOOD GLUCOSE              **************************Active Medications *******************************************  penicillin (Short breath)  eggs (Short breath)      acetaminophen     Tablet .. 650 milliGRAM(s) Oral every 6 hours PRN  albuterol    90 MICROgram(s) HFA Inhaler 2 Puff(s) Inhalation every 6 hours PRN  albuterol/ipratropium for Nebulization 3 milliLiter(s) Nebulizer every 6 hours  ARIPiprazole 5 milliGRAM(s) Oral at bedtime  betamethasone valerate 0.1% Cream 1 Application(s) Topical two times a day  budesonide 160 MICROgram(s)/formoterol 4.5 MICROgram(s) Inhaler 2 Puff(s) Inhalation two times a day  chlorhexidine 2% Cloths 1 Application(s) Topical daily  clotrimazole 1% Cream 1 Application(s) Topical two times a day  furosemide    Tablet 40 milliGRAM(s) Oral daily  loratadine 10 milliGRAM(s) Oral daily  nicotine -  14 mG/24Hr(s) Patch 1 Patch Transdermal daily  pantoprazole    Tablet 40 milliGRAM(s) Oral every 12 hours  polyethylene glycol 3350 17 Gram(s) Oral daily  tiotropium 2.5 MICROgram(s)/olodaterol 2.5 MICROgram(s) (STIOLTO) Inhaler 2 Puff(s) Inhalation daily  trihexyphenidyl 2 milliGRAM(s) Oral two times a day      ***************************************************  RADIOLOGY & ADDITIONAL TESTS:    Imaging Personally Reviewed:  [ ] YES  [ ] NO    HEALTH ISSUES - PROBLEM Dx:

## 2024-05-08 NOTE — PROGRESS NOTE ADULT - ASSESSMENT
68 years old female with PMH of  htn, hld, bipolar disorder, copd on 2L home O2, chf, chronic lipedema, asthma presents to ED with c/o shortness of breath and le redness and swelling.    # Acute  HFpEF likely  triggered by worsening microcytic anemia   #hx of COPD   * on 2L home O2, but now refusing to use  oxygen at hospital (satting 92%)  * pt reported that she had bleeding from PUD but not clear if any intervention was done. Need to get records from [facility] .   - Patient was recently admitted to [facility] for hematemesis.  Records from [facility] reviewed. EGD 5/2/24 report: bleeding duodenal ulcer in second portion of duodenum, s/p clipping.  - VBG:  pH: 7.31, PCO2: 69, HCO3: 35. might be obesity hypoventilation   -  ECG :TWI V1, V2 - CXR:  Interstitial infiltrate. -LUNA: negative   - s/p lasix 40mg IV bid  >> now on PO. Might add spironolactone if K<5   - PPI IV bid   - Trop negative *2   - Oxygen protocol now on 3 liter   - S/p  venofer 200mg IV daily x 5 days.   - GI consult appreciated >> awaiting EGD tomorrow.   - s/p aztreonem, flagyl, solumedrol and nebs in ED for COPD exacerbation     ##Possible  Lower extremity cellulitis Left > Right in a pt with lipedema + stasis dermatitis   * Pt with baseline lipedema cuff sign  noted   - s/p  doxycycline x 7 days.   - Keep left leg elevated  - venous duplex:WNL     #Hypercapnia likely due to obesity hypoventilation syndrome   - Pulmonology following, recommends Bipap during sleep   - repeat ABG     #Abdomen hernia  - Patient complains of pain around ventral abdomen hernia  - f/u Surgery as outpt.     DVT prophylaxis: LOVENOX   Activity:  Diet: DASH  Dispo:  Code: FULL    Pending: EGD  Plan d/w the patient at bedside.  Dispo: STR

## 2024-05-08 NOTE — PROGRESS NOTE ADULT - SUBJECTIVE AND OBJECTIVE BOX
24H events:    Patient is a 68y old Female who presents with a chief complaint of shortness of breath (08 May 2024 09:40)    Primary diagnosis of COPD exacerbation    Today is hospital day 8d. This morning patient was seen and examined at bedside, resting comfortably in bed.    No acute or major events overnight.    Code Status:    Family communication:  Contact date:  Name of person contacted:  Relationship to patient:  Communication details:  What matters most:    PAST MEDICAL & SURGICAL HISTORY  COPD (chronic obstructive pulmonary disease)    Asthma    Lymphedema    Bipolar disorder    CHF (congestive heart failure)    History of hernia repair    History of D&C    H/O  section      SOCIAL HISTORY:  Social History:      ALLERGIES:  penicillin (Short breath)  eggs (Short breath)    MEDICATIONS:  STANDING MEDICATIONS  albuterol/ipratropium for Nebulization 3 milliLiter(s) Nebulizer every 6 hours  ARIPiprazole 5 milliGRAM(s) Oral at bedtime  betamethasone valerate 0.1% Cream 1 Application(s) Topical two times a day  budesonide 160 MICROgram(s)/formoterol 4.5 MICROgram(s) Inhaler 2 Puff(s) Inhalation two times a day  chlorhexidine 2% Cloths 1 Application(s) Topical daily  clotrimazole 1% Cream 1 Application(s) Topical two times a day  furosemide    Tablet 40 milliGRAM(s) Oral daily  loratadine 10 milliGRAM(s) Oral daily  nicotine -  14 mG/24Hr(s) Patch 1 Patch Transdermal daily  pantoprazole    Tablet 40 milliGRAM(s) Oral every 12 hours  polyethylene glycol 3350 17 Gram(s) Oral daily  tiotropium 2.5 MICROgram(s)/olodaterol 2.5 MICROgram(s) (STIOLTO) Inhaler 2 Puff(s) Inhalation daily  trihexyphenidyl 2 milliGRAM(s) Oral two times a day    PRN MEDICATIONS  acetaminophen     Tablet .. 650 milliGRAM(s) Oral every 6 hours PRN  albuterol    90 MICROgram(s) HFA Inhaler 2 Puff(s) Inhalation every 6 hours PRN    VITALS:   T(F): 97.5  HR: 57  BP: 137/72  RR: 19  SpO2: 99%    PHYSICAL EXAM:    GENERAL: NAD, well-groomed, well-developed  NERVOUS SYSTEM:  Alert & Oriented X3,   PULM: Clear to auscultation bilaterally, faint wheezing   CARDIAC: Regular rate and rhythm; No murmurs, rubs, or gallops  GI: Soft, Nontender, Nondistended; Bowel sounds present  EXTREMITIES: Lipedema, erythema bilateral ( improved)  L>R         (  ) Indwelling Segundo Catheter:   Date insterted:    Reason (  ) Critical illness     (  ) urinary retention    (  ) Accurate Ins/Outs Monitoring     (  ) CMO patient    (  ) Central Line:   Date inserted:  Location: (  ) Right IJ     (  ) Left IJ     (  ) Right Fem     (  ) Left Fem    (  ) SPC        (  ) pigtail       (  ) PEG tube       (  ) colostomy       (  ) jejunostomy  (  ) U-Dall    LABS:                        9.2    7.85  )-----------( 361      ( 08 May 2024 06:41 )             33.3     05-    138  |  95<L>  |  11  ----------------------------<  95  5.0   |  34<H>  |  0.6<L>    Ca    9.6      08 May 2024 06:41  Mg     2.2     05-08    TPro  6.3  /  Alb  3.9  /  TBili  0.2  /  DBili  x   /  AST  15  /  ALT  10  /  AlkPhos  127<H>  05-08      Urinalysis Basic - ( 08 May 2024 06:41 )    Color: x / Appearance: x / SG: x / pH: x  Gluc: 95 mg/dL / Ketone: x  / Bili: x / Urobili: x   Blood: x / Protein: x / Nitrite: x   Leuk Esterase: x / RBC: x / WBC x   Sq Epi: x / Non Sq Epi: x / Bacteria: x                RADIOLOGY:

## 2024-05-09 ENCOUNTER — TRANSCRIPTION ENCOUNTER (OUTPATIENT)
Age: 69
End: 2024-05-09

## 2024-05-09 ENCOUNTER — RESULT REVIEW (OUTPATIENT)
Age: 69
End: 2024-05-09

## 2024-05-09 LAB
ALBUMIN SERPL ELPH-MCNC: 3.8 G/DL — SIGNIFICANT CHANGE UP (ref 3.5–5.2)
ALP SERPL-CCNC: 123 U/L — HIGH (ref 30–115)
ALT FLD-CCNC: 10 U/L — SIGNIFICANT CHANGE UP (ref 0–41)
ANION GAP SERPL CALC-SCNC: 10 MMOL/L — SIGNIFICANT CHANGE UP (ref 7–14)
APTT BLD: 32.4 SEC — SIGNIFICANT CHANGE UP (ref 27–39.2)
AST SERPL-CCNC: 14 U/L — SIGNIFICANT CHANGE UP (ref 0–41)
BASOPHILS # BLD AUTO: 0.06 K/UL — SIGNIFICANT CHANGE UP (ref 0–0.2)
BASOPHILS NFR BLD AUTO: 0.8 % — SIGNIFICANT CHANGE UP (ref 0–1)
BILIRUB SERPL-MCNC: 0.2 MG/DL — SIGNIFICANT CHANGE UP (ref 0.2–1.2)
BUN SERPL-MCNC: 13 MG/DL — SIGNIFICANT CHANGE UP (ref 10–20)
CALCIUM SERPL-MCNC: 9.5 MG/DL — SIGNIFICANT CHANGE UP (ref 8.4–10.5)
CHLORIDE SERPL-SCNC: 94 MMOL/L — LOW (ref 98–110)
CO2 SERPL-SCNC: 32 MMOL/L — SIGNIFICANT CHANGE UP (ref 17–32)
CREAT SERPL-MCNC: 0.5 MG/DL — LOW (ref 0.7–1.5)
EGFR: 102 ML/MIN/1.73M2 — SIGNIFICANT CHANGE UP
EOSINOPHIL # BLD AUTO: 0.21 K/UL — SIGNIFICANT CHANGE UP (ref 0–0.7)
EOSINOPHIL NFR BLD AUTO: 2.9 % — SIGNIFICANT CHANGE UP (ref 0–8)
GLUCOSE SERPL-MCNC: 85 MG/DL — SIGNIFICANT CHANGE UP (ref 70–99)
HCT VFR BLD CALC: 33.3 % — LOW (ref 37–47)
HGB BLD-MCNC: 9.3 G/DL — LOW (ref 12–16)
IMM GRANULOCYTES NFR BLD AUTO: 1.3 % — HIGH (ref 0.1–0.3)
INR BLD: 1.01 RATIO — SIGNIFICANT CHANGE UP (ref 0.65–1.3)
LYMPHOCYTES # BLD AUTO: 1.59 K/UL — SIGNIFICANT CHANGE UP (ref 1.2–3.4)
LYMPHOCYTES # BLD AUTO: 22.3 % — SIGNIFICANT CHANGE UP (ref 20.5–51.1)
MAGNESIUM SERPL-MCNC: 2.1 MG/DL — SIGNIFICANT CHANGE UP (ref 1.8–2.4)
MANUAL DIF COMMENT BLD-IMP: SIGNIFICANT CHANGE UP
MCHC RBC-ENTMCNC: 20.8 PG — LOW (ref 27–31)
MCHC RBC-ENTMCNC: 27.9 G/DL — LOW (ref 32–37)
MCV RBC AUTO: 74.3 FL — LOW (ref 81–99)
MONOCYTES # BLD AUTO: 0.6 K/UL — SIGNIFICANT CHANGE UP (ref 0.1–0.6)
MONOCYTES NFR BLD AUTO: 8.4 % — SIGNIFICANT CHANGE UP (ref 1.7–9.3)
NEUTROPHILS # BLD AUTO: 4.58 K/UL — SIGNIFICANT CHANGE UP (ref 1.4–6.5)
NEUTROPHILS NFR BLD AUTO: 64.3 % — SIGNIFICANT CHANGE UP (ref 42.2–75.2)
NRBC # BLD: 0 /100 WBCS — SIGNIFICANT CHANGE UP (ref 0–0)
PLATELET # BLD AUTO: 327 K/UL — SIGNIFICANT CHANGE UP (ref 130–400)
PMV BLD: 8.8 FL — SIGNIFICANT CHANGE UP (ref 7.4–10.4)
POTASSIUM SERPL-MCNC: 4.3 MMOL/L — SIGNIFICANT CHANGE UP (ref 3.5–5)
POTASSIUM SERPL-SCNC: 4.3 MMOL/L — SIGNIFICANT CHANGE UP (ref 3.5–5)
PROT SERPL-MCNC: 6.4 G/DL — SIGNIFICANT CHANGE UP (ref 6–8)
PROTHROM AB SERPL-ACNC: 11.5 SEC — SIGNIFICANT CHANGE UP (ref 9.95–12.87)
RBC # BLD: 4.48 M/UL — SIGNIFICANT CHANGE UP (ref 4.2–5.4)
RBC # FLD: 25.7 % — HIGH (ref 11.5–14.5)
SODIUM SERPL-SCNC: 136 MMOL/L — SIGNIFICANT CHANGE UP (ref 135–146)
WBC # BLD: 7.13 K/UL — SIGNIFICANT CHANGE UP (ref 4.8–10.8)
WBC # FLD AUTO: 7.13 K/UL — SIGNIFICANT CHANGE UP (ref 4.8–10.8)

## 2024-05-09 PROCEDURE — 88312 SPECIAL STAINS GROUP 1: CPT | Mod: 26

## 2024-05-09 PROCEDURE — 99232 SBSQ HOSP IP/OBS MODERATE 35: CPT

## 2024-05-09 PROCEDURE — 43239 EGD BIOPSY SINGLE/MULTIPLE: CPT

## 2024-05-09 PROCEDURE — 88305 TISSUE EXAM BY PATHOLOGIST: CPT | Mod: 26

## 2024-05-09 RX ADMIN — Medication 1 APPLICATION(S): at 05:29

## 2024-05-09 RX ADMIN — PANTOPRAZOLE SODIUM 40 MILLIGRAM(S): 20 TABLET, DELAYED RELEASE ORAL at 17:43

## 2024-05-09 RX ADMIN — ARIPIPRAZOLE 5 MILLIGRAM(S): 15 TABLET ORAL at 21:29

## 2024-05-09 RX ADMIN — Medication 650 MILLIGRAM(S): at 18:43

## 2024-05-09 RX ADMIN — Medication 2 MILLIGRAM(S): at 17:43

## 2024-05-09 RX ADMIN — Medication 1 APPLICATION(S): at 17:38

## 2024-05-09 RX ADMIN — Medication 650 MILLIGRAM(S): at 17:43

## 2024-05-09 RX ADMIN — Medication 1 PATCH: at 21:31

## 2024-05-09 RX ADMIN — Medication 1 PATCH: at 07:44

## 2024-05-09 RX ADMIN — Medication 1 PATCH: at 11:41

## 2024-05-09 RX ADMIN — Medication 1 PATCH: at 10:48

## 2024-05-09 RX ADMIN — Medication 3 MILLILITER(S): at 08:22

## 2024-05-09 RX ADMIN — Medication 3 MILLILITER(S): at 19:39

## 2024-05-09 RX ADMIN — CHLORHEXIDINE GLUCONATE 1 APPLICATION(S): 213 SOLUTION TOPICAL at 11:42

## 2024-05-09 NOTE — PROGRESS NOTE ADULT - SUBJECTIVE AND OBJECTIVE BOX
24H events:    Patient is a 68y old Female who presents with a chief complaint of shortness of breath (09 May 2024 11:22)    Primary diagnosis of COPD exacerbation  Today is hospital day 9d. This morning patient was seen and examined at bedside, resting comfortably in bed.    No acute or major events overnight.    Code Status:    Family communication:  Contact date:  Name of person contacted:  Relationship to patient:  Communication details:  What matters most:    PAST MEDICAL & SURGICAL HISTORY  COPD (chronic obstructive pulmonary disease)    Asthma    Lymphedema    Bipolar disorder    CHF (congestive heart failure)    History of hernia repair    History of D&C    H/O  section      SOCIAL HISTORY:  Social History:      ALLERGIES:  penicillin (Short breath)  eggs (Short breath)    MEDICATIONS:  STANDING MEDICATIONS  albuterol/ipratropium for Nebulization 3 milliLiter(s) Nebulizer every 6 hours  ARIPiprazole 5 milliGRAM(s) Oral at bedtime  betamethasone valerate 0.1% Cream 1 Application(s) Topical two times a day  budesonide 160 MICROgram(s)/formoterol 4.5 MICROgram(s) Inhaler 2 Puff(s) Inhalation two times a day  chlorhexidine 2% Cloths 1 Application(s) Topical daily  clotrimazole 1% Cream 1 Application(s) Topical two times a day  furosemide    Tablet 40 milliGRAM(s) Oral daily  loratadine 10 milliGRAM(s) Oral daily  nicotine -  14 mG/24Hr(s) Patch 1 Patch Transdermal daily  pantoprazole    Tablet 40 milliGRAM(s) Oral every 12 hours  polyethylene glycol 3350 17 Gram(s) Oral daily  tiotropium 2.5 MICROgram(s)/olodaterol 2.5 MICROgram(s) (STIOLTO) Inhaler 2 Puff(s) Inhalation daily  trihexyphenidyl 2 milliGRAM(s) Oral two times a day    PRN MEDICATIONS  acetaminophen     Tablet .. 650 milliGRAM(s) Oral every 6 hours PRN  albuterol    90 MICROgram(s) HFA Inhaler 2 Puff(s) Inhalation every 6 hours PRN    VITALS:   T(F): 97.6  HR: 67  BP: 131/69  RR: 19  SpO2: 96%    PHYSICAL EXAM:      GENERAL: NAD, well-groomed, well-developed  NERVOUS SYSTEM:  Alert & Oriented X3,   PULM: Clear to auscultation bilaterally, faint wheezing   CARDIAC: Regular rate and rhythm; No murmurs, rubs, or gallops  GI: Soft, Nontender, Nondistended; Bowel sounds present  EXTREMITIES: Lipedema, erythema bilateral ( improved)  L>R           (  ) Indwelling Segundo Catheter:   Date insterted:    Reason (  ) Critical illness     (  ) urinary retention    (  ) Accurate Ins/Outs Monitoring     (  ) CMO patient    (  ) Central Line:   Date inserted:  Location: (  ) Right IJ     (  ) Left IJ     (  ) Right Fem     (  ) Left Fem    (  ) SPC        (  ) pigtail       (  ) PEG tube       (  ) colostomy       (  ) jejunostomy  (  ) U-Dall    LABS:                        9.3    7.13  )-----------( 327      ( 09 May 2024 05:36 )             33.3         136  |  94<L>  |  13  ----------------------------<  85  4.3   |  32  |  0.5<L>    Ca    9.5      09 May 2024 05:36  Mg     2.1         TPro  6.4  /  Alb  3.8  /  TBili  0.2  /  DBili  x   /  AST  14  /  ALT  10  /  AlkPhos  123<H>  05    PT/INR - ( 09 May 2024 05:36 )   PT: 11.50 sec;   INR: 1.01 ratio         PTT - ( 09 May 2024 05:36 )  PTT:32.4 sec  Urinalysis Basic - ( 09 May 2024 05:36 )    Color: x / Appearance: x / SG: x / pH: x  Gluc: 85 mg/dL / Ketone: x  / Bili: x / Urobili: x   Blood: x / Protein: x / Nitrite: x   Leuk Esterase: x / RBC: x / WBC x   Sq Epi: x / Non Sq Epi: x / Bacteria: x                RADIOLOGY:

## 2024-05-09 NOTE — CHART NOTE - NSCHARTNOTEFT_GEN_A_CORE
PACU ANESTHESIA ADMISSION NOTE      Procedure:   Post op diagnosis:      ____  Intubated  TV:______       Rate: ______      FiO2: ______    _x___  Patent Airway    _x___  Full return of protective reflexes    _x___  Full recovery from anesthesia / back to baseline status    Vitals:  T(C): 36.3 (05-09-24 @ 12:40), Max: 36.5 (05-08-24 @ 19:55)  HR: 71 (05-09-24 @ 13:41) (67 - 90)  BP: 106/53 (05-09-24 @ 13:41) (106/53 - 145/80)  RR: 16 (05-09-24 @ 13:41) (11 - 19)  SpO2: 98% (05-09-24 @ 13:41) (92% - 100%)    Mental Status:  _x___ Awake   _____ Alert   _____ Drowsy   _____ Sedated    Nausea/Vomiting:  _x___  NO       ______Yes,   See Post - Op Orders         Pain Scale (0-10):  __0___    Treatment: _x___ None    ____ See Post - Op/PCA Orders    Post - Operative Fluids:   __x__ Oral   ____ See Post - Op Orders    Plan: Discharge:   _x___Home       _____Floor     _____Critical Care    _____  Other:_________________    Comments:  No anesthesia issues or complications noted.  Discharge when criteria met.
Patient's record from [facility] were requested yesterday (05/02/2024). Autorization for release of health information signed by patient, faxed to [facility] medical records department from the  office in the ED.  Hospitalization records have still not been received at this time. records requested both by hospitalist and consultant yesterday.
Patient was recently admitted to [facility] for hematemesis.   Records from [facility] reviewed.  EGD 5/2/24 report: bleeding duodenal ulcer in second portion of duodenum, s/p clipping.

## 2024-05-09 NOTE — PROGRESS NOTE ADULT - ASSESSMENT
68 years old female with PMH of  htn, hld, bipolar disorder, copd on 2L home O2, chf, chronic lipedema, asthma presents to ED with c/o shortness of breath and le redness and swelling.    # Acute  HFpEF likely  triggered by worsening microcytic anemia   #hx of COPD   * on 2L home O2, but now refusing to use  oxygen at hospital (satting 92%)  * pt reported that she had bleeding from PUD but not clear if any intervention was done. Need to get records from [facility] .   - Patient was recently admitted to [facility] for hematemesis.  Records from [facility] reviewed. EGD 5/2/24 report: bleeding duodenal ulcer in second portion of duodenum, s/p clipping.  - VBG:  pH: 7.31, PCO2: 69, HCO3: 35. might be obesity hypoventilation   -  ECG :TWI V1, V2 - CXR:  Interstitial infiltrate. -LUNA: negative   - s/p lasix 40mg IV bid  >> now on PO.   - PPI IV bid >>switched to PO   - Trop negative *2  - S/p  venofer 200mg IV daily x 5 days.   - GI consult appreciated >> awaiting EGD today.   - s/p aztreonem, flagyl, solumedrol and nebs in ED for COPD exacerbation     ##Possible  Lower extremity cellulitis Left > Right in a pt with lipedema + stasis dermatitis   * Pt with baseline lipedema cuff sign  noted   - s/p  doxycycline x 7 days.   - Keep left leg elevated  - venous duplex:WNL     #Hypercapnia likely due to obesity hypoventilation syndrome   - Pulmonology following, recommends Bipap during sleep   - repeat ABG     #Abdomen hernia  - Patient complains of pain around ventral abdomen hernia  - f/u Surgery as outpt.     DVT prophylaxis: LOVENOX   Activity:  Diet: DASH  Dispo:  Code: FULL    Pending: EGD  Plan d/w the patient at bedside.  Dispo: STR

## 2024-05-09 NOTE — PROGRESS NOTE ADULT - ASSESSMENT
68 years old female with PMH of  htn, hld, bipolar disorder, copd on 2L home O2, chf, chronic lipedema, asthma presents to ED with c/o shortness of breath and le redness and swelling.    #Shortness of breath secondary possible acute  CHF HFpEF, (orthopnea and no wheezing) tirggered by new worsening microcytic anemia   #hx of COPD   * on 2L home O2, but now refusing to use  oxygen at hospital (satting 92%)  * pt reported that she had bleeding from PUD but not clear if any intervention was done. Need to get records from [facility] .   - Patient was recently admitted to [facility] for hematemesis.  Records from [facility] reviewed. EGD 5/2/24 report: bleeding duodenal ulcer in second portion of duodenum, s/p clipping.  - VBG:  pH: 7.31, PCO2: 69, HCO3: 35. might be obesity hypoventilation   -  ECG :TWI V1, V2                     - CXR:  Interstitial infiltrate. BNP not that elevated but pt is obese            -LUNA: negative   - Cont  lasix 40mg IV bid . Might add spironolactone if K<5   - PPI IV bid   - Trop negative *2   - Oxygen protocol now on 3 liter   - Start venofer 200mg IV d:1 (Should be safe 24 hour after abs coverage)   - GI consult appreciated : - will recommend EGD prior to discharge when clinically optimized, please obtain pulmonary risk stratification for endoscopy. scheduled egd 5/9  - s/p aztreonem, flagyl, solumedrol and nebs in ED for COPD exacerbation     ##Possible  Lower extremity cellulitis Left > Right in a pt with lipedema + stasis dermatitis   * Pt with baseline lipedema cuff sign  noted   - Leonidas the erythema area  - Keep left leg elevated  - venous duplex:WNL   - Steroid  cream bid       #Hypercapnia likely due to obesity hypoventilation syndrome   - Repeat ABG:   - Pulmonology following, recommends Bipap during sleep   - pt refuses avaps    #Abdomen hernia  - Patient complains of pain around ventral abdomen hernia  - o/e: local abdomen tenderness  - fu CT abdomen       DVT prophylaxis: LOVENOX   Activity:  Diet: DASH  Dispo:  Code: FULL

## 2024-05-09 NOTE — PROGRESS NOTE ADULT - SUBJECTIVE AND OBJECTIVE BOX
MARIA EUGENIA CHAMBERLAIN  68y  Female      Patient is a 68y old  Female who presents with a chief complaint of shortness of breath.       INTERVAL HPI/OVERNIGHT EVENTS:      ******************************* REVIEW OF SYSTEMS:**********************************************    All other review of systems negative    *********************** VITALS ******************************************    T(F): 97.6 (05-09-24 @ 04:55)  HR: 67 (05-09-24 @ 04:55) (67 - 90)  BP: 131/69 (05-09-24 @ 04:55) (124/55 - 145/80)  RR: 19 (05-09-24 @ 04:55) (18 - 19)  SpO2: 96% (05-09-24 @ 04:55) (92% - 96%)            ******************************** PHYSICAL EXAM:**************************************************  GENERAL: NAD    PSYCH: no agitation, baseline mentation  HEENT:     NERVOUS SYSTEM:  Alert & Oriented X3,     PULMONARY: CHARLES, CTA    CARDIOVASCULAR: S1S2 RRR    GI: Soft, NT, ND; BS present.    EXTREMITIES:  2+ Peripheral Pulses, No clubbing, cyanosis, or edema    LYMPH: No lymphadenopathy noted    SKIN: No rashes or lesions      **************************** LABS *******************************************************                          9.3    7.13  )-----------( 327      ( 09 May 2024 05:36 )             33.3     05-09    136  |  94<L>  |  13  ----------------------------<  85  4.3   |  32  |  0.5<L>    Ca    9.5      09 May 2024 05:36  Mg     2.1     05-09    TPro  6.4  /  Alb  3.8  /  TBili  0.2  /  DBili  x   /  AST  14  /  ALT  10  /  AlkPhos  123<H>  05-09      Urinalysis Basic - ( 09 May 2024 05:36 )    Color: x / Appearance: x / SG: x / pH: x  Gluc: 85 mg/dL / Ketone: x  / Bili: x / Urobili: x   Blood: x / Protein: x / Nitrite: x   Leuk Esterase: x / RBC: x / WBC x   Sq Epi: x / Non Sq Epi: x / Bacteria: x      PT/INR - ( 09 May 2024 05:36 )   PT: 11.50 sec;   INR: 1.01 ratio         PTT - ( 09 May 2024 05:36 )  PTT:32.4 sec  Lactate Trend        CAPILLARY BLOOD GLUCOSE              **************************Active Medications *******************************************  penicillin (Short breath)  eggs (Short breath)      acetaminophen     Tablet .. 650 milliGRAM(s) Oral every 6 hours PRN  albuterol    90 MICROgram(s) HFA Inhaler 2 Puff(s) Inhalation every 6 hours PRN  albuterol/ipratropium for Nebulization 3 milliLiter(s) Nebulizer every 6 hours  ARIPiprazole 5 milliGRAM(s) Oral at bedtime  betamethasone valerate 0.1% Cream 1 Application(s) Topical two times a day  budesonide 160 MICROgram(s)/formoterol 4.5 MICROgram(s) Inhaler 2 Puff(s) Inhalation two times a day  chlorhexidine 2% Cloths 1 Application(s) Topical daily  clotrimazole 1% Cream 1 Application(s) Topical two times a day  furosemide    Tablet 40 milliGRAM(s) Oral daily  loratadine 10 milliGRAM(s) Oral daily  nicotine -  14 mG/24Hr(s) Patch 1 Patch Transdermal daily  pantoprazole    Tablet 40 milliGRAM(s) Oral every 12 hours  polyethylene glycol 3350 17 Gram(s) Oral daily  tiotropium 2.5 MICROgram(s)/olodaterol 2.5 MICROgram(s) (STIOLTO) Inhaler 2 Puff(s) Inhalation daily  trihexyphenidyl 2 milliGRAM(s) Oral two times a day      ***************************************************  RADIOLOGY & ADDITIONAL TESTS:    Imaging Personally Reviewed:  [ ] YES  [ ] NO    HEALTH ISSUES - PROBLEM Dx:

## 2024-05-10 ENCOUNTER — TRANSCRIPTION ENCOUNTER (OUTPATIENT)
Age: 69
End: 2024-05-10

## 2024-05-10 LAB
ALBUMIN SERPL ELPH-MCNC: 3.9 G/DL — SIGNIFICANT CHANGE UP (ref 3.5–5.2)
ALP SERPL-CCNC: 119 U/L — HIGH (ref 30–115)
ALT FLD-CCNC: 11 U/L — SIGNIFICANT CHANGE UP (ref 0–41)
ANION GAP SERPL CALC-SCNC: 5 MMOL/L — LOW (ref 7–14)
AST SERPL-CCNC: 16 U/L — SIGNIFICANT CHANGE UP (ref 0–41)
BASOPHILS # BLD AUTO: 0.06 K/UL — SIGNIFICANT CHANGE UP (ref 0–0.2)
BASOPHILS NFR BLD AUTO: 0.8 % — SIGNIFICANT CHANGE UP (ref 0–1)
BILIRUB SERPL-MCNC: <0.2 MG/DL — SIGNIFICANT CHANGE UP (ref 0.2–1.2)
BUN SERPL-MCNC: 17 MG/DL — SIGNIFICANT CHANGE UP (ref 10–20)
CALCIUM SERPL-MCNC: 9.7 MG/DL — SIGNIFICANT CHANGE UP (ref 8.4–10.5)
CHLORIDE SERPL-SCNC: 97 MMOL/L — LOW (ref 98–110)
CO2 SERPL-SCNC: 34 MMOL/L — HIGH (ref 17–32)
CREAT SERPL-MCNC: 0.5 MG/DL — LOW (ref 0.7–1.5)
EGFR: 102 ML/MIN/1.73M2 — SIGNIFICANT CHANGE UP
EOSINOPHIL # BLD AUTO: 0.18 K/UL — SIGNIFICANT CHANGE UP (ref 0–0.7)
EOSINOPHIL NFR BLD AUTO: 2.4 % — SIGNIFICANT CHANGE UP (ref 0–8)
GLUCOSE SERPL-MCNC: 96 MG/DL — SIGNIFICANT CHANGE UP (ref 70–99)
HCT VFR BLD CALC: 32.6 % — LOW (ref 37–47)
HGB BLD-MCNC: 9.1 G/DL — LOW (ref 12–16)
IMM GRANULOCYTES NFR BLD AUTO: 1.2 % — HIGH (ref 0.1–0.3)
LYMPHOCYTES # BLD AUTO: 1.45 K/UL — SIGNIFICANT CHANGE UP (ref 1.2–3.4)
LYMPHOCYTES # BLD AUTO: 19.2 % — LOW (ref 20.5–51.1)
MAGNESIUM SERPL-MCNC: 2 MG/DL — SIGNIFICANT CHANGE UP (ref 1.8–2.4)
MCHC RBC-ENTMCNC: 20.9 PG — LOW (ref 27–31)
MCHC RBC-ENTMCNC: 27.9 G/DL — LOW (ref 32–37)
MCV RBC AUTO: 74.9 FL — LOW (ref 81–99)
MONOCYTES # BLD AUTO: 0.69 K/UL — HIGH (ref 0.1–0.6)
MONOCYTES NFR BLD AUTO: 9.2 % — SIGNIFICANT CHANGE UP (ref 1.7–9.3)
NEUTROPHILS # BLD AUTO: 5.07 K/UL — SIGNIFICANT CHANGE UP (ref 1.4–6.5)
NEUTROPHILS NFR BLD AUTO: 67.2 % — SIGNIFICANT CHANGE UP (ref 42.2–75.2)
NRBC # BLD: 0 /100 WBCS — SIGNIFICANT CHANGE UP (ref 0–0)
PLATELET # BLD AUTO: 300 K/UL — SIGNIFICANT CHANGE UP (ref 130–400)
PMV BLD: 9 FL — SIGNIFICANT CHANGE UP (ref 7.4–10.4)
POTASSIUM SERPL-MCNC: 5.1 MMOL/L — HIGH (ref 3.5–5)
POTASSIUM SERPL-SCNC: 5.1 MMOL/L — HIGH (ref 3.5–5)
PROT SERPL-MCNC: 6.3 G/DL — SIGNIFICANT CHANGE UP (ref 6–8)
RBC # BLD: 4.35 M/UL — SIGNIFICANT CHANGE UP (ref 4.2–5.4)
RBC # FLD: 26.1 % — HIGH (ref 11.5–14.5)
SODIUM SERPL-SCNC: 136 MMOL/L — SIGNIFICANT CHANGE UP (ref 135–146)
WBC # BLD: 7.54 K/UL — SIGNIFICANT CHANGE UP (ref 4.8–10.8)
WBC # FLD AUTO: 7.54 K/UL — SIGNIFICANT CHANGE UP (ref 4.8–10.8)

## 2024-05-10 PROCEDURE — 99233 SBSQ HOSP IP/OBS HIGH 50: CPT

## 2024-05-10 PROCEDURE — 99232 SBSQ HOSP IP/OBS MODERATE 35: CPT

## 2024-05-10 RX ORDER — PANTOPRAZOLE SODIUM 20 MG/1
1 TABLET, DELAYED RELEASE ORAL
Qty: 0 | Refills: 0 | DISCHARGE
Start: 2024-05-10

## 2024-05-10 RX ORDER — NICOTINE POLACRILEX 2 MG
1 GUM BUCCAL
Qty: 0 | Refills: 0 | DISCHARGE
Start: 2024-05-10

## 2024-05-10 RX ORDER — ALBUTEROL 90 UG/1
2 AEROSOL, METERED ORAL
Qty: 0 | Refills: 0 | DISCHARGE

## 2024-05-10 RX ADMIN — Medication 650 MILLIGRAM(S): at 18:23

## 2024-05-10 RX ADMIN — ARIPIPRAZOLE 5 MILLIGRAM(S): 15 TABLET ORAL at 21:20

## 2024-05-10 RX ADMIN — Medication 1 PATCH: at 18:03

## 2024-05-10 RX ADMIN — Medication 1 PATCH: at 12:23

## 2024-05-10 RX ADMIN — PANTOPRAZOLE SODIUM 40 MILLIGRAM(S): 20 TABLET, DELAYED RELEASE ORAL at 05:31

## 2024-05-10 RX ADMIN — BUDESONIDE AND FORMOTEROL FUMARATE DIHYDRATE 2 PUFF(S): 160; 4.5 AEROSOL RESPIRATORY (INHALATION) at 09:57

## 2024-05-10 RX ADMIN — Medication 1 APPLICATION(S): at 05:30

## 2024-05-10 RX ADMIN — PANTOPRAZOLE SODIUM 40 MILLIGRAM(S): 20 TABLET, DELAYED RELEASE ORAL at 17:20

## 2024-05-10 RX ADMIN — Medication 40 MILLIGRAM(S): at 05:31

## 2024-05-10 RX ADMIN — LORATADINE 10 MILLIGRAM(S): 10 TABLET ORAL at 12:21

## 2024-05-10 RX ADMIN — Medication 650 MILLIGRAM(S): at 17:23

## 2024-05-10 RX ADMIN — Medication 3 MILLILITER(S): at 23:14

## 2024-05-10 RX ADMIN — Medication 2 MILLIGRAM(S): at 17:20

## 2024-05-10 RX ADMIN — Medication 1 PATCH: at 12:20

## 2024-05-10 RX ADMIN — Medication 1 PATCH: at 05:41

## 2024-05-10 RX ADMIN — Medication 1 APPLICATION(S): at 17:20

## 2024-05-10 RX ADMIN — Medication 3 MILLILITER(S): at 13:33

## 2024-05-10 RX ADMIN — Medication 3 MILLILITER(S): at 08:43

## 2024-05-10 RX ADMIN — CHLORHEXIDINE GLUCONATE 1 APPLICATION(S): 213 SOLUTION TOPICAL at 12:21

## 2024-05-10 RX ADMIN — Medication 2 MILLIGRAM(S): at 05:31

## 2024-05-10 NOTE — DISCHARGE NOTE PROVIDER - NSDCFUSCHEDAPPT_GEN_ALL_CORE_FT
Karley Yarbrough  [facility]  Scheduled Appointment: 06/12/2024    Brandon Kauffman  [facility]  GASTRO  [facility]  Scheduled Appointment: 06/12/2024

## 2024-05-10 NOTE — PROGRESS NOTE ADULT - ASSESSMENT
68 years old female with PMH of  htn, hld, bipolar disorder, copd on 2L home O2, chf, chronic lipedema, asthma presents to ED with c/o shortness of breath and le redness and swelling.    # Acute  HFpEF likely  triggered by worsening microcytic anemia   #hx of COPD   * on 2L home O2, but now refusing to use  oxygen at hospital (satting 92%)  * pt reported that she had bleeding from PUD but not clear if any intervention was done. Need to get records from [facility] .   - Patient was recently admitted to [facility] for hematemesis.  Records [facility] reviewed. EGD 5/2/24 report: bleeding duodenal ulcer in second portion of duodenum, s/p clipping.  - VBG:  pH: 7.31, PCO2: 69, HCO3: 35. might be obesity hypoventilation   -  ECG :TWI V1, V2 - CXR:  Interstitial infiltrate. -LUNA: negative   - s/p lasix 40mg IV bid  >> now on PO.   - PPI IV bid >>switched to PO   - Trop negative *2  - S/p  venofer 200mg IV daily x 5 days.   - GI consult appreciated >> s/p EGD yesterday >> NO ulcers. Non erosive gastritis.   - s/p aztreonem, flagyl, solumedrol and nebs in ED for COPD exacerbation     ##Possible  Lower extremity cellulitis Left > Right in a pt with lipedema + stasis dermatitis   * Pt with baseline lipedema cuff sign  noted   - s/p  doxycycline x 7 days.   - Keep left leg elevated  - venous duplex:WNL     #Hypercapnia likely due to obesity hypoventilation syndrome   - Pulmonology following, recommends Bipap during sleep     #Abdomen hernia  - Patient complains of pain around ventral abdomen hernia  - f/u Surgery as outpt.     DVT prophylaxis: LOVENOX   Activity:  Diet: DASH  Dispo:  Code: FULL    DC planning.   Plan d/w the patient at bedside.  Dispo: STR

## 2024-05-10 NOTE — PROGRESS NOTE ADULT - TIME BILLING
Coordination of care
Electrodesiccation Text: The wound bed was treated with electrodesiccation after the biopsy was performed.

## 2024-05-10 NOTE — DISCHARGE NOTE PROVIDER - CARE PROVIDERS DIRECT ADDRESSES
,DirectAddress_Unknown,[email or web address] ,DirectAddress_Unknown,[email or web address]

## 2024-05-10 NOTE — DISCHARGE NOTE PROVIDER - NSDCMRMEDTOKEN_GEN_ALL_CORE_FT
Abilify 5 mg oral tablet: 1 tab(s) orally once a day (at bedtime)  Albuterol (Eqv-ProAir HFA) 90 mcg/inh inhalation aerosol: 2 puff(s) inhaled every 6 hours as needed for  bronchospasm  ammonium lactate 12% topical cream: Apply topically to affected area 2 times a day as needed for  itching  Anoro Ellipta 62.5 mcg-25 mcg/inh inhalation powder: 1 puff(s) inhaled once a day  Artane 2 mg oral tablet: 2 tab(s) orally 2 times a day  Breo Ellipta 200 mcg-25 mcg/inh inhalation powder: 1 puff(s) inhaled once a day  loratadine 10 mg oral tablet: 1 tab(s) orally once a day  nicotine 14 mg/24 hr transdermal film, extended release: 1 application transdermal once a day  pantoprazole 40 mg oral delayed release tablet: 1 tab(s) orally every 12 hours

## 2024-05-10 NOTE — DISCHARGE NOTE PROVIDER - NSDCFUADDAPPT_GEN_ALL_CORE_FT
Do you need a primary care doctor or follow-up with a specialist? Our care coordinators will help you find providers near you and schedule any follow-up care visits.    Monday-Friday: 9am-5pm    Call our [facility] team: [phone]

## 2024-05-10 NOTE — DISCHARGE NOTE PROVIDER - HOSPITAL COURSE
68 years old female with PMH of  htn, hld, bipolar disorder, copd on 2L home O2, chf, chronic lipedema, asthma presents to ED with c/o shortness of breath and le redness and swelling.    #Shortness of breath secondary possible acute  CHF HFpEF, (orthopnea and no wheezing) tirggered by new worsening microcytic anemia   #hx of COPD   * on 2L home O2, but now refusing to use  oxygen at hospital (satting 92%)  * pt reported that she had bleeding from PUD but not clear if any intervention was done. Need to get records from [facility] .   - Patient was recently admitted to [facility] for hematemesis.  Records from [facility] reviewed. EGD 5/2/24 report: bleeding duodenal ulcer in second portion of duodenum, s/p clipping.  - VBG:  pH: 7.31, PCO2: 69, HCO3: 35. might be obesity hypoventilation   -  ECG :TWI V1, V2                     - CXR:  Interstitial infiltrate. BNP not that elevated but pt is obese            -LUNA: negative   - Cont  lasix 40mg IV bid . Might add spironolactone if K<5   - PPI IV bid   - Trop negative *2   - Oxygen protocol now on 3 liter   - Start venofer 200mg IV d:1 (Should be safe 24 hour after abs coverage)   - GI consult appreciated : - will recommend EGD prior to discharge when clinically optimized, please obtain pulmonary risk stratification for endoscopy. scheduled egd 5/9  - s/p aztreonem, flagyl, solumedrol and nebs in ED for COPD exacerbation     ##Possible  Lower extremity cellulitis Left > Right in a pt with lipedema + stasis dermatitis   * Pt with baseline lipedema cuff sign  noted   - Leonidas the erythema area  - Keep left leg elevated  - venous duplex:WNL   - Steroid  cream bid       #Hypercapnia likely due to obesity hypoventilation syndrome   - Repeat ABG:   - Pulmonology following, recommends Bipap during sleep   - pt refuses avaps    #Abdomen hernia  - Patient complains of pain around ventral abdomen hernia  - o/e: local abdomen tenderness  - fu CT abdomen       Patient is medically stable for discharge.. HPI:  68 years old female with PMH of  htn, hdl, bipolar disorder, copd on 2L home O2, chf, chronic lymphedema, asthma presents to ED with c/o shortness of breath and le redness and swelling. Patient noticed b/l le redness that worsened x1 week. Patient also reports shortness of breath from  several days. Pt denies f/c, chest pain, hemoptysis, vomiting, dark/bloody stool. no ac use.  Patient refused to provide with further details saying she wants to sleep, only allowed me to do brief physical exam, she is not wheezing on auscultation, has b/l lower extremity edema with redness (concerning for venous stasis)    ED course:  T(F): 98  HR: 76   BP: 134/63   RR: 18   SpO2: 92%  on RA    Labs significant for Hb: 7.6 (15.9 in 11/2023),   VBG:  pH: 7.31, PCO2: 69, HCO3: 35  On Exam: chest b/l clear not wheezing  b/l lower extremity edema with redness   Patient received antibiotics (aztreonem and flagyl), nebs, solu mederol in ED    Patient being admitted to medicine for sob and questionable cellulitis.    Floor course:  -Patient treated for CHF exacerbation. IV Lasix weaned to po Lasix.  Patient comfortable on room air.   -Recently at [facility] for hematemesis, at that admission found to have bleeding duodenal ulcer sp clipping. EGD done this admission showed erosive gastritis, no ulcers. Hb stable.   -Patient treated with a course of doxycyline for LE cellulitis.   -Patient is stable and medically cleared for discharge.     A/P:    68 years old female with PMH of  htn, hld, bipolar disorder, copd on 2L home O2, chf, chronic lipedema, asthma presents to ED with c/o shortness of breath and le redness and swelling.    # Acute  HFpEF likely  triggered by worsening microcytic anemia   #hx of COPD   * on 2L home O2, but now refusing to use  oxygen at hospital (satting 92%)  * pt reported that she had bleeding from PUD but not clear if any intervention was done. Need to get records from [facility] .   - Patient was recently admitted to [facility] for hematemesis.  Records from [facility] reviewed. EGD 5/2/24 report: bleeding duodenal ulcer in second portion of duodenum, s/p clipping.  - VBG:  pH: 7.31, PCO2: 69, HCO3: 35. might be obesity hypoventilation   -  ECG :TWI V1, V2 - CXR:  Interstitial infiltrate. -LUNA: negative   - s/p lasix 40mg IV bid  >> now on PO.   - PPI IV bid >>switched to PO   - Trop negative *2  - S/p  venofer 200mg IV daily x 5 days.   - GI consult appreciated >> s/p EGD  >> NO ulcers. Non erosive gastritis.   - s/p aztreonem, flagyl, solumedrol and nebs in ED for COPD exacerbation     ##Possible  Lower extremity cellulitis Left > Right in a pt with lipedema + stasis dermatitis   * Pt with baseline lipedema cuff sign  noted   - s/p  doxycycline x 7 days.   - Keep left leg elevated  - venous duplex:WNL     #Hypercapnia likely due to obesity hypoventilation syndrome   - Pulmonology following, recommends Bipap during sleep     #Abdomen hernia  - Patient complains of pain around ventral abdomen hernia  - f/u Surgery as outpt.     DVT prophylaxis: LOVENOX   Activity:  Diet: DASH  Dispo:  Code: FULL    Pending : Auth   Plan d/w the patient at bedside.  Dispo: STR

## 2024-05-10 NOTE — PROGRESS NOTE ADULT - ATTENDING COMMENTS
I edited the note
Late entry: Ms. Neal was seen and examined again at bedside on 5/7, pulmonary was reengaged for pulmonary risk stratification for endoscopy.  Patient has chronic hypercapnia, COPD, active tobacco abuse, and likely LATISHA.  Due to these multiple comorbidities, the patient is at moderate risk for low risk procedures.  No further workup is indicated at this time, and patient is unlikely to be further optimized prior to procedure.  Recommend continuing with inhalers as documented above, as well as continuing noninvasive ventilation QHS and in the postprocedural arena.  I agree with the fellow note, with the exceptions listed in my attestation above.  The remainder of impression and plan per fellow note.
I edited the note
68 years old female with PMH of  htn, hdl, bipolar disorder, copd on 2L home O2, chf, chronic lymphedema, asthma presents to ED with c/o shortness of breath and le redness and swelling. Patient noticed b/l le redness that worsened x1 week. Patient also reports shortness of breath from  several days. Pt denies f/c, chest pain, hemoptysis, vomiting, dark/bloody stool. no ac use.  Patient refused to provide with further details saying she wants to sleep, only allowed me to do brief physical exam, she is not wheezing on auscultation, has b/l lower extremity edema with redness (concerning for venous stasis).    # leg edema, redness, tenderness  no sepsis   cellulitis,   cont abx  clinically improving    # sob, multifactorial, copd, ( not hypoxic in this admission, O2 94% on room air) hypercapnic, also exacerbated by anemia   possible Acute on chronic HFpEF  pulm notes appreciated    Nebs Q6 PRN  May benefit from AVAPS QHS and PRN at the following settings:   TV: 385, Epap 6, Imin 10, Imax 20, RR 14, FiO2 35%    pt not compliant with avap     fu echo rule out chf     # anemia   no acute gross bleeding  recent gib and sp egd in [facility]   fu gi , per GI may need repeat egd in this admission   Hemoglobin: 8.8 g/dL (05-05-24 @ 08:17)  Hemoglobin: 8.6 g/dL (05-04-24 @ 06:44)  Hemoglobin: 9.4 g/dL (05-03-24 @ 07:48)  Hemoglobin: 7.9 g/dL (05-02-24 @ 11:16)  Hemoglobin: 7.8 g/dL (05-01-24 @ 11:29)    # HTN , HLD, bipolar cont meds     #Progress Note Handoff    Pending :  clinical improvement   Family discussion: jennifer pt   Disposition: home
I edited the note
No overt GI bleeding currently, Hb stable. Prior EGD report reviewed from [facility] 2/2024 showing blood clots suspected duodenal source. Continue PPI BID. Would plan for EGD when medically optimized. Recommend CT imaging and surgery evaluation for hernia.
I edited the note

## 2024-05-10 NOTE — DISCHARGE NOTE PROVIDER - NSDCFUADDINST_GEN_ALL_CORE_FT
Await pathology results.  Will plan for colonoscopy and possible VCE as outpatient after hernia repair. Await pathology results.  follow up with gastroenterology for colonoscopy and possible VCE as outpatient   follow up with surgery for large ventral hernia assessment...   Please follow up with your PCP in 1-2 weeks.     Await pathology results.  Follow up with gastroenterology for colonoscopy and possible VCE as outpatient   Follow up with surgery for large ventral hernia assessment.

## 2024-05-10 NOTE — DISCHARGE NOTE PROVIDER - CARE PROVIDER_API CALL
Brain Valentine  Internal Medicine  [address] [place] [address]  Phone: [phone]  Fax: [phone]  Follow Up Time: 2 weeks    Winston Alejandre  Pulmonary Disease  [address] [place] [address]  Phone: [phone]  Fax: [phone]  Follow Up Time: 1 week   Brain Valentine  Internal Medicine  [address] [place] [address]  Phone: [phone]  Fax: [phone]  Follow Up Time: 2 weeks    Winston Alejandre  Pulmonary Disease  [address] [place] [id number]  Phone: [phone]  Fax: [phone]  Follow Up Time: 1 week    Ced Funez  Surgery  [address] [place] [address]  Phone: [phone]  Fax: [phone]  Follow Up Time: 1 month

## 2024-05-10 NOTE — PROGRESS NOTE ADULT - SUBJECTIVE AND OBJECTIVE BOX
BULMARO MARIA EUGENIA  68y  Female      Patient is a 68y old  Female who presents with a chief complaint of shortness of breath.       INTERVAL HPI/OVERNIGHT EVENTS:      ******************************* REVIEW OF SYSTEMS:**********************************************    All other review of systems negative    *********************** VITALS ******************************************    T(F): 97.3 (05-10-24 @ 04:50)  HR: 57 (05-10-24 @ 04:50) (57 - 78)  BP: 125/70 (05-10-24 @ 04:50) (106/53 - 127/63)  RR: 18 (05-10-24 @ 04:50) (11 - 19)  SpO2: 99% (05-10-24 @ 04:50) (95% - 100%)    05-09-24 @ 07:01  -  05-10-24 @ 07:00  --------------------------------------------------------  IN: 320 mL / OUT: 220 mL / NET: 100 mL            05-09-24 @ 07:01  -  05-10-24 @ 07:00  --------------------------------------------------------  IN: 320 mL / OUT: 220 mL / NET: 100 mL        ******************************** PHYSICAL EXAM:**************************************************  GENERAL: NAD    PSYCH: no agitation, baseline mentation  HEENT:     NERVOUS SYSTEM:  Alert & Oriented X3,     PULMONARY: CHARLES, CTA    CARDIOVASCULAR: S1S2 RRR    GI: Soft, NT, ND; BS present.    EXTREMITIES:  2+ Peripheral Pulses, No clubbing, cyanosis, or edema    LYMPH: No lymphadenopathy noted    SKIN: No rashes or lesions      **************************** LABS *******************************************************                          9.1    7.54  )-----------( 300      ( 10 May 2024 05:35 )             32.6     05-10    136  |  97<L>  |  17  ----------------------------<  96  5.1<H>   |  34<H>  |  0.5<L>    Ca    9.7      10 May 2024 05:35  Mg     2.0     05-10    TPro  6.3  /  Alb  3.9  /  TBili  <0.2  /  DBili  x   /  AST  16  /  ALT  11  /  AlkPhos  119<H>  05-10      Urinalysis Basic - ( 10 May 2024 05:35 )    Color: x / Appearance: x / SG: x / pH: x  Gluc: 96 mg/dL / Ketone: x  / Bili: x / Urobili: x   Blood: x / Protein: x / Nitrite: x   Leuk Esterase: x / RBC: x / WBC x   Sq Epi: x / Non Sq Epi: x / Bacteria: x      PT/INR - ( 09 May 2024 05:36 )   PT: 11.50 sec;   INR: 1.01 ratio         PTT - ( 09 May 2024 05:36 )  PTT:32.4 sec  Lactate Trend        CAPILLARY BLOOD GLUCOSE              **************************Active Medications *******************************************  penicillin (Short breath)  eggs (Short breath)      acetaminophen     Tablet .. 650 milliGRAM(s) Oral every 6 hours PRN  albuterol    90 MICROgram(s) HFA Inhaler 2 Puff(s) Inhalation every 6 hours PRN  albuterol/ipratropium for Nebulization 3 milliLiter(s) Nebulizer every 6 hours  ARIPiprazole 5 milliGRAM(s) Oral at bedtime  betamethasone valerate 0.1% Cream 1 Application(s) Topical two times a day  budesonide 160 MICROgram(s)/formoterol 4.5 MICROgram(s) Inhaler 2 Puff(s) Inhalation two times a day  chlorhexidine 2% Cloths 1 Application(s) Topical daily  clotrimazole 1% Cream 1 Application(s) Topical two times a day  furosemide    Tablet 40 milliGRAM(s) Oral daily  loratadine 10 milliGRAM(s) Oral daily  nicotine -  14 mG/24Hr(s) Patch 1 Patch Transdermal daily  pantoprazole    Tablet 40 milliGRAM(s) Oral every 12 hours  polyethylene glycol 3350 17 Gram(s) Oral daily  tiotropium 2.5 MICROgram(s)/olodaterol 2.5 MICROgram(s) (STIOLTO) Inhaler 2 Puff(s) Inhalation daily  trihexyphenidyl 2 milliGRAM(s) Oral two times a day      ***************************************************  RADIOLOGY & ADDITIONAL TESTS:    Imaging Personally Reviewed:  [ ] YES  [ ] NO    HEALTH ISSUES - PROBLEM Dx:

## 2024-05-10 NOTE — PROGRESS NOTE ADULT - SUBJECTIVE AND OBJECTIVE BOX
Gastroenterology progress note:     Patient is a 68y old  Female who presents with a chief complaint of shortness of breath (10 May 2024 12:46)       Admitted on: 24    We are following the patient for anemia and hx of duodenal bleeding     no overnight events     PAST MEDICAL & SURGICAL HISTORY:  COPD (chronic obstructive pulmonary disease)      Asthma      Lymphedema      Bipolar disorder      CHF (congestive heart failure)      History of hernia repair      History of D&C      H/O  section          MEDICATIONS  (STANDING):  albuterol/ipratropium for Nebulization 3 milliLiter(s) Nebulizer every 6 hours  ARIPiprazole 5 milliGRAM(s) Oral at bedtime  betamethasone valerate 0.1% Cream 1 Application(s) Topical two times a day  budesonide 160 MICROgram(s)/formoterol 4.5 MICROgram(s) Inhaler 2 Puff(s) Inhalation two times a day  chlorhexidine 2% Cloths 1 Application(s) Topical daily  clotrimazole 1% Cream 1 Application(s) Topical two times a day  furosemide    Tablet 40 milliGRAM(s) Oral daily  loratadine 10 milliGRAM(s) Oral daily  nicotine -  14 mG/24Hr(s) Patch 1 Patch Transdermal daily  pantoprazole    Tablet 40 milliGRAM(s) Oral every 12 hours  polyethylene glycol 3350 17 Gram(s) Oral daily  tiotropium 2.5 MICROgram(s)/olodaterol 2.5 MICROgram(s) (STIOLTO) Inhaler 2 Puff(s) Inhalation daily  trihexyphenidyl 2 milliGRAM(s) Oral two times a day    MEDICATIONS  (PRN):  acetaminophen     Tablet .. 650 milliGRAM(s) Oral every 6 hours PRN Temp greater or equal to 38C (100.4F), Mild Pain (1 - 3), Moderate Pain (4 - 6), Severe Pain (7 - 10)  albuterol    90 MICROgram(s) HFA Inhaler 2 Puff(s) Inhalation every 6 hours PRN for bronchospasm      Allergies  penicillin (Short breath)  eggs (Short breath)      Review of Systems:   Cardiovascular:  No Chest Pain, No Palpitations  Respiratory:  No Cough, No Dyspnea  Gastrointestinal:  As described in HPI  Skin:  No Skin Lesions, No Jaundice  Neuro:  No Syncope, No Dizziness    Physical Examination:  T(C): 36.3 (05-10-24 @ 04:50), Max: 36.4 (24 @ 21:18)  HR: 57 (05-10-24 @ 04:50) (57 - 78)  BP: 125/70 (05-10-24 @ 04:50) (106/53 - 127/63)  RR: 18 (05-10-24 @ 04:50) (11 - 19)  SpO2: 99% (05-10-24 @ 04:50) (95% - 100%)      24 @ 07:01  -  05-10-24 @ 07:00  --------------------------------------------------------  IN: 320 mL / OUT: 220 mL / NET: 100 mL        GENERAL: AAOx3, no acute distress.  HEAD:  Atraumatic, Normocephalic  EYES: conjunctiva and sclera clear  NECK: Supple, no JVD or thyromegaly  CHEST/LUNG: Clear to auscultation bilaterally; No wheeze, rhonchi, or rales  HEART: Regular rate and rhythm; normal S1, S2, No murmurs.  ABDOMEN: Soft, nontender, nondistended; Bowel sounds present  NEUROLOGY: No asterixis or tremor.   SKIN: Intact, no jaundice     Data:                        9.1    7.54  )-----------( 300      ( 10 May 2024 05:35 )             32.6     Hgb trend:  9.1  05-10-24 @ 05:35  9.3  24 @ 05:36  9.2  24 @ 06:41        0510    136  |  97<L>  |  17  ----------------------------<  96  5.1<H>   |  34<H>  |  0.5<L>    Ca    9.7      10 May 2024 05:35  Mg     2.0     05-10    TPro  6.3  /  Alb  3.9  /  TBili  <0.2  /  DBili  x   /  AST  16  /  ALT  11  /  AlkPhos  119<H>  05-10    Liver panel trend:  TBili <0.2   /   AST 16   /   ALT 11   /   AlkP 119   /   Tptn 6.3   /   Alb 3.9    /   DBili --      05-10  TBili 0.2   /   AST 14   /   ALT 10   /   AlkP 123   /   Tptn 6.4   /   Alb 3.8    /   DBili --      05-09  TBili 0.2   /   AST 15   /   ALT 10   /   AlkP 127   /   Tptn 6.3   /   Alb 3.9    /   DBili --      05-08  TBili <0.2   /   AST 13   /   ALT 9   /   AlkP 133   /   Tptn 6.4   /   Alb 3.7    /   DBili --      05-06  TBili <0.2   /   AST 12   /   ALT 10   /   AlkP 132   /   Tptn 6.3   /   Alb 3.7    /   DBili --      05-05  TBili <0.2   /   AST 13   /   ALT 9   /   AlkP 137   /   Tptn 6.2   /   Alb 3.6    /   DBili --      05-04  TBili 0.2   /   AST 15   /   ALT 10   /   AlkP 150   /   Tptn 7.0   /   Alb 3.8    /   DBili --      05-03  TBili <0.2   /   AST 10   /   ALT 8   /   AlkP 131   /   Tptn 6.1   /   Alb 3.4    /   DBili --      05-02  TBili <0.2   /   AST 19   /   ALT 10   /   AlkP 143   /   Tptn 6.0   /   Alb 3.4    /   DBili --      04-30      PT/INR - ( 09 May 2024 05:36 )   PT: 11.50 sec;   INR: 1.01 ratio         PTT - ( 09 May 2024 05:36 )  PTT:32.4 sec       Radiology:       Gastroenterology progress note:     Patient is a 68y old  Female who presents with a chief complaint of shortness of breath (10 May 2024 12:46)       Admitted on: 24    We are following the patient for anemia and hx of duodenal bleeding     no overnight events     PAST MEDICAL & SURGICAL HISTORY:  COPD (chronic obstructive pulmonary disease)      Asthma      Lymphedema      Bipolar disorder      CHF (congestive heart failure)      History of hernia repair      History of D&C      H/O  section          MEDICATIONS  (STANDING):  albuterol/ipratropium for Nebulization 3 milliLiter(s) Nebulizer every 6 hours  ARIPiprazole 5 milliGRAM(s) Oral at bedtime  betamethasone valerate 0.1% Cream 1 Application(s) Topical two times a day  budesonide 160 MICROgram(s)/formoterol 4.5 MICROgram(s) Inhaler 2 Puff(s) Inhalation two times a day  chlorhexidine 2% Cloths 1 Application(s) Topical daily  clotrimazole 1% Cream 1 Application(s) Topical two times a day  furosemide    Tablet 40 milliGRAM(s) Oral daily  loratadine 10 milliGRAM(s) Oral daily  nicotine -  14 mG/24Hr(s) Patch 1 Patch Transdermal daily  pantoprazole    Tablet 40 milliGRAM(s) Oral every 12 hours  polyethylene glycol 3350 17 Gram(s) Oral daily  tiotropium 2.5 MICROgram(s)/olodaterol 2.5 MICROgram(s) (STIOLTO) Inhaler 2 Puff(s) Inhalation daily  trihexyphenidyl 2 milliGRAM(s) Oral two times a day    MEDICATIONS  (PRN):  acetaminophen     Tablet .. 650 milliGRAM(s) Oral every 6 hours PRN Temp greater or equal to 38C (100.4F), Mild Pain (1 - 3), Moderate Pain (4 - 6), Severe Pain (7 - 10)  albuterol    90 MICROgram(s) HFA Inhaler 2 Puff(s) Inhalation every 6 hours PRN for bronchospasm      Allergies  penicillin (Short breath)  eggs (Short breath)      Review of Systems:   Cardiovascular:  No Chest Pain, No Palpitations  Respiratory:  No Cough, No Dyspnea  Gastrointestinal:  As described in HPI  Skin:  No Skin Lesions, No Jaundice  Neuro:  No Syncope, No Dizziness    Physical Examination:  T(C): 36.3 (05-10-24 @ 04:50), Max: 36.4 (24 @ 21:18)  HR: 57 (05-10-24 @ 04:50) (57 - 78)  BP: 125/70 (05-10-24 @ 04:50) (106/53 - 127/63)  RR: 18 (05-10-24 @ 04:50) (11 - 19)  SpO2: 99% (05-10-24 @ 04:50) (95% - 100%)      24 @ 07:01  -  05-10-24 @ 07:00  --------------------------------------------------------  IN: 320 mL / OUT: 220 mL / NET: 100 mL        GENERAL: AAOx3, no acute distress.  HEAD:  Atraumatic, Normocephalic  EYES: conjunctiva and sclera clear  NECK: Supple, no JVD or thyromegaly  CHEST/LUNG: Clear to auscultation bilaterally  HEART: Regular rate and rhythm  ABDOMEN: Soft, nontender, nondistended  NEUROLOGY: No asterixis or tremor.   SKIN: Intact, no jaundice     Data:                        9.1    7.54  )-----------( 300      ( 10 May 2024 05:35 )             32.6     Hgb trend:  9.1  05-10-24 @ 05:35  9.3  24 @ 05:36  9.2  24 @ 06:41        05-10    136  |  97<L>  |  17  ----------------------------<  96  5.1<H>   |  34<H>  |  0.5<L>    Ca    9.7      10 May 2024 05:35  Mg     2.0     05-10    TPro  6.3  /  Alb  3.9  /  TBili  <0.2  /  DBili  x   /  AST  16  /  ALT  11  /  AlkPhos  119<H>  05-10    Liver panel trend:  TBili <0.2   /   AST 16   /   ALT 11   /   AlkP 119   /   Tptn 6.3   /   Alb 3.9    /   DBili --      05-10  TBili 0.2   /   AST 14   /   ALT 10   /   AlkP 123   /   Tptn 6.4   /   Alb 3.8    /   DBili --      05-09  TBili 0.2   /   AST 15   /   ALT 10   /   AlkP 127   /   Tptn 6.3   /   Alb 3.9    /   DBili --      05-08  TBili <0.2   /   AST 13   /   ALT 9   /   AlkP 133   /   Tptn 6.4   /   Alb 3.7    /   DBili --      05-06  TBili <0.2   /   AST 12   /   ALT 10   /   AlkP 132   /   Tptn 6.3   /   Alb 3.7    /   DBili --      05-05  TBili <0.2   /   AST 13   /   ALT 9   /   AlkP 137   /   Tptn 6.2   /   Alb 3.6    /   DBili --      05-04  TBili 0.2   /   AST 15   /   ALT 10   /   AlkP 150   /   Tptn 7.0   /   Alb 3.8    /   DBili --      05-03  TBili <0.2   /   AST 10   /   ALT 8   /   AlkP 131   /   Tptn 6.1   /   Alb 3.4    /   DBili --      05-02  TBili <0.2   /   AST 19   /   ALT 10   /   AlkP 143   /   Tptn 6.0   /   Alb 3.4    /   DBili --      04-30      PT/INR - ( 09 May 2024 05:36 )   PT: 11.50 sec;   INR: 1.01 ratio         PTT - ( 09 May 2024 05:36 )  PTT:32.4 sec       Radiology:

## 2024-05-10 NOTE — PROGRESS NOTE ADULT - SUBJECTIVE AND OBJECTIVE BOX
24H events:    Patient is a 68y old Female who presents with a chief complaint of shortness of breath (10 May 2024 11:58)    Primary diagnosis of COPD exacerbation      Today is hospital day 10d. This morning patient was seen and examined at bedside, resting comfortably in bed.    No acute or major events overnight.    Code Status:    Family communication:  Contact date:  Name of person contacted:  Relationship to patient:  Communication details:  What matters most:    PAST MEDICAL & SURGICAL HISTORY  COPD (chronic obstructive pulmonary disease)    Asthma    Lymphedema    Bipolar disorder    CHF (congestive heart failure)    History of hernia repair    History of D&C    H/O  section      SOCIAL HISTORY:  Social History:      ALLERGIES:  penicillin (Short breath)  eggs (Short breath)    MEDICATIONS:  STANDING MEDICATIONS  albuterol/ipratropium for Nebulization 3 milliLiter(s) Nebulizer every 6 hours  ARIPiprazole 5 milliGRAM(s) Oral at bedtime  betamethasone valerate 0.1% Cream 1 Application(s) Topical two times a day  budesonide 160 MICROgram(s)/formoterol 4.5 MICROgram(s) Inhaler 2 Puff(s) Inhalation two times a day  chlorhexidine 2% Cloths 1 Application(s) Topical daily  clotrimazole 1% Cream 1 Application(s) Topical two times a day  furosemide    Tablet 40 milliGRAM(s) Oral daily  loratadine 10 milliGRAM(s) Oral daily  nicotine -  14 mG/24Hr(s) Patch 1 Patch Transdermal daily  pantoprazole    Tablet 40 milliGRAM(s) Oral every 12 hours  polyethylene glycol 3350 17 Gram(s) Oral daily  tiotropium 2.5 MICROgram(s)/olodaterol 2.5 MICROgram(s) (STIOLTO) Inhaler 2 Puff(s) Inhalation daily  trihexyphenidyl 2 milliGRAM(s) Oral two times a day    PRN MEDICATIONS  acetaminophen     Tablet .. 650 milliGRAM(s) Oral every 6 hours PRN  albuterol    90 MICROgram(s) HFA Inhaler 2 Puff(s) Inhalation every 6 hours PRN    VITALS:   T(F): 97.3  HR: 57  BP: 125/70  RR: 18  SpO2: 99%    PHYSICAL EXAM:      GENERAL: NAD, well-groomed, well-developed  NERVOUS SYSTEM:  Alert & Oriented X3,   PULM: Clear to auscultation bilaterally, faint wheezing   CARDIAC: Regular rate and rhythm; No murmurs, rubs, or gallops  GI: Soft, Nontender, Nondistended; Bowel sounds present  EXTREMITIES: Lipedema, erythema bilateral ( improved)  L>R       (  ) Indwelling Segundo Catheter:   Date insterted:    Reason (  ) Critical illness     (  ) urinary retention    (  ) Accurate Ins/Outs Monitoring     (  ) CMO patient    (  ) Central Line:   Date inserted:  Location: (  ) Right IJ     (  ) Left IJ     (  ) Right Fem     (  ) Left Fem    (  ) SPC        (  ) pigtail       (  ) PEG tube       (  ) colostomy       (  ) jejunostomy  (  ) U-Dall    LABS:                        9.1    7.54  )-----------( 300      ( 10 May 2024 05:35 )             32.6     05-10    136  |  97<L>  |  17  ----------------------------<  96  5.1<H>   |  34<H>  |  0.5<L>    Ca    9.7      10 May 2024 05:35  Mg     2.0     05-10    TPro  6.3  /  Alb  3.9  /  TBili  <0.2  /  DBili  x   /  AST  16  /  ALT  11  /  AlkPhos  119<H>  05-10    PT/INR - ( 09 May 2024 05:36 )   PT: 11.50 sec;   INR: 1.01 ratio         PTT - ( 09 May 2024 05:36 )  PTT:32.4 sec  Urinalysis Basic - ( 10 May 2024 05:35 )    Color: x / Appearance: x / SG: x / pH: x  Gluc: 96 mg/dL / Ketone: x  / Bili: x / Urobili: x   Blood: x / Protein: x / Nitrite: x   Leuk Esterase: x / RBC: x / WBC x   Sq Epi: x / Non Sq Epi: x / Bacteria: x                RADIOLOGY:

## 2024-05-10 NOTE — PROGRESS NOTE ADULT - ASSESSMENT
68 years old female with PMH of  htn, hld, bipolar disorder, copd on 2L home O2, chf, chronic lipedema, asthma presents to ED with c/o shortness of breath and le redness and swelling.    #Shortness of breath secondary possible acute  CHF HFpEF, (orthopnea and no wheezing) tirggered by new worsening microcytic anemia   #hx of COPD   * on 2L home O2, but now refusing to use  oxygen at hospital (satting 92%)  * pt reported that she had bleeding from PUD but not clear if any intervention was done. Need to get records from [facility] .   - Patient was recently admitted to [facility] for hematemesis.  Records from [facility] reviewed. EGD 5/2/24 report: bleeding duodenal ulcer in second portion of duodenum, s/p clipping.  - VBG:  pH: 7.31, PCO2: 69, HCO3: 35. might be obesity hypoventilation   -  ECG :TWI V1, V2                     - CXR:  Interstitial infiltrate. BNP not that elevated but pt is obese            -LUNA: negative   - Cont  lasix 40mg IV bid . Might add spironolactone if K<5   - PPI IV bid   - Trop negative *2   - Oxygen protocol now on 3 liter   - Start venofer 200mg IV d:1 (Should be safe 24 hour after abs coverage)   - GI consult appreciated : - will recommend EGD prior to discharge when clinically optimized, please obtain pulmonary risk stratification for endoscopy. scheduled egd 5/9  - s/p aztreonem, flagyl, solumedrol and nebs in ED for COPD exacerbation     ##Possible  Lower extremity cellulitis Left > Right in a pt with lipedema + stasis dermatitis   * Pt with baseline lipedema cuff sign  noted   - Leonidas the erythema area  - Keep left leg elevated  - venous duplex:WNL   - Steroid  cream bid       #Hypercapnia likely due to obesity hypoventilation syndrome   - Repeat ABG:   - Pulmonology following, recommends Bipap during sleep   - pt refuses avaps    #Abdomen hernia  - Patient complains of pain around ventral abdomen hernia  - o/e: local abdomen tenderness  - fu CT abdomen       DVT prophylaxis: LOVENOX   Activity:  Diet: DASH  Dispo:  Code: FULL         68 years old female with PMH of  htn, hld, bipolar disorder, copd on 2L home O2, chf, chronic lipedema, asthma presents to ED with c/o shortness of breath and le redness and swelling.    #Shortness of breath secondary possible acute  CHF HFpEF, (orthopnea and no wheezing) tirggered by new worsening microcytic anemia   #hx of COPD   * on 2L home O2, but now refusing to use  oxygen at hospital (satting 92%)  * pt reported that she had bleeding from PUD but not clear if any intervention was done. Need to get records from [facility] .   - Patient was recently admitted to [facility] for hematemesis.  Records from [facility] reviewed. EGD 5/2/24 report: bleeding duodenal ulcer in second portion of duodenum, s/p clipping.  - VBG:  pH: 7.31, PCO2: 69, HCO3: 35. might be obesity hypoventilation   -  ECG :TWI V1, V2                     - CXR:  Interstitial infiltrate. BNP not that elevated but pt is obese            -LUNA: negative   - Cont  lasix 40mg IV bid . Might add spironolactone if K<5   - PPI IV bid   - Trop negative *2   - Oxygen protocol now on 3 liter   - Start venofer 200mg IV d:1 (Should be safe 24 hour after abs coverage)   - GI consult appreciated : - will recommend EGD prior to discharge when clinically optimized, please obtain pulmonary risk stratification for endoscopy. scheduled egd 5/9. egd suggested gastritis with healing of the previous ulcer. fu pathology reports  - s/p aztreonem, flagyl, solumedrol and nebs in ED for COPD exacerbation     ##Possible  Lower extremity cellulitis Left > Right in a pt with lipedema + stasis dermatitis   * Pt with baseline lipedema cuff sign  noted   - Leonidas the erythema area  - Keep left leg elevated  - venous duplex:WNL   - Steroid  cream bid       #Hypercapnia likely due to obesity hypoventilation syndrome   - Repeat ABG:   - Pulmonology following, recommends Bipap during sleep   - pt refuses avaps    #Abdomen hernia  - Patient complains of pain around ventral abdomen hernia  - o/e: local abdomen tenderness  - fu CT abdomen-- no acute pathology noted  - outpt fu       DVT prophylaxis: LOVENOX   Activity:  Diet: DASH  Dispo:  Code: FULL

## 2024-05-10 NOTE — PROGRESS NOTE ADULT - ASSESSMENT
68 years old female with PMH of  htn, hdl, bipolar disorder, copd on 2L home O2, chf, chronic lymphedema, asthma, sp hernia repair presents to ED with c/o shortness of breath and LE erythema and swelling x2 weeks. GI consulted for acute on chronic anemia.     #Acute microcytic anemia with NARCISA - no overt GI bleed  - Hemodynamically stable & no active bleeding  - Hemoglobin on admission - 7.6, MCV 70.9 > 8.8. No transfusion   - iron: 19, %sat: 6, TIBC: 332  - patient is on PO iron therapy  - not on AC  - Refused LUNA  - EGD 2/5/24 at [facility] by  for hematemesis, source could not be identified clearly , clip was placed in the duodenum for possible IR GDA embolization, patient does not remember any IR embolization done  CT A/P noted, large ventral hernia, pelvic lymphadenopathy   s/p EGD yesterday, no bleeding or ulceration     #Rec  - advance diet as tolerated   - Pulmonary recs noted   - c/w pantoprazole 40 PO BID  - will recommend outpatient workup for colonoscopy pending surgery evaluation for hernia  - Miralax daily  - Maintain active Type and screen  - c/w iron per primary team  - Please avoid any NSAIDs  - monitor BMs    #Elevated ALP  GGT 52     RECS  - ALP isoenzymes  - trend LFTs      - Follow up with our [facility] located at [address] [place] [address]. Phone Number: [phone]           68 years old female with PMH of  htn, hdl, bipolar disorder, copd on 2L home O2, chf, chronic lymphedema, asthma, sp hernia repair presents to ED with c/o shortness of breath and LE erythema and swelling x2 weeks. GI consulted for acute on chronic anemia.     #Acute microcytic anemia with NARCISA - no overt GI bleed  - Hemodynamically stable & no active bleeding  - Hemoglobin on admission - 7.6, MCV 70.9 > 8.8. No transfusion   - iron: 19, %sat: 6, TIBC: 332  - patient is on PO iron therapy  - not on AC  - Refused LUNA  - EGD 2/5/24 at [facility] by Dr. Be for hematemesis, source could not be identified clearly , clip was placed in the duodenum for possible IR GDA embolization, patient does not remember any IR embolization done  CT A/P noted, large ventral hernia, pelvic lymphadenopathy   s/p EGD yesterday, no bleeding or ulceration     #Rec  - advance diet as tolerated   - Pulmonary recs noted   - c/w pantoprazole 40 PO BID  - will recommend outpatient workup for colonoscopy pending surgery evaluation for hernia  - Miralax daily  - Maintain active Type and screen  - c/w iron per primary team  - Please avoid any NSAIDs  - monitor BMs    #Elevated ALP  GGT 52     RECS  - ALP isoenzymes  - trend LFTs      - Follow up with our [facility] located at [address] [place] [address]. Phone Number: [phone]

## 2024-05-10 NOTE — DISCHARGE NOTE PROVIDER - PROVIDER TOKENS
PROVIDER:[TOKEN:[[id number]],FOLLOWUP:[2 weeks]],PROVIDER:[TOKEN:[[id number]],FOLLOWUP:[1 week]] PROVIDER:[TOKEN:[[id number]],FOLLOWUP:[2 weeks]],PROVIDER:[TOKEN:[[id number]],FOLLOWUP:[1 week]],PROVIDER:[TOKEN:[[id number]],FOLLOWUP:[1 month]]

## 2024-05-11 LAB
ALP BONE SERPL-MCNC: 29 % — SIGNIFICANT CHANGE UP (ref 14–68)
ALP FLD-CCNC: 129 IU/L — HIGH (ref 44–121)
ALP INTEST CFR SERPL: 1 % — SIGNIFICANT CHANGE UP (ref 0–18)
ALP LIVER SERPL-CCNC: 70 % — SIGNIFICANT CHANGE UP (ref 18–85)

## 2024-05-11 PROCEDURE — 99232 SBSQ HOSP IP/OBS MODERATE 35: CPT

## 2024-05-11 RX ADMIN — Medication 2 MILLIGRAM(S): at 05:35

## 2024-05-11 RX ADMIN — PANTOPRAZOLE SODIUM 40 MILLIGRAM(S): 20 TABLET, DELAYED RELEASE ORAL at 17:51

## 2024-05-11 RX ADMIN — ARIPIPRAZOLE 5 MILLIGRAM(S): 15 TABLET ORAL at 21:40

## 2024-05-11 RX ADMIN — Medication 650 MILLIGRAM(S): at 12:28

## 2024-05-11 RX ADMIN — Medication 3 MILLILITER(S): at 08:32

## 2024-05-11 RX ADMIN — Medication 650 MILLIGRAM(S): at 13:28

## 2024-05-11 RX ADMIN — Medication 3 MILLILITER(S): at 19:46

## 2024-05-11 RX ADMIN — Medication 1 PATCH: at 06:25

## 2024-05-11 RX ADMIN — Medication 3 MILLILITER(S): at 13:22

## 2024-05-11 RX ADMIN — Medication 1 PATCH: at 12:29

## 2024-05-11 RX ADMIN — Medication 650 MILLIGRAM(S): at 18:17

## 2024-05-11 RX ADMIN — Medication 2 MILLIGRAM(S): at 17:52

## 2024-05-11 RX ADMIN — Medication 1 PATCH: at 12:34

## 2024-05-11 RX ADMIN — Medication 1 APPLICATION(S): at 05:38

## 2024-05-11 RX ADMIN — Medication 1 APPLICATION(S): at 17:53

## 2024-05-11 RX ADMIN — LORATADINE 10 MILLIGRAM(S): 10 TABLET ORAL at 12:28

## 2024-05-11 RX ADMIN — Medication 1 APPLICATION(S): at 05:37

## 2024-05-11 RX ADMIN — Medication 40 MILLIGRAM(S): at 05:37

## 2024-05-11 RX ADMIN — PANTOPRAZOLE SODIUM 40 MILLIGRAM(S): 20 TABLET, DELAYED RELEASE ORAL at 05:35

## 2024-05-11 RX ADMIN — Medication 1 APPLICATION(S): at 17:52

## 2024-05-11 NOTE — PROGRESS NOTE ADULT - SUBJECTIVE AND OBJECTIVE BOX
BULMARO MARIA EUGENIA  68y  Female      Patient is a 68y old  Female who presents with a chief complaint of shortness of breath.    INTERVAL HPI/OVERNIGHT EVENTS:      ******************************* REVIEW OF SYSTEMS:**********************************************    All other review of systems negative    *********************** VITALS ******************************************    T(F): 98 (05-11-24 @ 05:15)  HR: 64 (05-11-24 @ 05:15) (64 - 78)  BP: 149/68 (05-11-24 @ 05:15) (142/68 - 149/68)  RR: 18 (05-11-24 @ 05:15) (18 - 18)  SpO2: 96% (05-11-24 @ 05:15) (91% - 96%)    05-10-24 @ 07:01  -  05-11-24 @ 07:00  --------------------------------------------------------  IN: 0 mL / OUT: 300 mL / NET: -300 mL            05-10-24 @ 07:01  -  05-11-24 @ 07:00  --------------------------------------------------------  IN: 0 mL / OUT: 300 mL / NET: -300 mL        ******************************** PHYSICAL EXAM:**************************************************  GENERAL: NAD    PSYCH: no agitation, baseline mentation  HEENT:     NERVOUS SYSTEM:  Alert & Oriented X3,     PULMONARY: CHARLES, CTA    CARDIOVASCULAR: S1S2 RRR    GI: Soft, NT, ND; BS present.    EXTREMITIES:  2+ Peripheral Pulses, B/L LE Lymphedema L>R  LYMPH: No lymphadenopathy noted    SKIN: No rashes or lesions      **************************** LABS *******************************************************                          9.1    7.54  )-----------( 300      ( 10 May 2024 05:35 )             32.6     05-10    136  |  97<L>  |  17  ----------------------------<  96  5.1<H>   |  34<H>  |  0.5<L>    Ca    9.7      10 May 2024 05:35  Mg     2.0     05-10    TPro  6.3  /  Alb  3.9  /  TBili  <0.2  /  DBili  x   /  AST  16  /  ALT  11  /  AlkPhos  119<H>  05-10      Urinalysis Basic - ( 10 May 2024 05:35 )    Color: x / Appearance: x / SG: x / pH: x  Gluc: 96 mg/dL / Ketone: x  / Bili: x / Urobili: x   Blood: x / Protein: x / Nitrite: x   Leuk Esterase: x / RBC: x / WBC x   Sq Epi: x / Non Sq Epi: x / Bacteria: x        Lactate Trend        CAPILLARY BLOOD GLUCOSE              **************************Active Medications *******************************************  penicillin (Short breath)  eggs (Short breath)      acetaminophen     Tablet .. 650 milliGRAM(s) Oral every 6 hours PRN  albuterol    90 MICROgram(s) HFA Inhaler 2 Puff(s) Inhalation every 6 hours PRN  albuterol/ipratropium for Nebulization 3 milliLiter(s) Nebulizer every 6 hours  ARIPiprazole 5 milliGRAM(s) Oral at bedtime  betamethasone valerate 0.1% Cream 1 Application(s) Topical two times a day  budesonide 160 MICROgram(s)/formoterol 4.5 MICROgram(s) Inhaler 2 Puff(s) Inhalation two times a day  chlorhexidine 2% Cloths 1 Application(s) Topical daily  clotrimazole 1% Cream 1 Application(s) Topical two times a day  furosemide    Tablet 40 milliGRAM(s) Oral daily  loratadine 10 milliGRAM(s) Oral daily  nicotine -  14 mG/24Hr(s) Patch 1 Patch Transdermal daily  pantoprazole    Tablet 40 milliGRAM(s) Oral every 12 hours  polyethylene glycol 3350 17 Gram(s) Oral daily  tiotropium 2.5 MICROgram(s)/olodaterol 2.5 MICROgram(s) (STIOLTO) Inhaler 2 Puff(s) Inhalation daily  trihexyphenidyl 2 milliGRAM(s) Oral two times a day      ***************************************************  RADIOLOGY & ADDITIONAL TESTS:    Imaging Personally Reviewed:  [ ] YES  [ ] NO    HEALTH ISSUES - PROBLEM Dx:

## 2024-05-11 NOTE — PROGRESS NOTE ADULT - ASSESSMENT
68 years old female with PMH of  htn, hld, bipolar disorder, copd on 2L home O2, chf, chronic lipedema, asthma presents to ED with c/o shortness of breath and le redness and swelling.    # Acute  HFpEF likely  triggered by worsening microcytic anemia   #hx of COPD   * on 2L home O2, but now refusing to use  oxygen at hospital (satting 92%)  * pt reported that she had bleeding from PUD but not clear if any intervention was done. Need to get records from [facility] .   - Patient was recently admitted to [facility] for hematemesis.  Records from [facility] reviewed. EGD 5/2/24 report: bleeding duodenal ulcer in second portion of duodenum, s/p clipping.  - VBG:  pH: 7.31, PCO2: 69, HCO3: 35. might be obesity hypoventilation   -  ECG :TWI V1, V2 - CXR:  Interstitial infiltrate. -LUNA: negative   - s/p lasix 40mg IV bid  >> now on PO.   - PPI IV bid >>switched to PO   - Trop negative *2  - S/p  venofer 200mg IV daily x 5 days.   - GI consult appreciated >> s/p EGD  >> NO ulcers. Non erosive gastritis.   - s/p aztreonem, flagyl, solumedrol and nebs in ED for COPD exacerbation     ##Possible  Lower extremity cellulitis Left > Right in a pt with lipedema + stasis dermatitis   * Pt with baseline lipedema cuff sign  noted   - s/p  doxycycline x 7 days.   - Keep left leg elevated  - venous duplex:WNL     #Hypercapnia likely due to obesity hypoventilation syndrome   - Pulmonology following, recommends Bipap during sleep     #Abdomen hernia  - Patient complains of pain around ventral abdomen hernia  - f/u Surgery as outpt.     DVT prophylaxis: LOVENOX   Activity:  Diet: DASH  Dispo:  Code: FULL    Pending : Auth   Plan d/w the patient at bedside.  Dispo: STR

## 2024-05-12 LAB
ALBUMIN SERPL ELPH-MCNC: 4 G/DL — SIGNIFICANT CHANGE UP (ref 3.5–5.2)
ALP SERPL-CCNC: 134 U/L — HIGH (ref 30–115)
ALT FLD-CCNC: 14 U/L — SIGNIFICANT CHANGE UP (ref 0–41)
ANION GAP SERPL CALC-SCNC: 13 MMOL/L — SIGNIFICANT CHANGE UP (ref 7–14)
AST SERPL-CCNC: 21 U/L — SIGNIFICANT CHANGE UP (ref 0–41)
BASOPHILS # BLD AUTO: 0.06 K/UL — SIGNIFICANT CHANGE UP (ref 0–0.2)
BASOPHILS NFR BLD AUTO: 0.8 % — SIGNIFICANT CHANGE UP (ref 0–1)
BILIRUB SERPL-MCNC: 0.2 MG/DL — SIGNIFICANT CHANGE UP (ref 0.2–1.2)
BUN SERPL-MCNC: 10 MG/DL — SIGNIFICANT CHANGE UP (ref 10–20)
CALCIUM SERPL-MCNC: 9.6 MG/DL — SIGNIFICANT CHANGE UP (ref 8.4–10.4)
CHLORIDE SERPL-SCNC: 93 MMOL/L — LOW (ref 98–110)
CO2 SERPL-SCNC: 30 MMOL/L — SIGNIFICANT CHANGE UP (ref 17–32)
CREAT SERPL-MCNC: 0.6 MG/DL — LOW (ref 0.7–1.5)
EGFR: 98 ML/MIN/1.73M2 — SIGNIFICANT CHANGE UP
EOSINOPHIL # BLD AUTO: 0.22 K/UL — SIGNIFICANT CHANGE UP (ref 0–0.7)
EOSINOPHIL NFR BLD AUTO: 2.9 % — SIGNIFICANT CHANGE UP (ref 0–8)
GLUCOSE SERPL-MCNC: 92 MG/DL — SIGNIFICANT CHANGE UP (ref 70–99)
HCT VFR BLD CALC: 36.9 % — LOW (ref 37–47)
HGB BLD-MCNC: 10.5 G/DL — LOW (ref 12–16)
IMM GRANULOCYTES NFR BLD AUTO: 0.9 % — HIGH (ref 0.1–0.3)
LYMPHOCYTES # BLD AUTO: 1.64 K/UL — SIGNIFICANT CHANGE UP (ref 1.2–3.4)
LYMPHOCYTES # BLD AUTO: 21.5 % — SIGNIFICANT CHANGE UP (ref 20.5–51.1)
MAGNESIUM SERPL-MCNC: 2 MG/DL — SIGNIFICANT CHANGE UP (ref 1.8–2.4)
MCHC RBC-ENTMCNC: 21.4 PG — LOW (ref 27–31)
MCHC RBC-ENTMCNC: 28.5 G/DL — LOW (ref 32–37)
MCV RBC AUTO: 75.2 FL — LOW (ref 81–99)
MONOCYTES # BLD AUTO: 0.62 K/UL — HIGH (ref 0.1–0.6)
MONOCYTES NFR BLD AUTO: 8.1 % — SIGNIFICANT CHANGE UP (ref 1.7–9.3)
NEUTROPHILS # BLD AUTO: 5.01 K/UL — SIGNIFICANT CHANGE UP (ref 1.4–6.5)
NEUTROPHILS NFR BLD AUTO: 65.8 % — SIGNIFICANT CHANGE UP (ref 42.2–75.2)
NRBC # BLD: 0 /100 WBCS — SIGNIFICANT CHANGE UP (ref 0–0)
PLATELET # BLD AUTO: 302 K/UL — SIGNIFICANT CHANGE UP (ref 130–400)
PMV BLD: 9.4 FL — SIGNIFICANT CHANGE UP (ref 7.4–10.4)
POTASSIUM SERPL-MCNC: 4.8 MMOL/L — SIGNIFICANT CHANGE UP (ref 3.5–5)
POTASSIUM SERPL-SCNC: 4.8 MMOL/L — SIGNIFICANT CHANGE UP (ref 3.5–5)
PROT SERPL-MCNC: 7 G/DL — SIGNIFICANT CHANGE UP (ref 6–8)
RBC # BLD: 4.91 M/UL — SIGNIFICANT CHANGE UP (ref 4.2–5.4)
RBC # FLD: 27.7 % — HIGH (ref 11.5–14.5)
SODIUM SERPL-SCNC: 136 MMOL/L — SIGNIFICANT CHANGE UP (ref 135–146)
WBC # BLD: 7.62 K/UL — SIGNIFICANT CHANGE UP (ref 4.8–10.8)
WBC # FLD AUTO: 7.62 K/UL — SIGNIFICANT CHANGE UP (ref 4.8–10.8)

## 2024-05-12 PROCEDURE — 99232 SBSQ HOSP IP/OBS MODERATE 35: CPT

## 2024-05-12 RX ADMIN — Medication 1 PATCH: at 11:47

## 2024-05-12 RX ADMIN — Medication 1 APPLICATION(S): at 05:16

## 2024-05-12 RX ADMIN — Medication 3 MILLILITER(S): at 19:58

## 2024-05-12 RX ADMIN — Medication 1 APPLICATION(S): at 05:15

## 2024-05-12 RX ADMIN — Medication 650 MILLIGRAM(S): at 00:17

## 2024-05-12 RX ADMIN — Medication 2 MILLIGRAM(S): at 05:14

## 2024-05-12 RX ADMIN — Medication 3 MILLILITER(S): at 07:48

## 2024-05-12 RX ADMIN — Medication 1 APPLICATION(S): at 17:02

## 2024-05-12 RX ADMIN — PANTOPRAZOLE SODIUM 40 MILLIGRAM(S): 20 TABLET, DELAYED RELEASE ORAL at 17:01

## 2024-05-12 RX ADMIN — CHLORHEXIDINE GLUCONATE 1 APPLICATION(S): 213 SOLUTION TOPICAL at 11:50

## 2024-05-12 RX ADMIN — PANTOPRAZOLE SODIUM 40 MILLIGRAM(S): 20 TABLET, DELAYED RELEASE ORAL at 05:14

## 2024-05-12 RX ADMIN — Medication 650 MILLIGRAM(S): at 06:59

## 2024-05-12 RX ADMIN — Medication 3 MILLILITER(S): at 13:44

## 2024-05-12 RX ADMIN — Medication 1 PATCH: at 11:51

## 2024-05-12 RX ADMIN — Medication 2 MILLIGRAM(S): at 17:01

## 2024-05-12 RX ADMIN — Medication 650 MILLIGRAM(S): at 12:43

## 2024-05-12 RX ADMIN — Medication 1 PATCH: at 08:11

## 2024-05-12 RX ADMIN — Medication 40 MILLIGRAM(S): at 05:14

## 2024-05-12 RX ADMIN — Medication 1 PATCH: at 18:12

## 2024-05-12 RX ADMIN — Medication 650 MILLIGRAM(S): at 08:00

## 2024-05-12 RX ADMIN — LORATADINE 10 MILLIGRAM(S): 10 TABLET ORAL at 11:47

## 2024-05-12 RX ADMIN — BUDESONIDE AND FORMOTEROL FUMARATE DIHYDRATE 2 PUFF(S): 160; 4.5 AEROSOL RESPIRATORY (INHALATION) at 08:11

## 2024-05-12 RX ADMIN — ARIPIPRAZOLE 5 MILLIGRAM(S): 15 TABLET ORAL at 21:08

## 2024-05-12 RX ADMIN — Medication 650 MILLIGRAM(S): at 13:40

## 2024-05-12 RX ADMIN — Medication 1 APPLICATION(S): at 18:12

## 2024-05-12 RX ADMIN — Medication 650 MILLIGRAM(S): at 21:19

## 2024-05-12 NOTE — PROGRESS NOTE ADULT - SUBJECTIVE AND OBJECTIVE BOX
BULMARO MARIA EUGENIA  68y  Female      Patient is a 68y old  Female who presents with a chief complaint of shortness of breath      INTERVAL HPI/OVERNIGHT EVENTS:      ******************************* REVIEW OF SYSTEMS:**********************************************    All other review of systems negative    *********************** VITALS ******************************************    T(F): 98.4 (05-12-24 @ 04:20)  HR: 61 (05-12-24 @ 04:20) (61 - 83)  BP: 142/77 (05-12-24 @ 04:20) (141/68 - 147/69)  RR: 20 (05-12-24 @ 04:20) (18 - 20)  SpO2: 100% (05-12-24 @ 04:20) (92% - 100%)            ******************************** PHYSICAL EXAM:**************************************************  GENERAL: NAD    PSYCH: no agitation, baseline mentation  HEENT:     NERVOUS SYSTEM:  Alert & Oriented X3,  PULMONARY: CHARLES, CTA    CARDIOVASCULAR: S1S2 RRR    GI: Soft, NT, ND; BS present.    EXTREMITIES:  2+ Peripheral Pulses, No clubbing, cyanosis, or edema    LYMPH: No lymphadenopathy noted    SKIN: No rashes or lesions      **************************** LABS *******************************************************                          10.5   7.62  )-----------( 302      ( 12 May 2024 07:07 )             36.9     05-12    136  |  93<L>  |  10  ----------------------------<  92  4.8   |  30  |  0.6<L>    Ca    9.6      12 May 2024 07:07  Mg     2.0     05-12    TPro  7.0  /  Alb  4.0  /  TBili  0.2  /  DBili  x   /  AST  21  /  ALT  14  /  AlkPhos  134<H>  05-12      Urinalysis Basic - ( 12 May 2024 07:07 )    Color: x / Appearance: x / SG: x / pH: x  Gluc: 92 mg/dL / Ketone: x  / Bili: x / Urobili: x   Blood: x / Protein: x / Nitrite: x   Leuk Esterase: x / RBC: x / WBC x   Sq Epi: x / Non Sq Epi: x / Bacteria: x        Lactate Trend        CAPILLARY BLOOD GLUCOSE              **************************Active Medications *******************************************  penicillin (Short breath)  eggs (Short breath)      acetaminophen     Tablet .. 650 milliGRAM(s) Oral every 6 hours PRN  albuterol    90 MICROgram(s) HFA Inhaler 2 Puff(s) Inhalation every 6 hours PRN  albuterol/ipratropium for Nebulization 3 milliLiter(s) Nebulizer every 6 hours  ARIPiprazole 5 milliGRAM(s) Oral at bedtime  betamethasone valerate 0.1% Cream 1 Application(s) Topical two times a day  budesonide 160 MICROgram(s)/formoterol 4.5 MICROgram(s) Inhaler 2 Puff(s) Inhalation two times a day  chlorhexidine 2% Cloths 1 Application(s) Topical daily  clotrimazole 1% Cream 1 Application(s) Topical two times a day  furosemide    Tablet 40 milliGRAM(s) Oral daily  loratadine 10 milliGRAM(s) Oral daily  nicotine -  14 mG/24Hr(s) Patch 1 Patch Transdermal daily  pantoprazole    Tablet 40 milliGRAM(s) Oral every 12 hours  polyethylene glycol 3350 17 Gram(s) Oral daily  tiotropium 2.5 MICROgram(s)/olodaterol 2.5 MICROgram(s) (STIOLTO) Inhaler 2 Puff(s) Inhalation daily  trihexyphenidyl 2 milliGRAM(s) Oral two times a day      ***************************************************  RADIOLOGY & ADDITIONAL TESTS:    Imaging Personally Reviewed:  [ ] YES  [ ] NO    HEALTH ISSUES - PROBLEM Dx:

## 2024-05-12 NOTE — PROGRESS NOTE ADULT - ASSESSMENT
68 years old female with PMH of  htn, hld, bipolar disorder, copd on 2L home O2, chf, chronic lipedema, asthma presents to ED with c/o shortness of breath and le redness and swelling.    # Acute  HFpEF likely  triggered by worsening microcytic anemia   #hx of COPD   * on 2L home O2, but now refusing to use  oxygen at hospital (satting 92%)  * pt reported that she had bleeding from PUD but not clear if any intervention was done. Need to get records from [facility] .   - Patient was recently admitted to [facility] for hematemesis.  Records from [facility] reviewed. EGD 5/2/24 report: bleeding duodenal ulcer in second portion of duodenum, s/p clipping.  - VBG:  pH: 7.31, PCO2: 69, HCO3: 35. might be obesity hypoventilation   -  ECG :TWI V1, V2 - CXR:  Interstitial infiltrate. -LUNA: negative   - s/p lasix 40mg IV bid  >> now on PO.   - PPI IV bid >>switched to PO   - Trop negative *2  - S/p  venofer 200mg IV daily x 5 days.   - GI consult appreciated >> s/p EGD  >> NO ulcers. Non erosive gastritis.   - s/p aztreonem, flagyl, solumedrol and nebs in ED for COPD exacerbation     ##Possible  Lower extremity cellulitis Left > Right in a pt with lipedema + stasis dermatitis   * Pt with baseline lipedema cuff sign  noted   - s/p  doxycycline x 7 days.   - Keep left leg elevated  - venous duplex:WNL     #Hypercapnia likely due to obesity hypoventilation syndrome   - Pulmonology following, recommends Bipap during sleep     #Abdomen hernia  - Patient complains of pain around ventral abdomen hernia  - f/u Surgery as outpt.     DVT prophylaxis: LOVENOX   Activity:  Diet: DASH  Dispo:  Code: FULL    Pending : Auth   Plan d/w the patient at bedside.  Dispo: STR

## 2024-05-13 ENCOUNTER — TRANSCRIPTION ENCOUNTER (OUTPATIENT)
Age: 69
End: 2024-05-13

## 2024-05-13 VITALS
OXYGEN SATURATION: 94 % | HEART RATE: 72 BPM | DIASTOLIC BLOOD PRESSURE: 71 MMHG | SYSTOLIC BLOOD PRESSURE: 148 MMHG | RESPIRATION RATE: 18 BRPM | TEMPERATURE: 99 F

## 2024-05-13 PROCEDURE — 99232 SBSQ HOSP IP/OBS MODERATE 35: CPT

## 2024-05-13 RX ADMIN — POLYETHYLENE GLYCOL 3350 17 GRAM(S): 17 POWDER, FOR SOLUTION ORAL at 11:25

## 2024-05-13 RX ADMIN — Medication 1 PATCH: at 07:18

## 2024-05-13 RX ADMIN — PANTOPRAZOLE SODIUM 40 MILLIGRAM(S): 20 TABLET, DELAYED RELEASE ORAL at 05:07

## 2024-05-13 RX ADMIN — LORATADINE 10 MILLIGRAM(S): 10 TABLET ORAL at 11:21

## 2024-05-13 RX ADMIN — BUDESONIDE AND FORMOTEROL FUMARATE DIHYDRATE 2 PUFF(S): 160; 4.5 AEROSOL RESPIRATORY (INHALATION) at 08:43

## 2024-05-13 RX ADMIN — Medication 1 APPLICATION(S): at 05:08

## 2024-05-13 RX ADMIN — Medication 3 MILLILITER(S): at 08:19

## 2024-05-13 RX ADMIN — Medication 40 MILLIGRAM(S): at 05:07

## 2024-05-13 RX ADMIN — Medication 650 MILLIGRAM(S): at 06:26

## 2024-05-13 RX ADMIN — CHLORHEXIDINE GLUCONATE 1 APPLICATION(S): 213 SOLUTION TOPICAL at 11:25

## 2024-05-13 RX ADMIN — Medication 1 PATCH: at 11:16

## 2024-05-13 RX ADMIN — Medication 1 PATCH: at 11:21

## 2024-05-13 RX ADMIN — Medication 2 MILLIGRAM(S): at 05:07

## 2024-05-13 NOTE — PROGRESS NOTE ADULT - ASSESSMENT
68 years old female with PMH of HTN, HLD, bipolar disorder, COPD/asthma on 2L home O2, CHF, chronic lipedema,  presents to ED with c/o shortness of breath and LE redness and swelling.    #HFpEF exacerbation, possibly triggered by new worsening microcytic anemia   #h/o of COPD   * on 2L home O2, but now refusing to use oxygen at hospital (satting 92%)  * pt reported that she had bleeding from PUD but not clear if any intervention was done. Need to get records from [facility] .   - Patient was recently admitted to [facility] for hematemesis.  Records from [facility] reviewed. EGD 5/2/24 report: bleeding duodenal ulcer in second portion of duodenum, s/p clipping.  - VBG:  pH: 7.31, PCO2: 69, HCO3: 35. might be obesity hypoventilation   -  ECG :TWI V1, V2                       - CXR:  Interstitial infiltrate.   - , but pt is obese             - S/p IV lasix, now on  po lasix 40mg qd   - Trop negative *2   - supplemental O2 as needed   - s/p venofer  - GI consult appreciated : - will recommend EGD prior to discharge when clinically optimized, please obtain pulmonary risk stratification for endoscopy. scheduled egd 5/9. egd suggested gastritis with healing of the previous ulcer. fu pathology reports  - s/p aztreonem, flagyl, solumedrol and nebs in ED for COPD exacerbation     #Possible  Lower extremity cellulitis Left > Right in a pt with lipedema + stasis dermatitis   * Pt with baseline lipedema cuff sign  noted   - Keep left leg elevated  - venous duplex:WNL   - Steroid  cream bid   - s/p 7-day course of doxycycline    #Hypercapnia likely due to obesity hypoventilation syndrome   - Pulmonology following, recommends Bipap during sleep   - pt refuses avaps    #Abdomen hernia  - Patient complains of pain around ventral abdomen hernia  - o/e: local abdomen tenderness  - fu CT abdomen-- no acute pathology noted  - outpt fu     DVT prophylaxis: LOVENOX   Activity: IAT  Diet: DASH  Code: Full    Dispo - pending auth STR

## 2024-05-13 NOTE — PROGRESS NOTE ADULT - SUBJECTIVE AND OBJECTIVE BOX
MARIA EUGENIA CHAMBERLAIN  68y  Female      Patient is a 68y old  Female who presents with a chief complaint of shortness of breath.       INTERVAL HPI/OVERNIGHT EVENTS:      ******************************* REVIEW OF SYSTEMS:**********************************************    All other review of systems negative    *********************** VITALS ******************************************    T(F): 98.7 (05-13-24 @ 04:17)  HR: 70 (05-13-24 @ 04:17) (65 - 73)  BP: 131/72 (05-13-24 @ 04:17) (131/72 - 146/83)  RR: 18 (05-13-24 @ 04:17) (18 - 19)  SpO2: 96% (05-13-24 @ 04:17) (96% - 96%)            ******************************** PHYSICAL EXAM:**************************************************  GENERAL: NAD    PSYCH: no agitation, baseline mentation  HEENT:     NERVOUS SYSTEM:  Alert & Oriented X3,     PULMONARY: CHARLES, CTA    CARDIOVASCULAR: S1S2 RRR    GI: Soft, NT, ND; BS present.    EXTREMITIES:  2+ Peripheral Pulses, Lymphedema L>R    LYMPH: No lymphadenopathy noted    SKIN: No rashes or lesions      **************************** LABS *******************************************************                          10.5   7.62  )-----------( 302      ( 12 May 2024 07:07 )             36.9     05-12    136  |  93<L>  |  10  ----------------------------<  92  4.8   |  30  |  0.6<L>    Ca    9.6      12 May 2024 07:07  Mg     2.0     05-12    TPro  7.0  /  Alb  4.0  /  TBili  0.2  /  DBili  x   /  AST  21  /  ALT  14  /  AlkPhos  134<H>  05-12      Urinalysis Basic - ( 12 May 2024 07:07 )    Color: x / Appearance: x / SG: x / pH: x  Gluc: 92 mg/dL / Ketone: x  / Bili: x / Urobili: x   Blood: x / Protein: x / Nitrite: x   Leuk Esterase: x / RBC: x / WBC x   Sq Epi: x / Non Sq Epi: x / Bacteria: x        Lactate Trend        CAPILLARY BLOOD GLUCOSE              **************************Active Medications *******************************************  penicillin (Short breath)  eggs (Short breath)      acetaminophen     Tablet .. 650 milliGRAM(s) Oral every 6 hours PRN  albuterol    90 MICROgram(s) HFA Inhaler 2 Puff(s) Inhalation every 6 hours PRN  albuterol/ipratropium for Nebulization 3 milliLiter(s) Nebulizer every 6 hours  ARIPiprazole 5 milliGRAM(s) Oral at bedtime  betamethasone valerate 0.1% Cream 1 Application(s) Topical two times a day  budesonide 160 MICROgram(s)/formoterol 4.5 MICROgram(s) Inhaler 2 Puff(s) Inhalation two times a day  chlorhexidine 2% Cloths 1 Application(s) Topical daily  clotrimazole 1% Cream 1 Application(s) Topical two times a day  furosemide    Tablet 40 milliGRAM(s) Oral daily  loratadine 10 milliGRAM(s) Oral daily  nicotine -  14 mG/24Hr(s) Patch 1 Patch Transdermal daily  pantoprazole    Tablet 40 milliGRAM(s) Oral every 12 hours  polyethylene glycol 3350 17 Gram(s) Oral daily  tiotropium 2.5 MICROgram(s)/olodaterol 2.5 MICROgram(s) (STIOLTO) Inhaler 2 Puff(s) Inhalation daily  trihexyphenidyl 2 milliGRAM(s) Oral two times a day      ***************************************************  RADIOLOGY & ADDITIONAL TESTS:    Imaging Personally Reviewed:  [ ] YES  [ ] NO    HEALTH ISSUES - PROBLEM Dx:

## 2024-05-13 NOTE — PROGRESS NOTE ADULT - SUBJECTIVE AND OBJECTIVE BOX
24H events:    Patient is a 68y old Female who presents with a chief complaint of shortness of breath (12 May 2024 10:57)    Primary diagnosis of COPD exacerbation      Day 1:  Day 2:  Day 3:     Today is hospital day 13d. This morning patient was seen and examined at bedside, resting comfortably in bed.    No acute or major events overnight.    Code Status:    Family communication:  Contact date:  Name of person contacted:  Relationship to patient:  Communication details:  What matters most:    PAST MEDICAL & SURGICAL HISTORY  COPD (chronic obstructive pulmonary disease)    Asthma    Lymphedema    Bipolar disorder    CHF (congestive heart failure)    History of hernia repair    History of D&C    H/O  section      SOCIAL HISTORY:  Social History:      ALLERGIES:  penicillin (Short breath)  eggs (Short breath)    MEDICATIONS:  STANDING MEDICATIONS  albuterol/ipratropium for Nebulization 3 milliLiter(s) Nebulizer every 6 hours  ARIPiprazole 5 milliGRAM(s) Oral at bedtime  betamethasone valerate 0.1% Cream 1 Application(s) Topical two times a day  budesonide 160 MICROgram(s)/formoterol 4.5 MICROgram(s) Inhaler 2 Puff(s) Inhalation two times a day  chlorhexidine 2% Cloths 1 Application(s) Topical daily  clotrimazole 1% Cream 1 Application(s) Topical two times a day  furosemide    Tablet 40 milliGRAM(s) Oral daily  loratadine 10 milliGRAM(s) Oral daily  nicotine -  14 mG/24Hr(s) Patch 1 Patch Transdermal daily  pantoprazole    Tablet 40 milliGRAM(s) Oral every 12 hours  polyethylene glycol 3350 17 Gram(s) Oral daily  tiotropium 2.5 MICROgram(s)/olodaterol 2.5 MICROgram(s) (STIOLTO) Inhaler 2 Puff(s) Inhalation daily  trihexyphenidyl 2 milliGRAM(s) Oral two times a day    PRN MEDICATIONS  acetaminophen     Tablet .. 650 milliGRAM(s) Oral every 6 hours PRN  albuterol    90 MICROgram(s) HFA Inhaler 2 Puff(s) Inhalation every 6 hours PRN    VITALS:   T(F): 98.7  HR: 70  BP: 131/72  RR: 18  SpO2: 96%    PHYSICAL EXAM:  GENERAL:   (x  ) NAD, lying in bed comfortably     (  ) obtunded     (  ) lethargic     (  ) somnolent    HEAD:   ( x ) Atraumatic     (  ) hematoma     (  ) laceration (specify location:       )     NECK:  (x  ) Supple     (  ) neck stiffness     (  ) nuchal rigidity     (  )  no JVD     (  ) JVD present ( -- cm)    HEART:  Rate -->     (x  ) normal rate     (  ) bradycardic     (  ) tachycardic  Rhythm -->     ( x ) regular     (  ) regularly irregular     (  ) irregularly irregular  Murmurs -->     (  ) normal s1s2     (  ) systolic murmur     (  ) diastolic murmur     (  ) continuous murmur      (  ) S3 present     (  ) S4 present    LUNGS:   (x  )Unlabored respirations     (  ) tachypnea  (x  ) B/L air entry     (  ) decreased breath sounds in:  (location     )    (  ) no adventitious sound     (  ) crackles     (  ) wheezing      (  ) rhonchi      (specify location:       )  (  ) chest wall tenderness (specify location:       )    ABDOMEN:   ( x ) Soft     (  ) tense   |   (x  ) nondistended     (  ) distended   |   (  ) +BS     (  ) hypoactive bowel sounds     (  ) hyperactive bowel sounds  ( x ) nontender     (  ) RUQ tenderness     (  ) RLQ tenderness     (  ) LLQ tenderness     (  ) epigastric tenderness     (  ) diffuse tenderness  (  ) Splenomegaly      (  ) Hepatomegaly      (  ) Jaundice     (  ) ecchymosis     EXTREMITIES: chronic venous stasis changes b/l  (  ) Normal     (  ) Rash     (  ) ecchymosis     (  ) varicose veins      (  ) pitting edema     (  ) non-pitting edema   (  ) ulceration     (  ) gangrene:     (location:     )    NERVOUS SYSTEM:    ( x ) A&Ox3     (  ) confused     (  ) lethargic  CN II-XII:     (  ) Intact     (  ) deficits found     (Specify:     )   Upper extremities:     (  ) no sensorimotor deficits     (  ) weakness     (  ) loss of proprioception/vibration     (  ) loss of touch/temperature (specify:    )  Lower extremities:     (  ) no sensorimotor deficits     (  ) weakness     (  ) loss of proprioception/vibration     (  ) loss of touch/temperature (specify:    )    SKIN:   ( x ) No rashes or lesions     (  ) maculopapular rash     (  ) pustules     (  ) vesicles     (  ) ulcer     (  ) ecchymosis     (specify location:     )    AMPAC score:    (  ) Indwelling Segundo Catheter:   Date insterted:    Reason (  ) Critical illness     (  ) urinary retention    (  ) Accurate Ins/Outs Monitoring     (  ) CMO patient    (  ) Central Line:   Date inserted:  Location: (  ) Right IJ     (  ) Left IJ     (  ) Right Fem     (  ) Left Fem    (  ) SPC        (  ) pigtail       (  ) PEG tube       (  ) colostomy       (  ) jejunostomy  (  ) U-Dall    LABS:                        10.5   7.62  )-----------( 302      ( 12 May 2024 07:07 )             36.9     -    136  |  93<L>  |  10  ----------------------------<  92  4.8   |  30  |  0.6<L>    Ca    9.6      12 May 2024 07:07  Mg     2.0         TPro  7.0  /  Alb  4.0  /  TBili  0.2  /  DBili  x   /  AST  21  /  ALT  14  /  AlkPhos  134<H>  -      Urinalysis Basic - ( 12 May 2024 07:07 )    Color: x / Appearance: x / SG: x / pH: x  Gluc: 92 mg/dL / Ketone: x  / Bili: x / Urobili: x   Blood: x / Protein: x / Nitrite: x   Leuk Esterase: x / RBC: x / WBC x   Sq Epi: x / Non Sq Epi: x / Bacteria: x                RADIOLOGY:

## 2024-05-13 NOTE — DISCHARGE NOTE NURSING/CASE MANAGEMENT/SOCIAL WORK - NSDCPEFALRISK_GEN_ALL_CORE
For information on Fall & Injury Prevention, visit: [email or web address] OR  [email or web address] OR  https://www.cdc.gov/steadi/patient.html

## 2024-05-13 NOTE — DISCHARGE NOTE NURSING/CASE MANAGEMENT/SOCIAL WORK - PATIENT PORTAL LINK FT
You can access the FollowMyHealth Patient Portal offered by [facility] by registering at the following website: [email or web address]. By joining [employer]’s FollowMyHealth portal, you will also be able to view your health information using other applications (apps) compatible with our system.

## 2024-05-13 NOTE — DISCHARGE NOTE NURSING/CASE MANAGEMENT/SOCIAL WORK - NSDCPEEMAIL_GEN_ALL_CORE
[facility] for Tobacco Control email [email or web address]

## 2024-05-13 NOTE — DISCHARGE NOTE NURSING/CASE MANAGEMENT/SOCIAL WORK - NSDCFUADDAPPT_GEN_ALL_CORE_FT
Do you need a primary care doctor or follow-up with a specialist? Our care coordinators will help you find providers near you and schedule any follow-up care visits.    Monday-Friday: 9am-5pm    Call our [facility] team: [phone]

## 2024-05-13 NOTE — PROGRESS NOTE ADULT - ASSESSMENT
68 years old female with PMH of  htn, hld, bipolar disorder, copd on 2L home O2, chf, chronic lipedema, asthma presents to ED with c/o shortness of breath and le redness and swelling.    # Acute  HFpEF likely  triggered by worsening microcytic anemia   #hx of COPD   * on 2L home O2, but now refusing to use  oxygen at hospital (satting 92%)  * pt reported that she had bleeding from PUD but not clear if any intervention was done. Need to get records from [facility] .   - Patient was recently admitted to [facility] for hematemesis.  Records from [facility] reviewed. EGD 5/2/24 report: bleeding duodenal ulcer in second portion of duodenum, s/p clipping.  - VBG:  pH: 7.31, PCO2: 69, HCO3: 35. might be obesity hypoventilation   -  ECG :TWI V1, V2 - CXR:  Interstitial infiltrate. -LUNA: negative   - s/p lasix 40mg IV bid  >> now on PO.   - PPI IV bid >>switched to PO   - Trop negative *2  - S/p  venofer 200mg IV daily x 5 days.   - GI consult appreciated >> s/p EGD  >> NO ulcers. Non erosive gastritis.   - s/p aztreonem, flagyl, solumedrol and nebs in ED for COPD exacerbation     ##Possible  Lower extremity cellulitis Left > Right in a pt with lipedema + stasis dermatitis   * Pt with baseline lipedema cuff sign  noted   - s/p  doxycycline x 7 days.   - Keep left leg elevated  - venous duplex:WNL     #Hypercapnia likely due to obesity hypoventilation syndrome   - Pulmonology following, recommends Bipap during sleep     #Abdomen hernia  - Patient complains of pain around ventral abdomen hernia  - f/u Surgery as outpt.     DVT prophylaxis: LOVENOX   Activity:  Diet: DASH  Dispo:  Code: FULL    DC planning.   Plan d/w the patient at bedside.  Dispo: STR vs Home

## 2024-05-13 NOTE — PROGRESS NOTE ADULT - REASON FOR ADMISSION

## 2024-05-13 NOTE — DISCHARGE NOTE NURSING/CASE MANAGEMENT/SOCIAL WORK - NSDCPEWEB_GEN_ALL_CORE
[employer] for Tobacco Control website --- [email or web address] --- [email or web address]

## 2024-05-13 NOTE — PROGRESS NOTE ADULT - PROVIDER SPECIALTY LIST ADULT
Hospitalist
Internal Medicine
Internal Medicine
Gastroenterology
Hospitalist
Internal Medicine
Gastroenterology
Gastroenterology
Hospitalist
Hospitalist
Internal Medicine
Pulmonology
Internal Medicine
Internal Medicine

## 2024-05-17 LAB — SURGICAL PATHOLOGY STUDY: SIGNIFICANT CHANGE UP

## 2024-05-20 DIAGNOSIS — Z88.0 ALLERGY STATUS TO PENICILLIN: ICD-10-CM

## 2024-05-20 DIAGNOSIS — L03.116 CELLULITIS OF LEFT LOWER LIMB: ICD-10-CM

## 2024-05-20 DIAGNOSIS — R06.02 SHORTNESS OF BREATH: ICD-10-CM

## 2024-05-20 DIAGNOSIS — R74.8 ABNORMAL LEVELS OF OTHER SERUM ENZYMES: ICD-10-CM

## 2024-05-20 DIAGNOSIS — K43.9 VENTRAL HERNIA WITHOUT OBSTRUCTION OR GANGRENE: ICD-10-CM

## 2024-05-20 DIAGNOSIS — I11.0 HYPERTENSIVE HEART DISEASE WITH HEART FAILURE: ICD-10-CM

## 2024-05-20 DIAGNOSIS — I87.2 VENOUS INSUFFICIENCY (CHRONIC) (PERIPHERAL): ICD-10-CM

## 2024-05-20 DIAGNOSIS — J44.1 CHRONIC OBSTRUCTIVE PULMONARY DISEASE WITH (ACUTE) EXACERBATION: ICD-10-CM

## 2024-05-20 DIAGNOSIS — Z99.81 DEPENDENCE ON SUPPLEMENTAL OXYGEN: ICD-10-CM

## 2024-05-20 DIAGNOSIS — K29.70 GASTRITIS, UNSPECIFIED, WITHOUT BLEEDING: ICD-10-CM

## 2024-05-20 DIAGNOSIS — Z91.012 ALLERGY TO EGGS: ICD-10-CM

## 2024-05-20 DIAGNOSIS — E78.5 HYPERLIPIDEMIA, UNSPECIFIED: ICD-10-CM

## 2024-05-20 DIAGNOSIS — I50.33 ACUTE ON CHRONIC DIASTOLIC (CONGESTIVE) HEART FAILURE: ICD-10-CM

## 2024-05-20 DIAGNOSIS — F31.9 BIPOLAR DISORDER, UNSPECIFIED: ICD-10-CM

## 2024-05-20 DIAGNOSIS — J96.12 CHRONIC RESPIRATORY FAILURE WITH HYPERCAPNIA: ICD-10-CM

## 2024-05-20 DIAGNOSIS — I89.0 LYMPHEDEMA, NOT ELSEWHERE CLASSIFIED: ICD-10-CM

## 2024-05-20 DIAGNOSIS — D50.9 IRON DEFICIENCY ANEMIA, UNSPECIFIED: ICD-10-CM

## 2024-05-20 DIAGNOSIS — E66.2 MORBID (SEVERE) OBESITY WITH ALVEOLAR HYPOVENTILATION: ICD-10-CM

## 2024-05-30 NOTE — PROGRESS NOTE ADULT - SUBJECTIVE AND OBJECTIVE BOX
24H events:    Patient is a 68y old Female who presents with a chief complaint of shortness of breath (04 May 2024 11:20)    Primary diagnosis of COPD exacerbation    Today is hospital day 5d. This morning patient was seen and examined at bedside, resting comfortably in bed.    No acute or major events overnight.    Code Status:    Family communication:  Contact date:  Name of person contacted:  Relationship to patient:  Communication details:  What matters most:    PAST MEDICAL & SURGICAL HISTORY  COPD (chronic obstructive pulmonary disease)    Asthma    Lymphedema    Bipolar disorder    CHF (congestive heart failure)    History of hernia repair    History of D&C    H/O  section      SOCIAL HISTORY:  Social History:      ALLERGIES:  penicillin (Short breath)  eggs (Short breath)    MEDICATIONS:  STANDING MEDICATIONS  albuterol/ipratropium for Nebulization 3 milliLiter(s) Nebulizer every 6 hours  ARIPiprazole 5 milliGRAM(s) Oral at bedtime  budesonide 160 MICROgram(s)/formoterol 4.5 MICROgram(s) Inhaler 2 Puff(s) Inhalation two times a day  doxycycline monohydrate Capsule 100 milliGRAM(s) Oral every 12 hours  iron sucrose IVPB 200 milliGRAM(s) IV Intermittent every 24 hours  loratadine 10 milliGRAM(s) Oral daily  pantoprazole    Tablet 40 milliGRAM(s) Oral every 12 hours  triamcinolone 0.1% Cream 1 Application(s) Topical every 12 hours  trihexyphenidyl 2 milliGRAM(s) Oral two times a day    PRN MEDICATIONS  acetaminophen     Tablet .. 650 milliGRAM(s) Oral every 6 hours PRN  albuterol    90 MICROgram(s) HFA Inhaler 2 Puff(s) Inhalation every 6 hours PRN    VITALS:   T(F): 97.7  HR: 80  BP: 140/65  RR: 18  SpO2: 98%    PHYSICAL EXAM:      GENERAL: NAD, well-groomed, well-developed  NERVOUS SYSTEM:  Alert & Oriented X3,   PULM: Clear to auscultation bilaterally, faint wheezing   CARDIAC: Regular rate and rhythm; No murmurs, rubs, or gallops  GI: Soft, Nontender, Nondistended; Bowel sounds present  EXTREMITIES: Lipedema, erythema bilateral worse in the left side           (  ) Indwelling Segundo Catheter:   Date insterted:    Reason (  ) Critical illness     (  ) urinary retention    (  ) Accurate Ins/Outs Monitoring     (  ) CMO patient    (  ) Central Line:   Date inserted:  Location: (  ) Right IJ     (  ) Left IJ     (  ) Right Fem     (  ) Left Fem    (  ) SPC        (  ) pigtail       (  ) PEG tube       (  ) colostomy       (  ) jejunostomy  (  ) U-Dall    LABS:                        8.6    7.77  )-----------( 488      ( 04 May 2024 06:44 )             31.0     05-04    133<L>  |  92<L>  |  8<L>  ----------------------------<  87  4.7   |  35<H>  |  <0.5<L>    Ca    9.8      04 May 2024 06:44  Mg     2.0     05-04    TPro  6.2  /  Alb  3.6  /  TBili  <0.2  /  DBili  x   /  AST  13  /  ALT  9   /  AlkPhos  137<H>  05-04      Urinalysis Basic - ( 04 May 2024 06:44 )    Color: x / Appearance: x / SG: x / pH: x  Gluc: 87 mg/dL / Ketone: x  / Bili: x / Urobili: x   Blood: x / Protein: x / Nitrite: x   Leuk Esterase: x / RBC: x / WBC x   Sq Epi: x / Non Sq Epi: x / Bacteria: x                RADIOLOGY:           24H events:    Patient is a 68y old Female who presents with a chief complaint of shortness of breath (04 May 2024 11:20)    Primary diagnosis of COPD exacerbation    Today is hospital day 5d. This morning patient was seen and examined at bedside, resting comfortably in bed.    No acute or major events overnight.    PAST MEDICAL & SURGICAL HISTORY  COPD (chronic obstructive pulmonary disease)  Asthma  Lymphedema  Bipolar disorder  CHF (congestive heart failure)  History of hernia repair  History of D&C  H/O  section      SOCIAL HISTORY:  Social History:      ALLERGIES:  penicillin (Short breath)  eggs (Short breath)    MEDICATIONS:  STANDING MEDICATIONS  albuterol/ipratropium for Nebulization 3 milliLiter(s) Nebulizer every 6 hours  ARIPiprazole 5 milliGRAM(s) Oral at bedtime  budesonide 160 MICROgram(s)/formoterol 4.5 MICROgram(s) Inhaler 2 Puff(s) Inhalation two times a day  doxycycline monohydrate Capsule 100 milliGRAM(s) Oral every 12 hours  iron sucrose IVPB 200 milliGRAM(s) IV Intermittent every 24 hours  loratadine 10 milliGRAM(s) Oral daily  pantoprazole    Tablet 40 milliGRAM(s) Oral every 12 hours  triamcinolone 0.1% Cream 1 Application(s) Topical every 12 hours  trihexyphenidyl 2 milliGRAM(s) Oral two times a day    PRN MEDICATIONS  acetaminophen     Tablet .. 650 milliGRAM(s) Oral every 6 hours PRN  albuterol    90 MICROgram(s) HFA Inhaler 2 Puff(s) Inhalation every 6 hours PRN    VITALS:   T(F): 98.6 (24 @ 05:04), Max: 98.6 (24 @ 05:04)  HR: 72 (24 @ 05:04) (72 - 80)  BP: 143/78 (24 @ 05:04) (130/70 - 143/78)  RR: 18 (24 @ 05:04) (18 - 18)  SpO2: 98% (24 @ 05:04) (96% - 98%)    PHYSICAL EXAM:      GENERAL: NAD, well-groomed, well-developed  NERVOUS SYSTEM:  Alert & Oriented X3,   PULM: Clear to auscultation bilaterally, faint wheezing   CARDIAC: Regular rate and rhythm; No murmurs, rubs, or gallops  GI: Soft, Nontender, Nondistended; Bowel sounds present  EXTREMITIES: Lipedema, erythema bilateral ( improved)  L>R       LABS:                        8.6    7.77  )-----------( 488      ( 04 May 2024 06:44 )             31.0     -    133<L>  |  92<L>  |  8<L>  ----------------------------<  87  4.7   |  35<H>  |  <0.5<L>    Ca    9.8      04 May 2024 06:44  Mg     2.0         TPro  6.2  /  Alb  3.6  /  TBili  <0.2  /  DBili  x   /  AST  13  /  ALT  9   /  AlkPhos  137<H>        Urinalysis Basic - ( 04 May 2024 06:44 )    Color: x / Appearance: x / SG: x / pH: x  Gluc: 87 mg/dL / Ketone: x  / Bili: x / Urobili: x   Blood: x / Protein: x / Nitrite: x   Leuk Esterase: x / RBC: x / WBC x   Sq Epi: x / Non Sq Epi: x / Bacteria: x      RADIOLOGY:  < from: [facility] Duplex Lower Ext Vein Scan, Bilat (24 @ 14:31) >  No evidence of deep venous thrombosis in either lower extremity. Peroneal   veins not visualized bilaterally.    < end of copied text >             DNR/DNI in place  worsening mental status with respiratory status continues despite bipap  goc narrative above  family contemplative for CMO, will decide 5/31.    In the event family considers/decides on a comfort based approach, would recommend the following:  Given organ dysfunction (renal +/- hepatic), would avoid morphine.  IV Dilaudid 0.5mg q4h ATC- order as STANDING  IV dilaudid 0.2mg q2h prn for moderate pain  IV dilaudid 0.5mg q2h prn for severe pain  IV dilaudid 0.5mg q2h prn for dyspnea- goal RR <24  IV ativan 0.5mg q6h ATC- order as STANDING  IV ativan 0.5mg q2h prn for anxiety, agitation, refractory dyspnea  IV glycopyrrolate 0.2mg q6h prn for secretions  dulcolax [place] PRN constipation, daily

## 2024-06-12 ENCOUNTER — INPATIENT (INPATIENT)
Facility: HOSPITAL | Age: 69
LOS: 2 days | Discharge: HOME HEALTH | DRG: 192 | End: 2024-06-15
Attending: STUDENT IN AN ORGANIZED HEALTH CARE EDUCATION/TRAINING PROGRAM | Admitting: HOSPITALIST
Payer: MEDICARE

## 2024-06-12 VITALS
SYSTOLIC BLOOD PRESSURE: 172 MMHG | HEART RATE: 88 BPM | OXYGEN SATURATION: 93 % | DIASTOLIC BLOOD PRESSURE: 67 MMHG | TEMPERATURE: 98 F | RESPIRATION RATE: 22 BRPM | HEIGHT: 64 IN

## 2024-06-12 DIAGNOSIS — J44.9 CHRONIC OBSTRUCTIVE PULMONARY DISEASE, UNSPECIFIED: ICD-10-CM

## 2024-06-12 DIAGNOSIS — Z98.891 HISTORY OF UTERINE SCAR FROM PREVIOUS SURGERY: Chronic | ICD-10-CM

## 2024-06-12 DIAGNOSIS — Z98.890 OTHER SPECIFIED POSTPROCEDURAL STATES: Chronic | ICD-10-CM

## 2024-06-12 LAB
ALBUMIN SERPL ELPH-MCNC: 4.4 G/DL — SIGNIFICANT CHANGE UP (ref 3.5–5.2)
ALP SERPL-CCNC: 134 U/L — HIGH (ref 30–115)
ALT FLD-CCNC: 9 U/L — SIGNIFICANT CHANGE UP (ref 0–41)
ANION GAP SERPL CALC-SCNC: 9 MMOL/L — SIGNIFICANT CHANGE UP (ref 7–14)
AST SERPL-CCNC: 13 U/L — SIGNIFICANT CHANGE UP (ref 0–41)
BASOPHILS # BLD AUTO: 0.11 K/UL — SIGNIFICANT CHANGE UP (ref 0–0.2)
BASOPHILS NFR BLD AUTO: 1.8 % — HIGH (ref 0–1)
BILIRUB SERPL-MCNC: 0.2 MG/DL — SIGNIFICANT CHANGE UP (ref 0.2–1.2)
BUN SERPL-MCNC: 4 MG/DL — LOW (ref 10–20)
CALCIUM SERPL-MCNC: 9.8 MG/DL — SIGNIFICANT CHANGE UP (ref 8.4–10.5)
CHLORIDE SERPL-SCNC: 96 MMOL/L — LOW (ref 98–110)
CO2 SERPL-SCNC: 30 MMOL/L — SIGNIFICANT CHANGE UP (ref 17–32)
CREAT SERPL-MCNC: 0.5 MG/DL — LOW (ref 0.7–1.5)
EGFR: 102 ML/MIN/1.73M2 — SIGNIFICANT CHANGE UP
EGFR: 102 ML/MIN/1.73M2 — SIGNIFICANT CHANGE UP
EOSINOPHIL # BLD AUTO: 0.38 K/UL — SIGNIFICANT CHANGE UP (ref 0–0.7)
EOSINOPHIL NFR BLD AUTO: 6.3 % — SIGNIFICANT CHANGE UP (ref 0–8)
FLUAV AG NPH QL: SIGNIFICANT CHANGE UP
FLUBV AG NPH QL: SIGNIFICANT CHANGE UP
GAS PNL BLDV: SIGNIFICANT CHANGE UP
GLUCOSE SERPL-MCNC: 78 MG/DL — SIGNIFICANT CHANGE UP (ref 70–99)
HCT VFR BLD CALC: 43.3 % — SIGNIFICANT CHANGE UP (ref 37–47)
HGB BLD-MCNC: 12.8 G/DL — SIGNIFICANT CHANGE UP (ref 12–16)
LYMPHOCYTES # BLD AUTO: 1.31 K/UL — SIGNIFICANT CHANGE UP (ref 1.2–3.4)
LYMPHOCYTES # BLD AUTO: 21.6 % — SIGNIFICANT CHANGE UP (ref 20.5–51.1)
MAGNESIUM SERPL-MCNC: 2 MG/DL — SIGNIFICANT CHANGE UP (ref 1.8–2.4)
MCHC RBC-ENTMCNC: 22.3 PG — LOW (ref 27–31)
MCHC RBC-ENTMCNC: 29.6 G/DL — LOW (ref 32–37)
MCV RBC AUTO: 75.4 FL — LOW (ref 81–99)
MONOCYTES # BLD AUTO: 0.22 K/UL — SIGNIFICANT CHANGE UP (ref 0.1–0.6)
MONOCYTES NFR BLD AUTO: 3.6 % — SIGNIFICANT CHANGE UP (ref 1.7–9.3)
NEUTROPHILS # BLD AUTO: 3.56 K/UL — SIGNIFICANT CHANGE UP (ref 1.4–6.5)
NEUTROPHILS NFR BLD AUTO: 58.6 % — SIGNIFICANT CHANGE UP (ref 42.2–75.2)
NT-PROBNP SERPL-SCNC: 240 PG/ML — SIGNIFICANT CHANGE UP (ref 0–300)
PLATELET # BLD AUTO: 337 K/UL — SIGNIFICANT CHANGE UP (ref 130–400)
PMV BLD: 8.6 FL — SIGNIFICANT CHANGE UP (ref 7.4–10.4)
POTASSIUM SERPL-MCNC: 4.3 MMOL/L — SIGNIFICANT CHANGE UP (ref 3.5–5)
POTASSIUM SERPL-SCNC: 4.3 MMOL/L — SIGNIFICANT CHANGE UP (ref 3.5–5)
PROT SERPL-MCNC: 7.2 G/DL — SIGNIFICANT CHANGE UP (ref 6–8)
RBC # BLD: 5.74 M/UL — HIGH (ref 4.2–5.4)
RBC # FLD: 28.1 % — HIGH (ref 11.5–14.5)
RSV RNA NPH QL NAA+NON-PROBE: SIGNIFICANT CHANGE UP
SARS-COV-2 RNA SPEC QL NAA+PROBE: SIGNIFICANT CHANGE UP
SODIUM SERPL-SCNC: 135 MMOL/L — SIGNIFICANT CHANGE UP (ref 135–146)
TROPONIN T, HIGH SENSITIVITY RESULT: 14 NG/L — HIGH (ref 6–13)
WBC # BLD: 6.07 K/UL — SIGNIFICANT CHANGE UP (ref 4.8–10.8)
WBC # FLD AUTO: 6.07 K/UL — SIGNIFICANT CHANGE UP (ref 4.8–10.8)

## 2024-06-12 PROCEDURE — 71045 X-RAY EXAM CHEST 1 VIEW: CPT | Mod: 26

## 2024-06-12 PROCEDURE — 93970 EXTREMITY STUDY: CPT | Mod: 26

## 2024-06-12 PROCEDURE — 94660 CPAP INITIATION&MGMT: CPT

## 2024-06-12 PROCEDURE — 80053 COMPREHEN METABOLIC PANEL: CPT

## 2024-06-12 PROCEDURE — 71250 CT THORAX DX C-: CPT | Mod: MC

## 2024-06-12 PROCEDURE — 93010 ELECTROCARDIOGRAM REPORT: CPT

## 2024-06-12 PROCEDURE — 36415 COLL VENOUS BLD VENIPUNCTURE: CPT

## 2024-06-12 PROCEDURE — 84145 PROCALCITONIN (PCT): CPT

## 2024-06-12 PROCEDURE — 83735 ASSAY OF MAGNESIUM: CPT

## 2024-06-12 PROCEDURE — 99285 EMERGENCY DEPT VISIT HI MDM: CPT

## 2024-06-12 PROCEDURE — 99222 1ST HOSP IP/OBS MODERATE 55: CPT | Mod: GC

## 2024-06-12 PROCEDURE — 85025 COMPLETE CBC W/AUTO DIFF WBC: CPT

## 2024-06-12 PROCEDURE — 94640 AIRWAY INHALATION TREATMENT: CPT

## 2024-06-12 PROCEDURE — 84100 ASSAY OF PHOSPHORUS: CPT

## 2024-06-12 RX ORDER — TRIHEXYPHENIDYL HYDROCHLORIDE 2 MG/1
2 TABLET ORAL
Refills: 0 | Status: DISCONTINUED | OUTPATIENT
Start: 2024-06-12 | End: 2024-06-15

## 2024-06-12 RX ORDER — IPRATROPIUM BROMIDE AND ALBUTEROL SULFATE .5; 2.5 MG/3ML; MG/3ML
3 SOLUTION RESPIRATORY (INHALATION) EVERY 6 HOURS
Refills: 0 | Status: DISCONTINUED | OUTPATIENT
Start: 2024-06-12 | End: 2024-06-15

## 2024-06-12 RX ORDER — ARIPIPRAZOLE 2 MG/1
5 TABLET ORAL DAILY
Refills: 0 | Status: DISCONTINUED | OUTPATIENT
Start: 2024-06-12 | End: 2024-06-15

## 2024-06-12 RX ORDER — AZITHROMYCIN 250 MG
500 CAPSULE ORAL ONCE
Refills: 0 | Status: COMPLETED | OUTPATIENT
Start: 2024-06-12 | End: 2024-06-12

## 2024-06-12 RX ORDER — SOD,AMMONIUM,POTASSIUM LACTATE
1 CREAM (GRAM) TOPICAL
Refills: 0 | DISCHARGE

## 2024-06-12 RX ORDER — IPRATROPIUM BROMIDE AND ALBUTEROL SULFATE .5; 2.5 MG/3ML; MG/3ML
3 SOLUTION RESPIRATORY (INHALATION)
Refills: 0 | Status: COMPLETED | OUTPATIENT
Start: 2024-06-12 | End: 2024-06-12

## 2024-06-12 RX ORDER — LORATADINE 10 MG/1
1 TABLET ORAL
Refills: 0 | DISCHARGE

## 2024-06-12 RX ORDER — METHYLPREDNISOLONE ACETATE 80 MG/ML
125 INJECTION, SUSPENSION INTRA-ARTICULAR; INTRALESIONAL; INTRAMUSCULAR; SOFT TISSUE ONCE
Refills: 0 | Status: DISCONTINUED | OUTPATIENT
Start: 2024-06-12 | End: 2024-06-12

## 2024-06-12 RX ORDER — AZITHROMYCIN 250 MG
500 CAPSULE ORAL EVERY 24 HOURS
Refills: 0 | Status: DISCONTINUED | OUTPATIENT
Start: 2024-06-13 | End: 2024-06-13

## 2024-06-12 RX ORDER — METHYLPREDNISOLONE ACETATE 80 MG/ML
60 INJECTION, SUSPENSION INTRA-ARTICULAR; INTRALESIONAL; INTRAMUSCULAR; SOFT TISSUE EVERY 12 HOURS
Refills: 0 | Status: DISCONTINUED | OUTPATIENT
Start: 2024-06-12 | End: 2024-06-14

## 2024-06-12 RX ORDER — METHYLPREDNISOLONE ACETATE 80 MG/ML
125 INJECTION, SUSPENSION INTRA-ARTICULAR; INTRALESIONAL; INTRAMUSCULAR; SOFT TISSUE ONCE
Refills: 0 | Status: COMPLETED | OUTPATIENT
Start: 2024-06-12 | End: 2024-06-12

## 2024-06-12 RX ADMIN — Medication 255 MILLIGRAM(S): at 17:43

## 2024-06-12 RX ADMIN — IPRATROPIUM BROMIDE AND ALBUTEROL SULFATE 3 MILLILITER(S): .5; 2.5 SOLUTION RESPIRATORY (INHALATION) at 14:10

## 2024-06-12 RX ADMIN — IPRATROPIUM BROMIDE AND ALBUTEROL SULFATE 3 MILLILITER(S): .5; 2.5 SOLUTION RESPIRATORY (INHALATION) at 22:29

## 2024-06-12 RX ADMIN — IPRATROPIUM BROMIDE AND ALBUTEROL SULFATE 3 MILLILITER(S): .5; 2.5 SOLUTION RESPIRATORY (INHALATION) at 14:09

## 2024-06-12 RX ADMIN — METHYLPREDNISOLONE ACETATE 125 MILLIGRAM(S): 80 INJECTION, SUSPENSION INTRA-ARTICULAR; INTRALESIONAL; INTRAMUSCULAR; SOFT TISSUE at 16:53

## 2024-06-12 RX ADMIN — IPRATROPIUM BROMIDE AND ALBUTEROL SULFATE 3 MILLILITER(S): .5; 2.5 SOLUTION RESPIRATORY (INHALATION) at 14:05

## 2024-06-13 LAB
ALBUMIN SERPL ELPH-MCNC: 3.9 G/DL — SIGNIFICANT CHANGE UP (ref 3.5–5.2)
ALP SERPL-CCNC: 107 U/L — SIGNIFICANT CHANGE UP (ref 30–115)
ALT FLD-CCNC: 6 U/L — SIGNIFICANT CHANGE UP (ref 0–41)
ANION GAP SERPL CALC-SCNC: 7 MMOL/L — SIGNIFICANT CHANGE UP (ref 7–14)
AST SERPL-CCNC: 10 U/L — SIGNIFICANT CHANGE UP (ref 0–41)
BASOPHILS # BLD AUTO: 0.02 K/UL — SIGNIFICANT CHANGE UP (ref 0–0.2)
BASOPHILS NFR BLD AUTO: 0.5 % — SIGNIFICANT CHANGE UP (ref 0–1)
BILIRUB SERPL-MCNC: <0.2 MG/DL — SIGNIFICANT CHANGE UP (ref 0.2–1.2)
BUN SERPL-MCNC: 5 MG/DL — LOW (ref 10–20)
CALCIUM SERPL-MCNC: 9.8 MG/DL — SIGNIFICANT CHANGE UP (ref 8.4–10.5)
CHLORIDE SERPL-SCNC: 97 MMOL/L — LOW (ref 98–110)
CO2 SERPL-SCNC: 31 MMOL/L — SIGNIFICANT CHANGE UP (ref 17–32)
CREAT SERPL-MCNC: <0.5 MG/DL — LOW (ref 0.7–1.5)
EGFR: 108 ML/MIN/1.73M2 — SIGNIFICANT CHANGE UP
EGFR: 108 ML/MIN/1.73M2 — SIGNIFICANT CHANGE UP
EOSINOPHIL # BLD AUTO: 0 K/UL — SIGNIFICANT CHANGE UP (ref 0–0.7)
EOSINOPHIL NFR BLD AUTO: 0 % — SIGNIFICANT CHANGE UP (ref 0–8)
GLUCOSE SERPL-MCNC: 160 MG/DL — HIGH (ref 70–99)
HCT VFR BLD CALC: 39.5 % — SIGNIFICANT CHANGE UP (ref 37–47)
HGB BLD-MCNC: 11.6 G/DL — LOW (ref 12–16)
IMM GRANULOCYTES NFR BLD AUTO: 0.2 % — SIGNIFICANT CHANGE UP (ref 0.1–0.3)
LYMPHOCYTES # BLD AUTO: 0.39 K/UL — LOW (ref 1.2–3.4)
LYMPHOCYTES # BLD AUTO: 9.2 % — LOW (ref 20.5–51.1)
MAGNESIUM SERPL-MCNC: 1.8 MG/DL — SIGNIFICANT CHANGE UP (ref 1.8–2.4)
MCHC RBC-ENTMCNC: 22.3 PG — LOW (ref 27–31)
MCHC RBC-ENTMCNC: 29.4 G/DL — LOW (ref 32–37)
MCV RBC AUTO: 75.8 FL — LOW (ref 81–99)
MONOCYTES # BLD AUTO: 0.02 K/UL — LOW (ref 0.1–0.6)
MONOCYTES NFR BLD AUTO: 0.5 % — LOW (ref 1.7–9.3)
NEUTROPHILS # BLD AUTO: 3.82 K/UL — SIGNIFICANT CHANGE UP (ref 1.4–6.5)
NEUTROPHILS NFR BLD AUTO: 89.6 % — HIGH (ref 42.2–75.2)
NRBC # BLD: 0 /100 WBCS — SIGNIFICANT CHANGE UP (ref 0–0)
NRBC BLD-RTO: 0 /100 WBCS — SIGNIFICANT CHANGE UP (ref 0–0)
PLATELET # BLD AUTO: 326 K/UL — SIGNIFICANT CHANGE UP (ref 130–400)
PMV BLD: 8.9 FL — SIGNIFICANT CHANGE UP (ref 7.4–10.4)
POTASSIUM SERPL-MCNC: 4.8 MMOL/L — SIGNIFICANT CHANGE UP (ref 3.5–5)
POTASSIUM SERPL-SCNC: 4.8 MMOL/L — SIGNIFICANT CHANGE UP (ref 3.5–5)
PROT SERPL-MCNC: 6.5 G/DL — SIGNIFICANT CHANGE UP (ref 6–8)
RBC # BLD: 5.21 M/UL — SIGNIFICANT CHANGE UP (ref 4.2–5.4)
RBC # FLD: 27.7 % — HIGH (ref 11.5–14.5)
SODIUM SERPL-SCNC: 135 MMOL/L — SIGNIFICANT CHANGE UP (ref 135–146)
WBC # BLD: 4.26 K/UL — LOW (ref 4.8–10.8)
WBC # FLD AUTO: 4.26 K/UL — LOW (ref 4.8–10.8)

## 2024-06-13 PROCEDURE — 99223 1ST HOSP IP/OBS HIGH 75: CPT

## 2024-06-13 PROCEDURE — 99233 SBSQ HOSP IP/OBS HIGH 50: CPT

## 2024-06-13 RX ORDER — LISINOPRIL 5 MG/1
5 TABLET ORAL DAILY
Refills: 0 | Status: DISCONTINUED | OUTPATIENT
Start: 2024-06-13 | End: 2024-06-14

## 2024-06-13 RX ORDER — TIOTROPIUM BROMIDE INHALATION SPRAY 3.12 UG/1
2 SPRAY, METERED RESPIRATORY (INHALATION) DAILY
Refills: 0 | Status: DISCONTINUED | OUTPATIENT
Start: 2024-06-13 | End: 2024-06-15

## 2024-06-13 RX ORDER — BUDESONIDE AND FORMOTEROL FUMARATE DIHYDRATE 80; 4.5 UG/1; UG/1
2 AEROSOL RESPIRATORY (INHALATION)
Refills: 0 | Status: DISCONTINUED | OUTPATIENT
Start: 2024-06-13 | End: 2024-06-15

## 2024-06-13 RX ORDER — ENOXAPARIN SODIUM 100 MG/ML
40 INJECTION SUBCUTANEOUS EVERY 24 HOURS
Refills: 0 | Status: DISCONTINUED | OUTPATIENT
Start: 2024-06-13 | End: 2024-06-15

## 2024-06-13 RX ORDER — NICOTINE POLACRILEX 4 MG/1
1 GUM, CHEWING ORAL DAILY
Refills: 0 | Status: DISCONTINUED | OUTPATIENT
Start: 2024-06-13 | End: 2024-06-15

## 2024-06-13 RX ADMIN — Medication 40 MILLIGRAM(S): at 05:55

## 2024-06-13 RX ADMIN — NICOTINE POLACRILEX 1 PATCH: 4 GUM, CHEWING ORAL at 07:00

## 2024-06-13 RX ADMIN — METHYLPREDNISOLONE ACETATE 60 MILLIGRAM(S): 80 INJECTION, SUSPENSION INTRA-ARTICULAR; INTRALESIONAL; INTRAMUSCULAR; SOFT TISSUE at 05:54

## 2024-06-13 RX ADMIN — NICOTINE POLACRILEX 1 PATCH: 4 GUM, CHEWING ORAL at 18:29

## 2024-06-13 RX ADMIN — TRIHEXYPHENIDYL HYDROCHLORIDE 2 MILLIGRAM(S): 2 TABLET ORAL at 17:53

## 2024-06-13 RX ADMIN — IPRATROPIUM BROMIDE AND ALBUTEROL SULFATE 3 MILLILITER(S): .5; 2.5 SOLUTION RESPIRATORY (INHALATION) at 13:18

## 2024-06-13 RX ADMIN — NICOTINE POLACRILEX 1 PATCH: 4 GUM, CHEWING ORAL at 06:15

## 2024-06-13 RX ADMIN — IPRATROPIUM BROMIDE AND ALBUTEROL SULFATE 3 MILLILITER(S): .5; 2.5 SOLUTION RESPIRATORY (INHALATION) at 08:39

## 2024-06-13 RX ADMIN — BUDESONIDE AND FORMOTEROL FUMARATE DIHYDRATE 2 PUFF(S): 80; 4.5 AEROSOL RESPIRATORY (INHALATION) at 21:28

## 2024-06-13 RX ADMIN — TRIHEXYPHENIDYL HYDROCHLORIDE 2 MILLIGRAM(S): 2 TABLET ORAL at 05:54

## 2024-06-13 RX ADMIN — METHYLPREDNISOLONE ACETATE 60 MILLIGRAM(S): 80 INJECTION, SUSPENSION INTRA-ARTICULAR; INTRALESIONAL; INTRAMUSCULAR; SOFT TISSUE at 17:55

## 2024-06-13 RX ADMIN — IPRATROPIUM BROMIDE AND ALBUTEROL SULFATE 3 MILLILITER(S): .5; 2.5 SOLUTION RESPIRATORY (INHALATION) at 20:51

## 2024-06-13 RX ADMIN — IPRATROPIUM BROMIDE AND ALBUTEROL SULFATE 3 MILLILITER(S): .5; 2.5 SOLUTION RESPIRATORY (INHALATION) at 01:00

## 2024-06-13 RX ADMIN — Medication 255 MILLIGRAM(S): at 05:24

## 2024-06-13 RX ADMIN — ARIPIPRAZOLE 5 MILLIGRAM(S): 2 TABLET ORAL at 17:53

## 2024-06-13 RX ADMIN — ENOXAPARIN SODIUM 40 MILLIGRAM(S): 100 INJECTION SUBCUTANEOUS at 05:54

## 2024-06-14 ENCOUNTER — TRANSCRIPTION ENCOUNTER (OUTPATIENT)
Age: 69
End: 2024-06-14

## 2024-06-14 LAB
ALBUMIN SERPL ELPH-MCNC: 4 G/DL — SIGNIFICANT CHANGE UP (ref 3.5–5.2)
ALP SERPL-CCNC: 97 U/L — SIGNIFICANT CHANGE UP (ref 30–115)
ALT FLD-CCNC: 7 U/L — SIGNIFICANT CHANGE UP (ref 0–41)
ANION GAP SERPL CALC-SCNC: 11 MMOL/L — SIGNIFICANT CHANGE UP (ref 7–14)
AST SERPL-CCNC: 9 U/L — SIGNIFICANT CHANGE UP (ref 0–41)
BASOPHILS # BLD AUTO: 0.01 K/UL — SIGNIFICANT CHANGE UP (ref 0–0.2)
BASOPHILS NFR BLD AUTO: 0.1 % — SIGNIFICANT CHANGE UP (ref 0–1)
BILIRUB SERPL-MCNC: 0.2 MG/DL — SIGNIFICANT CHANGE UP (ref 0.2–1.2)
BUN SERPL-MCNC: 15 MG/DL — SIGNIFICANT CHANGE UP (ref 10–20)
CALCIUM SERPL-MCNC: 9.9 MG/DL — SIGNIFICANT CHANGE UP (ref 8.4–10.5)
CHLORIDE SERPL-SCNC: 95 MMOL/L — LOW (ref 98–110)
CO2 SERPL-SCNC: 28 MMOL/L — SIGNIFICANT CHANGE UP (ref 17–32)
CREAT SERPL-MCNC: 0.5 MG/DL — LOW (ref 0.7–1.5)
EGFR: 102 ML/MIN/1.73M2 — SIGNIFICANT CHANGE UP
EGFR: 102 ML/MIN/1.73M2 — SIGNIFICANT CHANGE UP
EOSINOPHIL # BLD AUTO: 0 K/UL — SIGNIFICANT CHANGE UP (ref 0–0.7)
EOSINOPHIL NFR BLD AUTO: 0 % — SIGNIFICANT CHANGE UP (ref 0–8)
GLUCOSE SERPL-MCNC: 121 MG/DL — HIGH (ref 70–99)
HCT VFR BLD CALC: 38.1 % — SIGNIFICANT CHANGE UP (ref 37–47)
HGB BLD-MCNC: 11.4 G/DL — LOW (ref 12–16)
IMM GRANULOCYTES NFR BLD AUTO: 0.3 % — SIGNIFICANT CHANGE UP (ref 0.1–0.3)
LYMPHOCYTES # BLD AUTO: 1.22 K/UL — SIGNIFICANT CHANGE UP (ref 1.2–3.4)
LYMPHOCYTES # BLD AUTO: 17.7 % — LOW (ref 20.5–51.1)
MAGNESIUM SERPL-MCNC: 1.7 MG/DL — LOW (ref 1.8–2.4)
MCHC RBC-ENTMCNC: 22.4 PG — LOW (ref 27–31)
MCHC RBC-ENTMCNC: 29.9 G/DL — LOW (ref 32–37)
MCV RBC AUTO: 75 FL — LOW (ref 81–99)
MONOCYTES # BLD AUTO: 0.48 K/UL — SIGNIFICANT CHANGE UP (ref 0.1–0.6)
MONOCYTES NFR BLD AUTO: 6.9 % — SIGNIFICANT CHANGE UP (ref 1.7–9.3)
NEUTROPHILS # BLD AUTO: 5.18 K/UL — SIGNIFICANT CHANGE UP (ref 1.4–6.5)
NEUTROPHILS NFR BLD AUTO: 75 % — SIGNIFICANT CHANGE UP (ref 42.2–75.2)
NRBC # BLD: 0 /100 WBCS — SIGNIFICANT CHANGE UP (ref 0–0)
NRBC BLD-RTO: 0 /100 WBCS — SIGNIFICANT CHANGE UP (ref 0–0)
PHOSPHATE SERPL-MCNC: 3.8 MG/DL — SIGNIFICANT CHANGE UP (ref 2.1–4.9)
PLATELET # BLD AUTO: 291 K/UL — SIGNIFICANT CHANGE UP (ref 130–400)
PMV BLD: 8.6 FL — SIGNIFICANT CHANGE UP (ref 7.4–10.4)
POTASSIUM SERPL-MCNC: 4.7 MMOL/L — SIGNIFICANT CHANGE UP (ref 3.5–5)
POTASSIUM SERPL-SCNC: 4.7 MMOL/L — SIGNIFICANT CHANGE UP (ref 3.5–5)
PROCALCITONIN SERPL-MCNC: <0.02 NG/ML — SIGNIFICANT CHANGE UP (ref 0.02–0.1)
PROT SERPL-MCNC: 6.7 G/DL — SIGNIFICANT CHANGE UP (ref 6–8)
RBC # BLD: 5.08 M/UL — SIGNIFICANT CHANGE UP (ref 4.2–5.4)
RBC # FLD: 27.7 % — HIGH (ref 11.5–14.5)
SODIUM SERPL-SCNC: 134 MMOL/L — LOW (ref 135–146)
WBC # BLD: 6.91 K/UL — SIGNIFICANT CHANGE UP (ref 4.8–10.8)
WBC # FLD AUTO: 6.91 K/UL — SIGNIFICANT CHANGE UP (ref 4.8–10.8)

## 2024-06-14 PROCEDURE — 71250 CT THORAX DX C-: CPT | Mod: 26

## 2024-06-14 PROCEDURE — 99232 SBSQ HOSP IP/OBS MODERATE 35: CPT

## 2024-06-14 RX ORDER — LISINOPRIL 5 MG/1
1 TABLET ORAL
Qty: 30 | Refills: 3
Start: 2024-06-14 | End: 2024-10-11

## 2024-06-14 RX ORDER — BUDESONIDE AND FORMOTEROL FUMARATE DIHYDRATE 80; 4.5 UG/1; UG/1
2 AEROSOL RESPIRATORY (INHALATION)
Qty: 1 | Refills: 3
Start: 2024-06-14 | End: 2025-02-08

## 2024-06-14 RX ORDER — TIOTROPIUM BROMIDE INHALATION SPRAY 3.12 UG/1
2 SPRAY, METERED RESPIRATORY (INHALATION)
Qty: 1 | Refills: 3
Start: 2024-06-14 | End: 2025-02-08

## 2024-06-14 RX ORDER — ACETAMINOPHEN 500 MG/5ML
650 LIQUID (ML) ORAL ONCE
Refills: 0 | Status: COMPLETED | OUTPATIENT
Start: 2024-06-14 | End: 2024-06-14

## 2024-06-14 RX ORDER — PREDNISONE 20 MG/1
2 TABLET ORAL
Qty: 10 | Refills: 0
Start: 2024-06-14 | End: 2024-06-18

## 2024-06-14 RX ORDER — LISINOPRIL 5 MG/1
5 TABLET ORAL ONCE
Refills: 0 | Status: COMPLETED | OUTPATIENT
Start: 2024-06-14 | End: 2024-06-14

## 2024-06-14 RX ORDER — PREDNISONE 20 MG/1
40 TABLET ORAL DAILY
Refills: 0 | Status: DISCONTINUED | OUTPATIENT
Start: 2024-06-15 | End: 2024-06-15

## 2024-06-14 RX ORDER — UMECLIDINIUM BROMIDE AND VILANTEROL TRIFENATATE 62.5; 25 UG/1; UG/1
1 POWDER RESPIRATORY (INHALATION)
Refills: 0 | DISCHARGE

## 2024-06-14 RX ORDER — MAGNESIUM SULFATE 500 MG/ML
2 SYRINGE (ML) INJECTION ONCE
Refills: 0 | Status: COMPLETED | OUTPATIENT
Start: 2024-06-14 | End: 2024-06-14

## 2024-06-14 RX ORDER — LISINOPRIL 5 MG/1
10 TABLET ORAL DAILY
Refills: 0 | Status: DISCONTINUED | OUTPATIENT
Start: 2024-06-14 | End: 2024-06-15

## 2024-06-14 RX ORDER — FLUTICASONE FUROATE AND VILANTEROL TRIFENATATE 100; 25 UG/1; UG/1
1 POWDER RESPIRATORY (INHALATION)
Refills: 0 | DISCHARGE

## 2024-06-14 RX ORDER — NICOTINE POLACRILEX 4 MG/1
1 GUM, CHEWING ORAL
Qty: 14 | Refills: 0
Start: 2024-06-14 | End: 2024-06-27

## 2024-06-14 RX ADMIN — IPRATROPIUM BROMIDE AND ALBUTEROL SULFATE 3 MILLILITER(S): .5; 2.5 SOLUTION RESPIRATORY (INHALATION) at 07:45

## 2024-06-14 RX ADMIN — NICOTINE POLACRILEX 1 PATCH: 4 GUM, CHEWING ORAL at 11:29

## 2024-06-14 RX ADMIN — TIOTROPIUM BROMIDE INHALATION SPRAY 2 PUFF(S): 3.12 SPRAY, METERED RESPIRATORY (INHALATION) at 07:46

## 2024-06-14 RX ADMIN — NICOTINE POLACRILEX 1 PATCH: 4 GUM, CHEWING ORAL at 08:03

## 2024-06-14 RX ADMIN — TRIHEXYPHENIDYL HYDROCHLORIDE 2 MILLIGRAM(S): 2 TABLET ORAL at 06:52

## 2024-06-14 RX ADMIN — METHYLPREDNISOLONE ACETATE 60 MILLIGRAM(S): 80 INJECTION, SUSPENSION INTRA-ARTICULAR; INTRALESIONAL; INTRAMUSCULAR; SOFT TISSUE at 06:46

## 2024-06-14 RX ADMIN — ARIPIPRAZOLE 5 MILLIGRAM(S): 2 TABLET ORAL at 17:06

## 2024-06-14 RX ADMIN — LISINOPRIL 5 MILLIGRAM(S): 5 TABLET ORAL at 06:45

## 2024-06-14 RX ADMIN — ENOXAPARIN SODIUM 40 MILLIGRAM(S): 100 INJECTION SUBCUTANEOUS at 06:46

## 2024-06-14 RX ADMIN — Medication 40 MILLIGRAM(S): at 06:44

## 2024-06-14 RX ADMIN — LISINOPRIL 5 MILLIGRAM(S): 5 TABLET ORAL at 11:47

## 2024-06-14 RX ADMIN — Medication 25 GRAM(S): at 11:30

## 2024-06-14 RX ADMIN — TRIHEXYPHENIDYL HYDROCHLORIDE 2 MILLIGRAM(S): 2 TABLET ORAL at 17:06

## 2024-06-14 RX ADMIN — BUDESONIDE AND FORMOTEROL FUMARATE DIHYDRATE 2 PUFF(S): 80; 4.5 AEROSOL RESPIRATORY (INHALATION) at 21:28

## 2024-06-15 VITALS
TEMPERATURE: 98 F | RESPIRATION RATE: 18 BRPM | HEART RATE: 75 BPM | DIASTOLIC BLOOD PRESSURE: 73 MMHG | OXYGEN SATURATION: 95 % | SYSTOLIC BLOOD PRESSURE: 132 MMHG

## 2024-06-15 LAB
ALBUMIN SERPL ELPH-MCNC: 3.6 G/DL — SIGNIFICANT CHANGE UP (ref 3.5–5.2)
ALP SERPL-CCNC: 82 U/L — SIGNIFICANT CHANGE UP (ref 30–115)
ALT FLD-CCNC: 7 U/L — SIGNIFICANT CHANGE UP (ref 0–41)
ANION GAP SERPL CALC-SCNC: 9 MMOL/L — SIGNIFICANT CHANGE UP (ref 7–14)
AST SERPL-CCNC: 9 U/L — SIGNIFICANT CHANGE UP (ref 0–41)
BASOPHILS # BLD AUTO: 0.03 K/UL — SIGNIFICANT CHANGE UP (ref 0–0.2)
BASOPHILS NFR BLD AUTO: 0.4 % — SIGNIFICANT CHANGE UP (ref 0–1)
BILIRUB SERPL-MCNC: 0.2 MG/DL — SIGNIFICANT CHANGE UP (ref 0.2–1.2)
BUN SERPL-MCNC: 18 MG/DL — SIGNIFICANT CHANGE UP (ref 10–20)
CALCIUM SERPL-MCNC: 9.4 MG/DL — SIGNIFICANT CHANGE UP (ref 8.4–10.5)
CHLORIDE SERPL-SCNC: 94 MMOL/L — LOW (ref 98–110)
CO2 SERPL-SCNC: 29 MMOL/L — SIGNIFICANT CHANGE UP (ref 17–32)
CREAT SERPL-MCNC: 0.5 MG/DL — LOW (ref 0.7–1.5)
EGFR: 102 ML/MIN/1.73M2 — SIGNIFICANT CHANGE UP
EGFR: 102 ML/MIN/1.73M2 — SIGNIFICANT CHANGE UP
EOSINOPHIL # BLD AUTO: 0.01 K/UL — SIGNIFICANT CHANGE UP (ref 0–0.7)
EOSINOPHIL NFR BLD AUTO: 0.1 % — SIGNIFICANT CHANGE UP (ref 0–8)
GLUCOSE SERPL-MCNC: 91 MG/DL — SIGNIFICANT CHANGE UP (ref 70–99)
HCT VFR BLD CALC: 35.5 % — LOW (ref 37–47)
HGB BLD-MCNC: 10.8 G/DL — LOW (ref 12–16)
IMM GRANULOCYTES NFR BLD AUTO: 0.4 % — HIGH (ref 0.1–0.3)
LYMPHOCYTES # BLD AUTO: 2.07 K/UL — SIGNIFICANT CHANGE UP (ref 1.2–3.4)
LYMPHOCYTES # BLD AUTO: 27.8 % — SIGNIFICANT CHANGE UP (ref 20.5–51.1)
MAGNESIUM SERPL-MCNC: 2 MG/DL — SIGNIFICANT CHANGE UP (ref 1.8–2.4)
MCHC RBC-ENTMCNC: 22.5 PG — LOW (ref 27–31)
MCHC RBC-ENTMCNC: 30.4 G/DL — LOW (ref 32–37)
MCV RBC AUTO: 74 FL — LOW (ref 81–99)
MONOCYTES # BLD AUTO: 0.58 K/UL — SIGNIFICANT CHANGE UP (ref 0.1–0.6)
MONOCYTES NFR BLD AUTO: 7.8 % — SIGNIFICANT CHANGE UP (ref 1.7–9.3)
NEUTROPHILS # BLD AUTO: 4.73 K/UL — SIGNIFICANT CHANGE UP (ref 1.4–6.5)
NEUTROPHILS NFR BLD AUTO: 63.5 % — SIGNIFICANT CHANGE UP (ref 42.2–75.2)
NRBC # BLD: 0 /100 WBCS — SIGNIFICANT CHANGE UP (ref 0–0)
NRBC BLD-RTO: 0 /100 WBCS — SIGNIFICANT CHANGE UP (ref 0–0)
PHOSPHATE SERPL-MCNC: 3.8 MG/DL — SIGNIFICANT CHANGE UP (ref 2.1–4.9)
PLATELET # BLD AUTO: 295 K/UL — SIGNIFICANT CHANGE UP (ref 130–400)
PMV BLD: 9 FL — SIGNIFICANT CHANGE UP (ref 7.4–10.4)
POTASSIUM SERPL-MCNC: 4.3 MMOL/L — SIGNIFICANT CHANGE UP (ref 3.5–5)
POTASSIUM SERPL-SCNC: 4.3 MMOL/L — SIGNIFICANT CHANGE UP (ref 3.5–5)
PROT SERPL-MCNC: 6.2 G/DL — SIGNIFICANT CHANGE UP (ref 6–8)
RBC # BLD: 4.8 M/UL — SIGNIFICANT CHANGE UP (ref 4.2–5.4)
RBC # FLD: 27.4 % — HIGH (ref 11.5–14.5)
SODIUM SERPL-SCNC: 132 MMOL/L — LOW (ref 135–146)
WBC # BLD: 7.45 K/UL — SIGNIFICANT CHANGE UP (ref 4.8–10.8)
WBC # FLD AUTO: 7.45 K/UL — SIGNIFICANT CHANGE UP (ref 4.8–10.8)

## 2024-06-15 PROCEDURE — 99239 HOSP IP/OBS DSCHRG MGMT >30: CPT

## 2024-06-15 RX ADMIN — Medication 650 MILLIGRAM(S): at 01:18

## 2024-06-15 RX ADMIN — ARIPIPRAZOLE 5 MILLIGRAM(S): 2 TABLET ORAL at 12:42

## 2024-06-15 RX ADMIN — Medication 40 MILLIGRAM(S): at 06:55

## 2024-06-15 RX ADMIN — BUDESONIDE AND FORMOTEROL FUMARATE DIHYDRATE 2 PUFF(S): 80; 4.5 AEROSOL RESPIRATORY (INHALATION) at 12:43

## 2024-06-15 RX ADMIN — NICOTINE POLACRILEX 1 PATCH: 4 GUM, CHEWING ORAL at 12:41

## 2024-06-15 RX ADMIN — NICOTINE POLACRILEX 1 PATCH: 4 GUM, CHEWING ORAL at 11:45

## 2024-06-15 RX ADMIN — Medication 650 MILLIGRAM(S): at 06:55

## 2024-06-15 RX ADMIN — TRIHEXYPHENIDYL HYDROCHLORIDE 2 MILLIGRAM(S): 2 TABLET ORAL at 06:56

## 2024-06-15 RX ADMIN — LISINOPRIL 10 MILLIGRAM(S): 5 TABLET ORAL at 06:55

## 2024-06-15 RX ADMIN — ENOXAPARIN SODIUM 40 MILLIGRAM(S): 100 INJECTION SUBCUTANEOUS at 06:57

## 2024-06-15 RX ADMIN — PREDNISONE 40 MILLIGRAM(S): 20 TABLET ORAL at 06:56

## 2024-06-21 DIAGNOSIS — I89.0 LYMPHEDEMA, NOT ELSEWHERE CLASSIFIED: ICD-10-CM

## 2024-06-21 DIAGNOSIS — F17.210 NICOTINE DEPENDENCE, CIGARETTES, UNCOMPLICATED: ICD-10-CM

## 2024-06-21 DIAGNOSIS — J96.12 CHRONIC RESPIRATORY FAILURE WITH HYPERCAPNIA: ICD-10-CM

## 2024-06-21 DIAGNOSIS — I11.0 HYPERTENSIVE HEART DISEASE WITH HEART FAILURE: ICD-10-CM

## 2024-06-21 DIAGNOSIS — J44.1 CHRONIC OBSTRUCTIVE PULMONARY DISEASE WITH (ACUTE) EXACERBATION: ICD-10-CM

## 2024-06-21 DIAGNOSIS — J96.21 ACUTE AND CHRONIC RESPIRATORY FAILURE WITH HYPOXIA: ICD-10-CM

## 2024-06-21 DIAGNOSIS — Z99.81 DEPENDENCE ON SUPPLEMENTAL OXYGEN: ICD-10-CM

## 2024-06-21 DIAGNOSIS — I87.2 VENOUS INSUFFICIENCY (CHRONIC) (PERIPHERAL): ICD-10-CM

## 2024-06-21 DIAGNOSIS — I50.32 CHRONIC DIASTOLIC (CONGESTIVE) HEART FAILURE: ICD-10-CM

## 2024-06-21 DIAGNOSIS — Z88.0 ALLERGY STATUS TO PENICILLIN: ICD-10-CM

## 2024-06-21 DIAGNOSIS — E78.5 HYPERLIPIDEMIA, UNSPECIFIED: ICD-10-CM

## 2024-06-21 DIAGNOSIS — E66.2 MORBID (SEVERE) OBESITY WITH ALVEOLAR HYPOVENTILATION: ICD-10-CM

## 2024-06-21 DIAGNOSIS — Z91.012 ALLERGY TO EGGS: ICD-10-CM

## 2024-06-21 DIAGNOSIS — F31.9 BIPOLAR DISORDER, UNSPECIFIED: ICD-10-CM

## 2024-06-21 DIAGNOSIS — R91.8 OTHER NONSPECIFIC ABNORMAL FINDING OF LUNG FIELD: ICD-10-CM

## 2024-07-10 ENCOUNTER — APPOINTMENT (OUTPATIENT)
Dept: GASTROENTEROLOGY | Facility: CLINIC | Age: 69
End: 2024-07-10
Payer: MEDICAID

## 2024-07-10 ENCOUNTER — OUTPATIENT (OUTPATIENT)
Dept: OUTPATIENT SERVICES | Facility: HOSPITAL | Age: 69
LOS: 1 days | End: 2024-07-10
Payer: MEDICAID

## 2024-07-10 VITALS
HEIGHT: 64 IN | TEMPERATURE: 97 F | DIASTOLIC BLOOD PRESSURE: 82 MMHG | BODY MASS INDEX: 24.07 KG/M2 | WEIGHT: 141 LBS | HEART RATE: 77 BPM | SYSTOLIC BLOOD PRESSURE: 146 MMHG | OXYGEN SATURATION: 96 %

## 2024-07-10 DIAGNOSIS — Z00.00 ENCOUNTER FOR GENERAL ADULT MEDICAL EXAMINATION WITHOUT ABNORMAL FINDINGS: ICD-10-CM

## 2024-07-10 DIAGNOSIS — Z98.891 HISTORY OF UTERINE SCAR FROM PREVIOUS SURGERY: Chronic | ICD-10-CM

## 2024-07-10 DIAGNOSIS — Z98.890 OTHER SPECIFIED POSTPROCEDURAL STATES: Chronic | ICD-10-CM

## 2024-07-10 DIAGNOSIS — D50.9 IRON DEFICIENCY ANEMIA, UNSPECIFIED: ICD-10-CM

## 2024-07-10 PROCEDURE — 99203 OFFICE O/P NEW LOW 30 MIN: CPT

## 2024-07-10 PROCEDURE — 99213 OFFICE O/P EST LOW 20 MIN: CPT

## 2024-07-11 PROBLEM — D50.9 IRON DEFICIENCY ANEMIA, UNSPECIFIED IRON DEFICIENCY ANEMIA TYPE: Status: ACTIVE | Noted: 2024-07-10

## 2024-07-11 RX ORDER — POLYETHYLENE GLYCOL 3350 AND ELECTROLYTES WITH LEMON FLAVOR 236; 22.74; 6.74; 5.86; 2.97 G/4L; G/4L; G/4L; G/4L; G/4L
236 POWDER, FOR SOLUTION ORAL
Qty: 1 | Refills: 0 | Status: ACTIVE | COMMUNITY
Start: 2024-07-10 | End: 1900-01-01

## 2024-07-17 DIAGNOSIS — D50.9 IRON DEFICIENCY ANEMIA, UNSPECIFIED: ICD-10-CM

## 2024-07-23 ENCOUNTER — INPATIENT (INPATIENT)
Facility: HOSPITAL | Age: 69
LOS: 6 days | Discharge: NURSING, REHAB OR LONG-TERM CARE | DRG: 204 | End: 2024-07-30
Attending: STUDENT IN AN ORGANIZED HEALTH CARE EDUCATION/TRAINING PROGRAM | Admitting: INTERNAL MEDICINE
Payer: MEDICARE

## 2024-07-23 VITALS
RESPIRATION RATE: 18 BRPM | SYSTOLIC BLOOD PRESSURE: 120 MMHG | TEMPERATURE: 99 F | OXYGEN SATURATION: 99 % | DIASTOLIC BLOOD PRESSURE: 65 MMHG | HEART RATE: 66 BPM | HEIGHT: 64 IN | WEIGHT: 179.9 LBS

## 2024-07-23 DIAGNOSIS — Z98.891 HISTORY OF UTERINE SCAR FROM PREVIOUS SURGERY: Chronic | ICD-10-CM

## 2024-07-23 DIAGNOSIS — R06.02 SHORTNESS OF BREATH: ICD-10-CM

## 2024-07-23 DIAGNOSIS — Z98.890 OTHER SPECIFIED POSTPROCEDURAL STATES: Chronic | ICD-10-CM

## 2024-07-23 LAB
ALBUMIN SERPL ELPH-MCNC: 3.9 G/DL — SIGNIFICANT CHANGE UP (ref 3.5–5.2)
ALP SERPL-CCNC: 116 U/L — HIGH (ref 30–115)
ALT FLD-CCNC: 12 U/L — SIGNIFICANT CHANGE UP (ref 0–41)
ANION GAP SERPL CALC-SCNC: 8 MMOL/L — SIGNIFICANT CHANGE UP (ref 7–14)
AST SERPL-CCNC: 29 U/L — SIGNIFICANT CHANGE UP (ref 0–41)
BASOPHILS # BLD AUTO: 0.03 K/UL — SIGNIFICANT CHANGE UP (ref 0–0.2)
BASOPHILS NFR BLD AUTO: 0.9 % — SIGNIFICANT CHANGE UP (ref 0–1)
BILIRUB SERPL-MCNC: 0.2 MG/DL — SIGNIFICANT CHANGE UP (ref 0.2–1.2)
BUN SERPL-MCNC: 6 MG/DL — LOW (ref 10–20)
CALCIUM SERPL-MCNC: 9.5 MG/DL — SIGNIFICANT CHANGE UP (ref 8.4–10.5)
CHLORIDE SERPL-SCNC: 95 MMOL/L — LOW (ref 98–110)
CO2 SERPL-SCNC: 33 MMOL/L — HIGH (ref 17–32)
CREAT SERPL-MCNC: 0.5 MG/DL — LOW (ref 0.7–1.5)
EGFR: 102 ML/MIN/1.73M2 — SIGNIFICANT CHANGE UP
EOSINOPHIL # BLD AUTO: 0.03 K/UL — SIGNIFICANT CHANGE UP (ref 0–0.7)
EOSINOPHIL NFR BLD AUTO: 0.9 % — SIGNIFICANT CHANGE UP (ref 0–8)
FLUAV AG NPH QL: SIGNIFICANT CHANGE UP
FLUBV AG NPH QL: SIGNIFICANT CHANGE UP
GLUCOSE SERPL-MCNC: 163 MG/DL — HIGH (ref 70–99)
HCT VFR BLD CALC: 43.2 % — SIGNIFICANT CHANGE UP (ref 37–47)
HGB BLD-MCNC: 13.2 G/DL — SIGNIFICANT CHANGE UP (ref 12–16)
LYMPHOCYTES # BLD AUTO: 0.47 K/UL — LOW (ref 1.2–3.4)
LYMPHOCYTES # BLD AUTO: 12.3 % — LOW (ref 20.5–51.1)
MCHC RBC-ENTMCNC: 24 PG — LOW (ref 27–31)
MCHC RBC-ENTMCNC: 30.6 G/DL — LOW (ref 32–37)
MCV RBC AUTO: 78.7 FL — LOW (ref 81–99)
MONOCYTES # BLD AUTO: 0.1 K/UL — SIGNIFICANT CHANGE UP (ref 0.1–0.6)
MONOCYTES NFR BLD AUTO: 2.6 % — SIGNIFICANT CHANGE UP (ref 1.7–9.3)
NEUTROPHILS # BLD AUTO: 2.83 K/UL — SIGNIFICANT CHANGE UP (ref 1.4–6.5)
NEUTROPHILS NFR BLD AUTO: 74.5 % — SIGNIFICANT CHANGE UP (ref 42.2–75.2)
PLATELET # BLD AUTO: 265 K/UL — SIGNIFICANT CHANGE UP (ref 130–400)
PMV BLD: 9 FL — SIGNIFICANT CHANGE UP (ref 7.4–10.4)
POTASSIUM SERPL-MCNC: 5.4 MMOL/L — HIGH (ref 3.5–5)
POTASSIUM SERPL-SCNC: 5.4 MMOL/L — HIGH (ref 3.5–5)
PROT SERPL-MCNC: 6.9 G/DL — SIGNIFICANT CHANGE UP (ref 6–8)
RBC # BLD: 5.49 M/UL — HIGH (ref 4.2–5.4)
RBC # FLD: 22.7 % — HIGH (ref 11.5–14.5)
RSV RNA NPH QL NAA+NON-PROBE: SIGNIFICANT CHANGE UP
SARS-COV-2 RNA SPEC QL NAA+PROBE: DETECTED
SODIUM SERPL-SCNC: 136 MMOL/L — SIGNIFICANT CHANGE UP (ref 135–146)
WBC # BLD: 3.8 K/UL — LOW (ref 4.8–10.8)
WBC # FLD AUTO: 3.8 K/UL — LOW (ref 4.8–10.8)

## 2024-07-23 PROCEDURE — 71045 X-RAY EXAM CHEST 1 VIEW: CPT

## 2024-07-23 PROCEDURE — 93970 EXTREMITY STUDY: CPT

## 2024-07-23 PROCEDURE — 36415 COLL VENOUS BLD VENIPUNCTURE: CPT

## 2024-07-23 PROCEDURE — 99285 EMERGENCY DEPT VISIT HI MDM: CPT

## 2024-07-23 PROCEDURE — 85025 COMPLETE CBC W/AUTO DIFF WBC: CPT

## 2024-07-23 PROCEDURE — 83615 LACTATE (LD) (LDH) ENZYME: CPT

## 2024-07-23 PROCEDURE — 85379 FIBRIN DEGRADATION QUANT: CPT

## 2024-07-23 PROCEDURE — 74018 RADEX ABDOMEN 1 VIEW: CPT | Mod: 26

## 2024-07-23 PROCEDURE — 84145 PROCALCITONIN (PCT): CPT

## 2024-07-23 PROCEDURE — 85610 PROTHROMBIN TIME: CPT

## 2024-07-23 PROCEDURE — 80053 COMPREHEN METABOLIC PANEL: CPT

## 2024-07-23 PROCEDURE — 97116 GAIT TRAINING THERAPY: CPT | Mod: GP

## 2024-07-23 PROCEDURE — 97162 PT EVAL MOD COMPLEX 30 MIN: CPT | Mod: GP

## 2024-07-23 PROCEDURE — 80076 HEPATIC FUNCTION PANEL: CPT

## 2024-07-23 PROCEDURE — 86140 C-REACTIVE PROTEIN: CPT

## 2024-07-23 PROCEDURE — 82565 ASSAY OF CREATININE: CPT

## 2024-07-23 PROCEDURE — 71045 X-RAY EXAM CHEST 1 VIEW: CPT | Mod: 26

## 2024-07-23 PROCEDURE — 85652 RBC SED RATE AUTOMATED: CPT

## 2024-07-23 PROCEDURE — 84100 ASSAY OF PHOSPHORUS: CPT

## 2024-07-23 PROCEDURE — 94640 AIRWAY INHALATION TREATMENT: CPT

## 2024-07-23 PROCEDURE — 97110 THERAPEUTIC EXERCISES: CPT | Mod: GP

## 2024-07-23 PROCEDURE — 83735 ASSAY OF MAGNESIUM: CPT

## 2024-07-23 RX ORDER — NICOTINE 21 MG/24HR
1 PATCH, TRANSDERMAL 24 HOURS TRANSDERMAL DAILY
Refills: 0 | Status: DISCONTINUED | OUTPATIENT
Start: 2024-07-23 | End: 2024-07-30

## 2024-07-23 RX ORDER — TIOTROPIUM BROMIDE 18 UG/1
2 CAPSULE ORAL; RESPIRATORY (INHALATION) DAILY
Refills: 0 | Status: DISCONTINUED | OUTPATIENT
Start: 2024-07-23 | End: 2024-07-30

## 2024-07-23 RX ORDER — ASPIRIN 325 MG
5 TABLET ORAL AT BEDTIME
Refills: 0 | Status: DISCONTINUED | OUTPATIENT
Start: 2024-07-23 | End: 2024-07-30

## 2024-07-23 RX ORDER — DEXAMETHASONE 1.5 MG/1
10 TABLET ORAL ONCE
Refills: 0 | Status: COMPLETED | OUTPATIENT
Start: 2024-07-23 | End: 2024-07-23

## 2024-07-23 RX ORDER — REMDESIVIR 100 MG/1
INJECTION, POWDER, LYOPHILIZED, FOR SOLUTION INTRAVENOUS
Refills: 0 | Status: COMPLETED | OUTPATIENT
Start: 2024-07-24 | End: 2024-07-27

## 2024-07-23 RX ORDER — PANTOPRAZOLE SODIUM 20 MG/1
40 TABLET, DELAYED RELEASE ORAL
Refills: 0 | Status: DISCONTINUED | OUTPATIENT
Start: 2024-07-23 | End: 2024-07-30

## 2024-07-23 RX ORDER — REMDESIVIR 100 MG/1
200 INJECTION, POWDER, LYOPHILIZED, FOR SOLUTION INTRAVENOUS EVERY 24 HOURS
Refills: 0 | Status: COMPLETED | OUTPATIENT
Start: 2024-07-23 | End: 2024-07-24

## 2024-07-23 RX ORDER — LISINOPRIL 10 MG/1
10 TABLET ORAL DAILY
Refills: 0 | Status: DISCONTINUED | OUTPATIENT
Start: 2024-07-23 | End: 2024-07-30

## 2024-07-23 RX ORDER — BUDESONIDE AND FORMOTEROL FUMARATE DIHYDRATE 80; 4.5 UG/1; UG/1
2 AEROSOL RESPIRATORY (INHALATION)
Refills: 0 | Status: DISCONTINUED | OUTPATIENT
Start: 2024-07-23 | End: 2024-07-30

## 2024-07-23 RX ORDER — DEXAMETHASONE 1.5 MG/1
6 TABLET ORAL DAILY
Refills: 0 | Status: DISCONTINUED | OUTPATIENT
Start: 2024-07-24 | End: 2024-07-24

## 2024-07-23 RX ORDER — ALBUTEROL 90 MCG
2 AEROSOL REFILL (GRAM) INHALATION EVERY 6 HOURS
Refills: 0 | Status: DISCONTINUED | OUTPATIENT
Start: 2024-07-23 | End: 2024-07-30

## 2024-07-23 RX ORDER — REMDESIVIR 100 MG/1
100 INJECTION, POWDER, LYOPHILIZED, FOR SOLUTION INTRAVENOUS EVERY 24 HOURS
Refills: 0 | Status: COMPLETED | OUTPATIENT
Start: 2024-07-24 | End: 2024-07-27

## 2024-07-23 RX ORDER — ARIPIPRAZOLE 15 MG/1
5 TABLET ORAL DAILY
Refills: 0 | Status: DISCONTINUED | OUTPATIENT
Start: 2024-07-23 | End: 2024-07-26

## 2024-07-23 RX ORDER — ENOXAPARIN SODIUM 120 MG/.8ML
40 INJECTION SUBCUTANEOUS EVERY 24 HOURS
Refills: 0 | Status: DISCONTINUED | OUTPATIENT
Start: 2024-07-23 | End: 2024-07-30

## 2024-07-23 RX ORDER — IPRATROPIUM BROMIDE AND ALBUTEROL SULFATE 2.5; .5 MG/3ML; MG/3ML
3 SOLUTION RESPIRATORY (INHALATION) ONCE
Refills: 0 | Status: COMPLETED | OUTPATIENT
Start: 2024-07-23 | End: 2024-07-23

## 2024-07-23 RX ORDER — TRIHEXYPHENIDYL HCL 5 MG
2 TABLET ORAL
Refills: 0 | Status: DISCONTINUED | OUTPATIENT
Start: 2024-07-23 | End: 2024-07-30

## 2024-07-23 RX ORDER — METHYLPREDNISOLONE ACETATE 40 MG/ML
40 INJECTION, SUSPENSION INTRA-ARTICULAR; INTRALESIONAL; INTRAMUSCULAR; INTRASYNOVIAL; SOFT TISSUE
Refills: 0 | Status: DISCONTINUED | OUTPATIENT
Start: 2024-07-23 | End: 2024-07-23

## 2024-07-23 RX ADMIN — IPRATROPIUM BROMIDE AND ALBUTEROL SULFATE 3 MILLILITER(S): 2.5; .5 SOLUTION RESPIRATORY (INHALATION) at 12:17

## 2024-07-23 RX ADMIN — Medication 5 MILLIGRAM(S): at 22:29

## 2024-07-23 RX ADMIN — DEXAMETHASONE 10 MILLIGRAM(S): 1.5 TABLET ORAL at 12:17

## 2024-07-23 NOTE — H&P ADULT - HISTORY OF PRESENT ILLNESS
Case of 68-year-old female with past medical history of CHF, bipolar, lymphadenopathy, asthma, COPD, on as needed O2 at home, presents to the ED due to positive COVID test and shortness of breath.    Patient has been having some cough, congestion for the last 2 days, get COVID tested today that showed positive.    Per nurse patient did not look right and sent patient to ED.  Denies any active chest pain, abdominal pain, nausea vomiting, dysuria or hematuria.    In ED:  Vital signs: 120/65,  66,  98.6  WBC 3.8  Hb 13.2  Platelets 265  Crea 0.5  K 5.4 (hemolyzed)     CXR : No radiographic evidence of acute cardiopulmonary disease.  SO2 was 89% on RA, placed on 2L nc.          Case of 68-year-old female with past medical history of  HPEF, bipolar disorder, asthma/COPD, on as needed O2 at home, presents to the ED due to positive COVID test and shortness of breath.    Patient has been having some cough, congestion for the last 2 days, get COVID tested today that showed positive.    Per nurse patient did not look right and sent patient to ED.    Patient denies any active chest pain, abdominal pain, nausea vomiting, dysuria or hematuria. No fever or chills, no productive cough, no phlegm     In ED:  Vital signs: 120/65,  66,  98.6  WBC 3.8  Hb 13.2  Platelets 265  Crea 0.5  K 5.4 (hemolyzed)     CXR : No radiographic evidence of acute cardiopulmonary disease.  SO2 was 89% on RA, placed on 2L nc.   Given inhalers and Decadron tablet in ED.        Case of 68-year-old female with past medical history of  HFPEF, bipolar disorder, asthma/COPD, on as needed O2 at home, presents to the ED due to positive COVID test and shortness of breath.    Patient has been having some cough, congestion for the last 2 days, get COVID tested today that showed positive.    Per nurse patient did not look right and sent patient to ED.    Patient denies any active chest pain, abdominal pain, nausea vomiting, dysuria or hematuria. No fever or chills, no productive cough, no phlegm     In ED:  Vital signs: 120/65,  66,  98.6  WBC 3.8  Hb 13.2  Platelets 265  Crea 0.5  K 5.4 (hemolyzed)     CXR : No radiographic evidence of acute cardiopulmonary disease.  SO2 was 89% on RA, placed on 2L nc.   Given inhalers and Decadron tablet in ED.

## 2024-07-23 NOTE — ED ADULT NURSE NOTE - OBJECTIVE STATEMENT
Pt BIBEMS from [facility] reports shortness of breath for two days ago. + COVID two days ago. No fever cough congestion

## 2024-07-23 NOTE — H&P ADULT - PATIENT'S PREFERRED PRONOUN
Her/She
see inteval data; destroyed room; made weapon; threatened staff;  may be antisocial.
see inteval data; destroyed room; made weapon; threatened staff;  may be antisocial.

## 2024-07-23 NOTE — ED PROVIDER NOTE - CLINICAL SUMMARY MEDICAL DECISION MAKING FREE TEXT BOX
Patient with shortness of breath after recently testing positive for COVID 2 days ago.  Here patient with bilateral wheezing no increased work of breathing.  After neb steroids.  Patient is hypoxic will require admission for further evaluation and treatment.

## 2024-07-23 NOTE — ED PROVIDER NOTE - OBJECTIVE STATEMENT
68-year-old female with past medical history of CHF, bipolar, lymphadenopathy, asthma, COPD, on as needed O2 at home, presents to the ED due to positive COVID test and shortness of breath.  Patient has been having some cough, congestion for the last 2 days, get COVID tested today that showed positive.  Per nurse patient did not look right and sent patient to ED.  Denies any active chest pain, abdominal pain, nausea vomiting, dysuria or hematuria.

## 2024-07-23 NOTE — ED PROVIDER NOTE - WR ORDER STATUS 2

## 2024-07-23 NOTE — ED ADULT TRIAGE NOTE - CHIEF COMPLAINT QUOTE
Pt BIBEMS from [facility] reports shortness of breath for two days ago. + COVID two days ago. No fever cough congestion

## 2024-07-23 NOTE — ED PROVIDER NOTE - PROGRESS NOTE DETAILS
Is This A New Presentation, Or A Follow-Up?: Skin Lesions How Severe Is Your Skin Lesion?: mild Have Your Skin Lesions Been Treated?: not been treated CARLEEN Stewart MD:  Went to reassess patient, patient satting at 89/90% in room air.  Noted with diminished breath sounds positive wheezing but improved since arrival.  Labs to be drawn and pt. to be admitted due to increased O2 needs in setting of covid.

## 2024-07-23 NOTE — ED ADULT NURSE REASSESSMENT NOTE - NS ED NURSE REASSESS COMMENT FT1
Attempted to give report at 1815. Put on hold by Unit clerk w/ no response from RN.   Attempted to give report at 1930. Put on hold by Unit clerk w/ no response from RN.   Advised Charge RN. Informed Transporter to request 4a RN to call ED back for report when available.

## 2024-07-23 NOTE — H&P ADULT - ATTENDING COMMENTS
Assessment    Asthma/COPD exacerbation with AHRF secondary to COVID, uses 2 liters NC at home PRN but now has been needing it standing  HFpEF  Elevated bicarb likely chronic CO2 retention, doubt contraction alkalosis considering volume status  Chronic venous stasis dermatitis  HTN  Bipolar    Plan    - no consolidations on xray, doubt bacterial superinfection, will do remdesivir / dexamethasone, f/u ID, c/w home inhalers  - duplex of lower extremities  - wound care nurse for recommendations, avoid wrapping until after duplex (if DVT confirmed negative)  - c/w home abilify/artane    Patient was placed in a room next to a patient without COVID.  Staff stated that they're aware and bed management knows,  Called bed mgmt but not picking up at the moment.    Pending: improvement in hypoxia, duplex, move to a private room ideally, remdesivir, ID f/u    # DVT PPX: lovenox    Seen 7/24

## 2024-07-23 NOTE — H&P ADULT - NSHPPHYSICALEXAM_GEN_ALL_CORE
Patient is AAO*3  Regular S1S2, no added sounds  Decrease bilateral air entry, no wheezing appreciated, no resp distress   Soft abdomen, large abdominal hernia, +ve BS  Bilateral lower limb edema, more on the left side

## 2024-07-23 NOTE — PATIENT PROFILE ADULT - FALL HARM RISK - HARM RISK INTERVENTIONS

## 2024-07-23 NOTE — ED ADULT NURSE NOTE - NS ED NURSE REPORT GIVEN TO FT
Attempted x2 at 1815 and 1930. Rn refusing to  Attempted x2 at 1815 and 1930. Rn refusing to . Report given to ANNIE Alcantar at 2052

## 2024-07-23 NOTE — ED ADULT NURSE NOTE - NSFALLRISKINTERV_ED_ALL_ED

## 2024-07-23 NOTE — ED ADULT NURSE NOTE - CHIEF COMPLAINT QUOTE
Pt BIBEMS from [facility] reports shortness of breath for two days ago. + COVID two days ago. No fever cough congestion

## 2024-07-23 NOTE — H&P ADULT - ASSESSMENT
Case of 68-year-old female with past medical history of COPD on oxygen as needed, hypertension, hyperlipidemia, and CHF who presents to the ED for SOB. Tested covid 2 days ago.    COVID   Acute on chronic hypoxic resp failure   COPD exacerbation   -BiPAP w/ O2 PRN ( patient refusing at time of eval - aware of importance)  -IV steroids and bronchodilator  -Azithromycin   -f/u procal   -c/w ICS / LABA     h/o HFpEF -EF 60%    HTN  -c/w OP Rx  -low salt diet / fluid restriction     Dyslipidemia   -statin     Bipolar disorder  -c/w Abilify / Artane     DVT prophylaxis      Case of 68-year-old female with past medical history of Asthma/COPD on oxygen as needed, hypertension, and CHF who presents to the ED for SOB. Tested +ve for covid.    #COVID infection   #COPD exacerbation, mild   -Patient is on NC 2L/min, no resp distress, speaks in full sentences. Wean off O2. Target SO2 88-92%    -Patient uses O2 at home as needed. No increase O2 requirements.   -c/w inhalers, Albuterol as needed. Standing LABA/ICS and LAMA  -Prednisone 40 mg daily  -no sputum production, CXR normal, monitor off antibiotics, f/u procal    -F/U RVP (covid test was done as op)     #Chronic venous stasis  -Bilateral lower limb edema, more on the left  -Duplex LE venous, check for DVT in the setting of acute covid infection     #H/x of HFpEF  -TTE 5/5/24 EF of 60%. Mild (grade 1) diastolic dysfunction. No regional wall motion abnormalities  -not in exacerbation     #HTN  -c/w home meds    #Bipolar disorder  -c/w Abilify / Artane     DVT prophylaxis: Lovenox 40 mg daily sq  PPT ppx: Pantoprazole 40 mg daily  Diet: DASH

## 2024-07-24 LAB
ALBUMIN SERPL ELPH-MCNC: 4.1 G/DL — SIGNIFICANT CHANGE UP (ref 3.5–5.2)
ALBUMIN SERPL ELPH-MCNC: 4.2 G/DL — SIGNIFICANT CHANGE UP (ref 3.5–5.2)
ALBUMIN SERPL ELPH-MCNC: 4.2 G/DL — SIGNIFICANT CHANGE UP (ref 3.5–5.2)
ALP SERPL-CCNC: 110 U/L — SIGNIFICANT CHANGE UP (ref 30–115)
ALP SERPL-CCNC: 113 U/L — SIGNIFICANT CHANGE UP (ref 30–115)
ALP SERPL-CCNC: 115 U/L — SIGNIFICANT CHANGE UP (ref 30–115)
ALT FLD-CCNC: 11 U/L — SIGNIFICANT CHANGE UP (ref 0–41)
ALT FLD-CCNC: 11 U/L — SIGNIFICANT CHANGE UP (ref 0–41)
ALT FLD-CCNC: 13 U/L — SIGNIFICANT CHANGE UP (ref 0–41)
ANION GAP SERPL CALC-SCNC: 13 MMOL/L — SIGNIFICANT CHANGE UP (ref 7–14)
AST SERPL-CCNC: 11 U/L — SIGNIFICANT CHANGE UP (ref 0–41)
AST SERPL-CCNC: 12 U/L — SIGNIFICANT CHANGE UP (ref 0–41)
AST SERPL-CCNC: 13 U/L — SIGNIFICANT CHANGE UP (ref 0–41)
BASOPHILS # BLD AUTO: 0.02 K/UL — SIGNIFICANT CHANGE UP (ref 0–0.2)
BASOPHILS NFR BLD AUTO: 0.3 % — SIGNIFICANT CHANGE UP (ref 0–1)
BILIRUB DIRECT SERPL-MCNC: <0.2 MG/DL — SIGNIFICANT CHANGE UP (ref 0–0.3)
BILIRUB DIRECT SERPL-MCNC: <0.2 MG/DL — SIGNIFICANT CHANGE UP (ref 0–0.3)
BILIRUB INDIRECT FLD-MCNC: SIGNIFICANT CHANGE UP MG/DL (ref 0.2–1.2)
BILIRUB INDIRECT FLD-MCNC: SIGNIFICANT CHANGE UP MG/DL (ref 0.2–1.2)
BILIRUB SERPL-MCNC: <0.2 MG/DL — SIGNIFICANT CHANGE UP (ref 0.2–1.2)
BUN SERPL-MCNC: 10 MG/DL — SIGNIFICANT CHANGE UP (ref 10–20)
CALCIUM SERPL-MCNC: 9.8 MG/DL — SIGNIFICANT CHANGE UP (ref 8.4–10.5)
CHLORIDE SERPL-SCNC: 97 MMOL/L — LOW (ref 98–110)
CO2 SERPL-SCNC: 25 MMOL/L — SIGNIFICANT CHANGE UP (ref 17–32)
CREAT SERPL-MCNC: 0.5 MG/DL — LOW (ref 0.7–1.5)
CREAT SERPL-MCNC: <0.5 MG/DL — LOW (ref 0.7–1.5)
CRP SERPL-MCNC: 9.7 MG/L — HIGH
D DIMER BLD IA.RAPID-MCNC: 277 NG/ML DDU — HIGH
EGFR: 102 ML/MIN/1.73M2 — SIGNIFICANT CHANGE UP
EGFR: 108 ML/MIN/1.73M2 — SIGNIFICANT CHANGE UP
EOSINOPHIL # BLD AUTO: 0 K/UL — SIGNIFICANT CHANGE UP (ref 0–0.7)
EOSINOPHIL NFR BLD AUTO: 0 % — SIGNIFICANT CHANGE UP (ref 0–8)
ERYTHROCYTE [SEDIMENTATION RATE] IN BLOOD: 15 MM/HR — SIGNIFICANT CHANGE UP (ref 0–20)
GLUCOSE SERPL-MCNC: 82 MG/DL — SIGNIFICANT CHANGE UP (ref 70–99)
HCT VFR BLD CALC: 43.8 % — SIGNIFICANT CHANGE UP (ref 37–47)
HGB BLD-MCNC: 13.6 G/DL — SIGNIFICANT CHANGE UP (ref 12–16)
IMM GRANULOCYTES NFR BLD AUTO: 0.5 % — HIGH (ref 0.1–0.3)
INR BLD: 0.92 RATIO — SIGNIFICANT CHANGE UP (ref 0.65–1.3)
INR BLD: 1.01 RATIO — SIGNIFICANT CHANGE UP (ref 0.65–1.3)
LYMPHOCYTES # BLD AUTO: 1 K/UL — LOW (ref 1.2–3.4)
LYMPHOCYTES # BLD AUTO: 16.7 % — LOW (ref 20.5–51.1)
MAGNESIUM SERPL-MCNC: 2.8 MG/DL — HIGH (ref 1.8–2.4)
MCHC RBC-ENTMCNC: 24 PG — LOW (ref 27–31)
MCHC RBC-ENTMCNC: 31.1 G/DL — LOW (ref 32–37)
MCV RBC AUTO: 77.2 FL — LOW (ref 81–99)
MONOCYTES # BLD AUTO: 0.29 K/UL — SIGNIFICANT CHANGE UP (ref 0.1–0.6)
MONOCYTES NFR BLD AUTO: 4.8 % — SIGNIFICANT CHANGE UP (ref 1.7–9.3)
NEUTROPHILS # BLD AUTO: 4.66 K/UL — SIGNIFICANT CHANGE UP (ref 1.4–6.5)
NEUTROPHILS NFR BLD AUTO: 77.7 % — HIGH (ref 42.2–75.2)
NRBC # BLD: 0 /100 WBCS — SIGNIFICANT CHANGE UP (ref 0–0)
PLATELET # BLD AUTO: 309 K/UL — SIGNIFICANT CHANGE UP (ref 130–400)
PMV BLD: 9.2 FL — SIGNIFICANT CHANGE UP (ref 7.4–10.4)
POTASSIUM SERPL-MCNC: 5.3 MMOL/L — HIGH (ref 3.5–5)
POTASSIUM SERPL-SCNC: 5.3 MMOL/L — HIGH (ref 3.5–5)
PROT SERPL-MCNC: 6.6 G/DL — SIGNIFICANT CHANGE UP (ref 6–8)
PROT SERPL-MCNC: 6.9 G/DL — SIGNIFICANT CHANGE UP (ref 6–8)
PROT SERPL-MCNC: 7 G/DL — SIGNIFICANT CHANGE UP (ref 6–8)
PROTHROM AB SERPL-ACNC: 10.5 SEC — SIGNIFICANT CHANGE UP (ref 9.95–12.87)
PROTHROM AB SERPL-ACNC: 11.5 SEC — SIGNIFICANT CHANGE UP (ref 9.95–12.87)
RBC # BLD: 5.67 M/UL — HIGH (ref 4.2–5.4)
RBC # FLD: 22.5 % — HIGH (ref 11.5–14.5)
SODIUM SERPL-SCNC: 135 MMOL/L — SIGNIFICANT CHANGE UP (ref 135–146)
WBC # BLD: 6 K/UL — SIGNIFICANT CHANGE UP (ref 4.8–10.8)
WBC # FLD AUTO: 6 K/UL — SIGNIFICANT CHANGE UP (ref 4.8–10.8)

## 2024-07-24 PROCEDURE — 99223 1ST HOSP IP/OBS HIGH 75: CPT

## 2024-07-24 PROCEDURE — 93970 EXTREMITY STUDY: CPT | Mod: 26

## 2024-07-24 RX ORDER — BENZONATATE 100 MG/1
100 CAPSULE, LIQUID FILLED ORAL THREE TIMES A DAY
Refills: 0 | Status: DISCONTINUED | OUTPATIENT
Start: 2024-07-24 | End: 2024-07-30

## 2024-07-24 RX ORDER — CHLORHEXIDINE GLUCONATE 500 MG/1
1 CLOTH TOPICAL DAILY
Refills: 0 | Status: DISCONTINUED | OUTPATIENT
Start: 2024-07-24 | End: 2024-07-30

## 2024-07-24 RX ORDER — DEXAMETHASONE 1.5 MG/1
6 TABLET ORAL DAILY
Refills: 0 | Status: DISCONTINUED | OUTPATIENT
Start: 2024-07-24 | End: 2024-07-24

## 2024-07-24 RX ORDER — LISINOPRIL 10 MG/1
20 TABLET ORAL DAILY
Refills: 0 | Status: DISCONTINUED | OUTPATIENT
Start: 2024-07-25 | End: 2024-07-30

## 2024-07-24 RX ORDER — DEXAMETHASONE 1.5 MG/1
6 TABLET ORAL DAILY
Refills: 0 | Status: DISCONTINUED | OUTPATIENT
Start: 2024-07-25 | End: 2024-07-30

## 2024-07-24 RX ORDER — LISINOPRIL 10 MG/1
10 TABLET ORAL ONCE
Refills: 0 | Status: COMPLETED | OUTPATIENT
Start: 2024-07-24 | End: 2024-07-24

## 2024-07-24 RX ORDER — GUAIFENESIN/DEXTROMETHORPHAN 100-10MG/5
10 SYRUP ORAL EVERY 6 HOURS
Refills: 0 | Status: DISCONTINUED | OUTPATIENT
Start: 2024-07-24 | End: 2024-07-30

## 2024-07-24 RX ORDER — ACETAMINOPHEN 500 MG
650 TABLET ORAL EVERY 6 HOURS
Refills: 0 | Status: DISCONTINUED | OUTPATIENT
Start: 2024-07-24 | End: 2024-07-30

## 2024-07-24 RX ADMIN — Medication 1 PATCH: at 11:09

## 2024-07-24 RX ADMIN — BUDESONIDE AND FORMOTEROL FUMARATE DIHYDRATE 2 PUFF(S): 80; 4.5 AEROSOL RESPIRATORY (INHALATION) at 11:05

## 2024-07-24 RX ADMIN — Medication 10 MILLILITER(S): at 11:59

## 2024-07-24 RX ADMIN — LISINOPRIL 10 MILLIGRAM(S): 10 TABLET ORAL at 06:30

## 2024-07-24 RX ADMIN — Medication 1 PATCH: at 21:43

## 2024-07-24 RX ADMIN — LISINOPRIL 10 MILLIGRAM(S): 10 TABLET ORAL at 16:25

## 2024-07-24 RX ADMIN — ARIPIPRAZOLE 5 MILLIGRAM(S): 15 TABLET ORAL at 11:05

## 2024-07-24 RX ADMIN — Medication 2 MILLIGRAM(S): at 06:30

## 2024-07-24 RX ADMIN — Medication 2 MILLIGRAM(S): at 16:24

## 2024-07-24 RX ADMIN — Medication 650 MILLIGRAM(S): at 12:01

## 2024-07-24 RX ADMIN — REMDESIVIR 200 MILLIGRAM(S): 100 INJECTION, POWDER, LYOPHILIZED, FOR SOLUTION INTRAVENOUS at 06:27

## 2024-07-24 RX ADMIN — DEXAMETHASONE 6 MILLIGRAM(S): 1.5 TABLET ORAL at 06:27

## 2024-07-24 RX ADMIN — Medication 10 MILLILITER(S): at 21:47

## 2024-07-24 RX ADMIN — Medication 10 MILLILITER(S): at 16:25

## 2024-07-24 RX ADMIN — REMDESIVIR 200 MILLIGRAM(S): 100 INJECTION, POWDER, LYOPHILIZED, FOR SOLUTION INTRAVENOUS at 22:21

## 2024-07-24 RX ADMIN — Medication 650 MILLIGRAM(S): at 12:12

## 2024-07-24 RX ADMIN — CHLORHEXIDINE GLUCONATE 1 APPLICATION(S): 500 CLOTH TOPICAL at 11:06

## 2024-07-24 RX ADMIN — Medication 650 MILLIGRAM(S): at 22:20

## 2024-07-24 RX ADMIN — TIOTROPIUM BROMIDE 2 PUFF(S): 18 CAPSULE ORAL; RESPIRATORY (INHALATION) at 07:50

## 2024-07-24 RX ADMIN — PANTOPRAZOLE SODIUM 40 MILLIGRAM(S): 20 TABLET, DELAYED RELEASE ORAL at 06:29

## 2024-07-24 RX ADMIN — ENOXAPARIN SODIUM 40 MILLIGRAM(S): 120 INJECTION SUBCUTANEOUS at 06:32

## 2024-07-24 RX ADMIN — Medication 5 MILLIGRAM(S): at 21:47

## 2024-07-24 NOTE — CONSULT NOTE ADULT - SUBJECTIVE AND OBJECTIVE BOX
BULMAROMARIA EUGENIA  68y, Female  Allergy: eggs (Short breath)  penicillin (Short breath)      CHIEF COMPLAINT:   copd exacerbation likely due to covid (2024 11:45)      LOS  1d    HPI  HPI:    Case of 68-year-old female with past medical history of  HFPEF, bipolar disorder, asthma/COPD, on as needed O2 at home, presents to the ED due to positive COVID test and shortness of breath.    Patient has been having some cough, congestion for the last 2 days, get COVID tested today that showed positive.    Per nurse patient did not look right and sent patient to ED.    Patient denies any active chest pain, abdominal pain, nausea vomiting, dysuria or hematuria. No fever or chills, no productive cough, no phlegm     In ED:  Vital signs: 120/65,  66,  98.6  WBC 3.8  Hb 13.2  Platelets 265  Crea 0.5  K 5.4 (hemolyzed)     CXR : No radiographic evidence of acute cardiopulmonary disease.  SO2 was 89% on RA, placed on 2L nc.   Given inhalers and Decadron tablet in ED.      (2024 17:19)      INFECTIOUS DISEASE HISTORY:  ID consulted for COVID requiring O2  Pt reports she was exposed to a friend on Thurs, developed symptoms on Friday  Cough, rhinorrhea    Currently ordered for:  remdesivir  IVPB   IV Intermittent   remdesivir  IVPB 100 milliGRAM(s) IV Intermittent every 24 hours      PMH  PAST MEDICAL & SURGICAL HISTORY:  COPD (chronic obstructive pulmonary disease)      Asthma      Lymphedema      Bipolar disorder      CHF (congestive heart failure)      History of hernia repair      History of D&C      H/O  section          FAMILY HISTORY  FH: leukemia (Sibling)        SOCIAL HISTORY  Social History:    denies IVDU    ROS  General: Denies rigors, nightsweats  HEENT: as noted above   CV: Denies CP, palpitations  PULM: as noted above   GI: Denies hematemesis, hematochezia, melena  : Denies discharge, hematuria  MSK: Denies arthralgias, myalgias  SKIN: Denies rash, lesions  NEURO: Denies paresthesias, weakness  PSYCH: Denies depression, anxiety     VITALS:  T(F): 98, Max: 98.2 (24 @ 16:01)  HR: 58  BP: 160/85  RR: 18Vital Signs Last 24 Hrs  T(C): 36.7 (2024 12:25), Max: 36.8 (2024 16:01)  T(F): 98 (2024 12:25), Max: 98.2 (2024 16:01)  HR: 58 (2024 12:25) (58 - 75)  BP: 160/85 (2024 12:25) (146/80 - 187/78)  BP(mean): --  RR: 18 (2024 12:25) (18 - 20)  SpO2: 93% (2024 21:47) (92% - 98%)    Parameters below as of 2024 21:47  Patient On (Oxygen Delivery Method): room air        PHYSICAL EXAM:  Gen: NAD, resting in bed NC  HEENT: Normocephalic, atraumatic  Neck: supple, no lymphadenopathy  CV: Regular rate & regular rhythm  Lungs: decreased BS at bases, no fremitus, no wheezing  Abdomen: Soft, BS present  Ext: Warm, well perfused  Neuro: non focal, awake  Skin: LE bilateral stasis dermatitis, edema  Lines: no phlebitis     TESTS & MEASUREMENTS:                        13.6   6.00  )-----------( 309      ( 2024 04:30 )             43.8     07-24    135  |  97<L>  |  10  ----------------------------<  82  5.3<H>   |  25  |  <0.5<L>    Ca    9.8      2024 04:30    TPro  7.0  /  Alb  4.2  /  TBili  <0.2  /  DBili  <0.2  /  AST  11  /  ALT  11  /  AlkPhos  113  07-24      LIVER FUNCTIONS - ( 2024 04:30 )  Alb: 4.2 g/dL / Pro: 7.0 g/dL / ALK PHOS: 113 U/L / ALT: 11 U/L / AST: 11 U/L / GGT: x           Urinalysis Basic - ( 2024 04:30 )    Color: x / Appearance: x / SG: x / pH: x  Gluc: 82 mg/dL / Ketone: x  / Bili: x / Urobili: x   Blood: x / Protein: x / Nitrite: x   Leuk Esterase: x / RBC: x / WBC x   Sq Epi: x / Non Sq Epi: x / Bacteria: x              INFECTIOUS DISEASES TESTING  Procalcitonin: <0.02 ng/mL (24 @ 07:00)  Rapid RVP Result: NotDetec (24 @ 20:54)      INFLAMMATORY MARKERS  C-Reactive Protein: 9.7 mg/L (24 @ 04:30)      RADIOLOGY & ADDITIONAL TESTS:  I have personally reviewed the last Chest xray  CXR      CT      CARDIOLOGY TESTING       MEDICATIONS  ARIPiprazole 5 Oral daily  bisacodyl 5 Oral at bedtime  budesonide 160 MICROgram(s)/formoterol 4.5 MICROgram(s) Inhaler 2 Inhalation two times a day  chlorhexidine 2% Cloths 1 Topical daily  dexAMETHasone  Injectable 6 IV Push daily  enoxaparin Injectable 40 SubCutaneous every 24 hours  guaifenesin/dextromethorphan Oral Liquid 10 Oral every 6 hours  lisinopril 10 Oral once  lisinopril 10 Oral daily  nicotine - 21 mG/24Hr(s) Patch 1 Transdermal daily  pantoprazole    Tablet 40 Oral before breakfast  remdesivir  IVPB  IV Intermittent   remdesivir  IVPB 100 IV Intermittent every 24 hours  tiotropium 2.5 MICROgram(s) Inhaler 2 Inhalation daily  trihexyphenidyl 2 Oral two times a day      ANTIBIOTICS:  remdesivir  IVPB   IV Intermittent   remdesivir  IVPB 100 milliGRAM(s) IV Intermittent every 24 hours      ALLERGIES:  eggs (Short breath)  penicillin (Short breath)

## 2024-07-24 NOTE — CONSULT NOTE ADULT - ASSESSMENT
ASSESSMENT  68-year-old female with past medical history of  HFPEF, bipolar disorder, asthma/COPD, on as needed O2 at home, presents to the ED due to positive COVID test and shortness of breath.    Patient has been having some cough, congestion for the last 2 days, get COVID tested today that showed positive.    Per nurse patient did not look right and sent patient to ED.      IMPRESSION  #COVID19, severe requiring supplemental O2     CXR no PNA  #Sepsis ruled out on admission   #COPD PRN O2  #HFPEF  #Obesity BMI (kg/m2): 30.9  #Immunodeficiency secondary to Senescence which could results in poor clinical outcomes  #Abx allergy: penicillin (Short breath)    Creatinine: <0.5 (07-24-24 @ 04:30)    Height (cm): 162.6 (07-23-24 @ 11:30)  Weight (kg): 81.6 (07-23-24 @ 11:30)    RECOMMENDATIONS  - RDV x 5 days  - Dex 6mg PO , course per primary team   - Monitor O2    If any questions, please send a message or call on [facility]  Please continue to update ID with any pertinent new laboratory, radiographic findings, or change in clinical status

## 2024-07-24 NOTE — PROGRESS NOTE ADULT - ASSESSMENT
Pt is a 68-year-old female w/ a PMH of HFPEF, bipolar disorder, asthma/COPD, 2L 02 at home, presented to the ED due to positive COVID test and shortness of breath.    Patient was having some cough, congestion since 7/21, got COVID tested 7/23 that was positive. Patient admitted to medicine floors for COPD exacerbation likely due to COVID.     #COPD exacerbation  #COVID positive PNA  -Patient uses 2L O2 at home as needed. No increase O2 requirements.   -c/w inhalers, Albuterol as needed. Standing LABA/ICS and LAMA  - continue remdesivir, and dexamethasone 6mg day 9/10  - O2 sat 93% 7/24  - CXR : No radiographic evidence of acute cardiopulmonary disease.  - monitor off antibiotics  - f/u d-dimer, CRP, ESR, ferritin, LDH    #Chronic venous stasis  -Duplex LE venous-> pending   - wound care: Cleanse bilateral lower legs with soap and water. Pat dry apply Dermaphor, leave open to air twice a day and prn for soiling.   - Recommend follow up with Vascular out patient per wound care    #H/x of HFpEF  -TTE 5/5/24 EF of 60%. Mild (grade 1) diastolic dysfunction. No regional wall motion abnormalities  -not in exacerbation     #HTN  -c/w home meds    #Bipolar disorder  -c/w Abilify / Artane     DVT prophylaxis: Lovenox 40 mg daily sq  PPT ppx: Pantoprazole 40 mg daily  Diet: DASH   Pending: respiratory status

## 2024-07-24 NOTE — ADVANCED PRACTICE NURSE CONSULT - RECOMMEDATIONS
Cleanse bilateral lower legs with soap and water.   Pat dry apply Dermaphor, leave open to air twice a day and prn for soiling.   Recommend follow up with Vascular out patient as per MDs discretion  Rest of care per primary team    Maintain pressure injury prevention.   Keep skin clean.   Maintain incontinence care.   Monitor skin for changes and notify provider

## 2024-07-24 NOTE — CONSULT NOTE ADULT - NS ATTEST RISK PROBLEM GEN_ALL_CORE FT
- I independently interpreted the most recent CXR- no PNA   - I discussed my recommendations with the primary team housestaff / Attending

## 2024-07-24 NOTE — ADVANCED PRACTICE NURSE CONSULT - ASSESSMENT
History of Present Illness:   Case of 68-year-old female with past medical history of  HFPEF, bipolar disorder, asthma/COPD, on as needed O2 at home, presents to the ED due to positive COVID test and shortness of breath. Patient has been having some cough, congestion for the last 2 days, get COVID tested today that showed positive.  Per nurse patient did not look right and sent patient to ED. Patient denies any active chest pain, abdominal pain, nausea vomiting, dysuria or hematuria. No fever or chills, no productive cough, no phlegm     Allergies and Intolerances:        Allergies:  	penicillin: Drug, Short breath  	eggs: Food, Short breath    Home Medications:   * Patient Currently Takes Medications as of 23-Jul-2024 18:22 documented in Structured Notes  · 	Dulcolax Laxative 5 mg oral tablet: Last Dose Taken:  , 4 tab(s) orally once a day  · 	GoLYTELY oral powder for reconstitution: Last Dose Taken:  , 1 orally once a day  · 	nicotine 21 mg/24 hr transdermal film, extended release: Last Dose Taken:  , 1 film(s) transdermal once a day  · 	Zestril 10 mg oral tablet: Last Dose Taken:  , 1 tab(s) orally once a day  · 	budesonide-formoterol 160 mcg-4.5 mcg/inh inhalation aerosol: Last Dose Taken:  , 2 puff(s) inhaled 2 times a day  · 	tiotropium 2.5 mcg/inh inhalation aerosol: Last Dose Taken:  , 2 puff(s) inhaled once a day  · 	pantoprazole 40 mg oral delayed release tablet: Last Dose Taken:  , 1 tab(s) orally every 12 hours  · 	Artane 2 mg oral tablet: Last Dose Taken:  , 2 tab(s) orally 2 times a day  · 	Abilify 5 mg oral tablet: Last Dose Taken:  , 1 tab(s) orally once a day (at bedtime)  · 	Albuterol (Eqv-ProAir HFA) 90 mcg/inh inhalation aerosol: Last Dose Taken:  , 2 puff(s) inhaled every 6 hours as needed for  bronchospasm    Patient received sitting up in bed. Alert and oriented. Limited mobility. High BMI. Consulted for lower extremities.     Xerosis and hyperpigmentation to bilateral lower legs with edema from toes to knees indicative of lymphedema. No skin breakdown.

## 2024-07-24 NOTE — PROGRESS NOTE ADULT - ASSESSMENT
Pt is a 68-year-old female w/ a PMH of HFPEF, bipolar disorder, asthma/COPD, 2L 02 at home, presented to the ED due to positive COVID test and shortness of breath.    Patient was having some cough, congestion since 7/21, got COVID tested 7/23 that was positive. Patient admitted to medicine floors for COPD exacerbation likely due to COVID.     #COPD exacerbation  #COVID positive  -Patient uses 2L O2 at home as needed. No increase O2 requirements.   -c/w inhalers, Albuterol as needed. Standing LABA/ICS and LAMA  -Prednisone 40 mg PO today 7/24-> will switch to IV 7/25  - O2 sat 93% 7/24  - CXR : No radiographic evidence of acute cardiopulmonary disease.  - monitor off antibiotics  - RVP -    - f/u d-dimer, CRP, ESR, ferritin, LDH    #Chronic venous stasis  -Duplex LE venous-> pending     #H/x of HFpEF  -TTE 5/5/24 EF of 60%. Mild (grade 1) diastolic dysfunction. No regional wall motion abnormalities  -not in exacerbation     #HTN  -c/w home meds    #Bipolar disorder  -c/w Abilify / Artane     DVT prophylaxis: Lovenox 40 mg daily sq  PPT ppx: Pantoprazole 40 mg daily  Diet: DASH      Pt is a 68-year-old female w/ a PMH of HFPEF, bipolar disorder, asthma/COPD, 2L 02 at home, presented to the ED due to positive COVID test and shortness of breath.    Patient was having some cough, congestion since 7/21, got COVID tested 7/23 that was positive. Patient admitted to medicine floors for COPD exacerbation likely due to COVID.     #COPD exacerbation  #COVID positive  -Patient uses 2L O2 at home as needed. No increase O2 requirements.   -c/w inhalers, Albuterol as needed. Standing LABA/ICS and LAMA  -Prednisone 40 mg PO today 7/24-> will switch to IV 7/25  - O2 sat 93% 7/24  - CXR : No radiographic evidence of acute cardiopulmonary disease.  - monitor off antibiotics  - RVP -    - f/u d-dimer, CRP, ESR, ferritin, LDH    #Chronic venous stasis  -Duplex LE venous-> pending   - wound care: Cleanse bilateral lower legs with soap and water. Pat dry apply Dermaphor, leave open to air twice a day and prn for soiling.   - Recommend follow up with Vascular out patient per wound care    #H/x of HFpEF  -TTE 5/5/24 EF of 60%. Mild (grade 1) diastolic dysfunction. No regional wall motion abnormalities  -not in exacerbation     #HTN  -c/w home meds    #Bipolar disorder  -c/w Abilify / Artane     DVT prophylaxis: Lovenox 40 mg daily sq  PPT ppx: Pantoprazole 40 mg daily  Diet: DASH

## 2024-07-25 LAB
ALBUMIN SERPL ELPH-MCNC: 4 G/DL — SIGNIFICANT CHANGE UP (ref 3.5–5.2)
ALBUMIN SERPL ELPH-MCNC: 4 G/DL — SIGNIFICANT CHANGE UP (ref 3.5–5.2)
ALP SERPL-CCNC: 102 U/L — SIGNIFICANT CHANGE UP (ref 30–115)
ALP SERPL-CCNC: 105 U/L — SIGNIFICANT CHANGE UP (ref 30–115)
ALT FLD-CCNC: 10 U/L — SIGNIFICANT CHANGE UP (ref 0–41)
ALT FLD-CCNC: 9 U/L — SIGNIFICANT CHANGE UP (ref 0–41)
ANION GAP SERPL CALC-SCNC: 10 MMOL/L — SIGNIFICANT CHANGE UP (ref 7–14)
AST SERPL-CCNC: 10 U/L — SIGNIFICANT CHANGE UP (ref 0–41)
AST SERPL-CCNC: 10 U/L — SIGNIFICANT CHANGE UP (ref 0–41)
BASOPHILS # BLD AUTO: 0.04 K/UL — SIGNIFICANT CHANGE UP (ref 0–0.2)
BASOPHILS NFR BLD AUTO: 0.4 % — SIGNIFICANT CHANGE UP (ref 0–1)
BILIRUB DIRECT SERPL-MCNC: <0.2 MG/DL — SIGNIFICANT CHANGE UP (ref 0–0.3)
BILIRUB INDIRECT FLD-MCNC: SIGNIFICANT CHANGE UP MG/DL (ref 0.2–1.2)
BILIRUB SERPL-MCNC: <0.2 MG/DL — SIGNIFICANT CHANGE UP (ref 0.2–1.2)
BILIRUB SERPL-MCNC: <0.2 MG/DL — SIGNIFICANT CHANGE UP (ref 0.2–1.2)
BUN SERPL-MCNC: 16 MG/DL — SIGNIFICANT CHANGE UP (ref 10–20)
CALCIUM SERPL-MCNC: 9.9 MG/DL — SIGNIFICANT CHANGE UP (ref 8.4–10.5)
CHLORIDE SERPL-SCNC: 97 MMOL/L — LOW (ref 98–110)
CO2 SERPL-SCNC: 28 MMOL/L — SIGNIFICANT CHANGE UP (ref 17–32)
CREAT SERPL-MCNC: 0.5 MG/DL — LOW (ref 0.7–1.5)
EGFR: 102 ML/MIN/1.73M2 — SIGNIFICANT CHANGE UP
EOSINOPHIL # BLD AUTO: 0.06 K/UL — SIGNIFICANT CHANGE UP (ref 0–0.7)
EOSINOPHIL NFR BLD AUTO: 0.7 % — SIGNIFICANT CHANGE UP (ref 0–8)
GLUCOSE SERPL-MCNC: 79 MG/DL — SIGNIFICANT CHANGE UP (ref 70–99)
HCT VFR BLD CALC: 42.5 % — SIGNIFICANT CHANGE UP (ref 37–47)
HGB BLD-MCNC: 13.3 G/DL — SIGNIFICANT CHANGE UP (ref 12–16)
IMM GRANULOCYTES NFR BLD AUTO: 0.4 % — HIGH (ref 0.1–0.3)
INR BLD: 0.95 RATIO — SIGNIFICANT CHANGE UP (ref 0.65–1.3)
LDH SERPL L TO P-CCNC: 189 — SIGNIFICANT CHANGE UP (ref 50–242)
LYMPHOCYTES # BLD AUTO: 2.54 K/UL — SIGNIFICANT CHANGE UP (ref 1.2–3.4)
LYMPHOCYTES # BLD AUTO: 28 % — SIGNIFICANT CHANGE UP (ref 20.5–51.1)
MCHC RBC-ENTMCNC: 24.4 PG — LOW (ref 27–31)
MCHC RBC-ENTMCNC: 31.3 G/DL — LOW (ref 32–37)
MCV RBC AUTO: 78 FL — LOW (ref 81–99)
MONOCYTES # BLD AUTO: 0.44 K/UL — SIGNIFICANT CHANGE UP (ref 0.1–0.6)
MONOCYTES NFR BLD AUTO: 4.9 % — SIGNIFICANT CHANGE UP (ref 1.7–9.3)
NEUTROPHILS # BLD AUTO: 5.95 K/UL — SIGNIFICANT CHANGE UP (ref 1.4–6.5)
NEUTROPHILS NFR BLD AUTO: 65.6 % — SIGNIFICANT CHANGE UP (ref 42.2–75.2)
NRBC # BLD: 0 /100 WBCS — SIGNIFICANT CHANGE UP (ref 0–0)
PHOSPHATE SERPL-MCNC: 3.9 MG/DL — SIGNIFICANT CHANGE UP (ref 2.1–4.9)
PLATELET # BLD AUTO: 287 K/UL — SIGNIFICANT CHANGE UP (ref 130–400)
PMV BLD: 9 FL — SIGNIFICANT CHANGE UP (ref 7.4–10.4)
POTASSIUM SERPL-MCNC: 5.1 MMOL/L — HIGH (ref 3.5–5)
POTASSIUM SERPL-SCNC: 5.1 MMOL/L — HIGH (ref 3.5–5)
PROCALCITONIN SERPL-MCNC: 0.02 NG/ML — SIGNIFICANT CHANGE UP (ref 0.02–0.1)
PROT SERPL-MCNC: 6 G/DL — SIGNIFICANT CHANGE UP (ref 6–8)
PROT SERPL-MCNC: 6.6 G/DL — SIGNIFICANT CHANGE UP (ref 6–8)
PROTHROM AB SERPL-ACNC: 10.8 SEC — SIGNIFICANT CHANGE UP (ref 9.95–12.87)
RBC # BLD: 5.45 M/UL — HIGH (ref 4.2–5.4)
RBC # FLD: 22.5 % — HIGH (ref 11.5–14.5)
SODIUM SERPL-SCNC: 135 MMOL/L — SIGNIFICANT CHANGE UP (ref 135–146)
WBC # BLD: 9.07 K/UL — SIGNIFICANT CHANGE UP (ref 4.8–10.8)
WBC # FLD AUTO: 9.07 K/UL — SIGNIFICANT CHANGE UP (ref 4.8–10.8)

## 2024-07-25 PROCEDURE — 99232 SBSQ HOSP IP/OBS MODERATE 35: CPT

## 2024-07-25 RX ORDER — SODIUM ZIRCONIUM CYCLOSILICATE 10 G/10G
5 POWDER, FOR SUSPENSION ORAL ONCE
Refills: 0 | Status: COMPLETED | OUTPATIENT
Start: 2024-07-25 | End: 2024-07-25

## 2024-07-25 RX ADMIN — SODIUM ZIRCONIUM CYCLOSILICATE 5 GRAM(S): 10 POWDER, FOR SUSPENSION ORAL at 10:45

## 2024-07-25 RX ADMIN — REMDESIVIR 200 MILLIGRAM(S): 100 INJECTION, POWDER, LYOPHILIZED, FOR SOLUTION INTRAVENOUS at 22:22

## 2024-07-25 RX ADMIN — Medication 10 MILLILITER(S): at 17:49

## 2024-07-25 RX ADMIN — DEXAMETHASONE 6 MILLIGRAM(S): 1.5 TABLET ORAL at 06:12

## 2024-07-25 RX ADMIN — CHLORHEXIDINE GLUCONATE 1 APPLICATION(S): 500 CLOTH TOPICAL at 11:43

## 2024-07-25 RX ADMIN — Medication 2 MILLIGRAM(S): at 17:49

## 2024-07-25 RX ADMIN — Medication 1 PATCH: at 07:53

## 2024-07-25 RX ADMIN — ENOXAPARIN SODIUM 40 MILLIGRAM(S): 120 INJECTION SUBCUTANEOUS at 06:13

## 2024-07-25 RX ADMIN — PANTOPRAZOLE SODIUM 40 MILLIGRAM(S): 20 TABLET, DELAYED RELEASE ORAL at 06:12

## 2024-07-25 RX ADMIN — Medication 10 MILLILITER(S): at 06:12

## 2024-07-25 RX ADMIN — Medication 5 MILLIGRAM(S): at 22:22

## 2024-07-25 RX ADMIN — LISINOPRIL 20 MILLIGRAM(S): 10 TABLET ORAL at 06:13

## 2024-07-25 RX ADMIN — Medication 1 PATCH: at 11:40

## 2024-07-25 RX ADMIN — Medication 2 MILLIGRAM(S): at 06:13

## 2024-07-25 RX ADMIN — TIOTROPIUM BROMIDE 2 PUFF(S): 18 CAPSULE ORAL; RESPIRATORY (INHALATION) at 07:37

## 2024-07-25 RX ADMIN — Medication 10 MILLILITER(S): at 11:40

## 2024-07-25 RX ADMIN — ARIPIPRAZOLE 5 MILLIGRAM(S): 15 TABLET ORAL at 11:40

## 2024-07-25 RX ADMIN — Medication 650 MILLIGRAM(S): at 04:43

## 2024-07-25 RX ADMIN — Medication 1 PATCH: at 10:46

## 2024-07-25 RX ADMIN — LISINOPRIL 10 MILLIGRAM(S): 10 TABLET ORAL at 06:12

## 2024-07-25 RX ADMIN — Medication 10 MILLILITER(S): at 23:54

## 2024-07-25 NOTE — PROGRESS NOTE ADULT - ASSESSMENT
Pt is a 68-year-old female w/ a PMH of HFPEF, bipolar disorder, asthma/COPD, 2L 02 at home, presented to the ED due to positive COVID test and shortness of breath.    Patient was having some cough, congestion since 7/21, got COVID tested 7/23 that was positive. Patient admitted to medicine floors for COPD exacerbation likely due to COVID.     #COPD exacerbation  #COVID positive PNA  -Patient uses 2L O2 at home as needed. No increase O2 requirements.   -c/w inhalers, Albuterol as needed. Standing LABA/ICS and LAMA  - continue remdesivir, and dexamethasone 6mg day 3/10  - still requiring 2L O2  - CXR : No radiographic evidence of acute cardiopulmonary disease.  - monitor off antibiotics  - minimal elevation in d-dimer, CRP, ESR, ferritin, LDH    #Chronic venous stasis  -Duplex LE venous-> pending   - wound care: Cleanse bilateral lower legs with soap and water. Pat dry apply Dermaphor, leave open to air twice a day and prn for soiling.   - Recommend follow up with Vascular out patient per wound care    #H/x of HFpEF  -TTE 5/5/24 EF of 60%. Mild (grade 1) diastolic dysfunction. No regional wall motion abnormalities  -not in exacerbation     #HTN  -c/w home meds    #Bipolar disorder  -c/w Abilify / Artane     DVT prophylaxis: Lovenox 40 mg daily sq  PPT ppx: Pantoprazole 40 mg daily  Diet: DASH   Pending: respiratory status

## 2024-07-25 NOTE — PROGRESS NOTE ADULT - ASSESSMENT
Pt is a 68-year-old female w/ a PMH of HFPEF, bipolar disorder, asthma/COPD, 2L 02 at home, presented to the ED due to positive COVID test and shortness of breath.    Patient was having some cough, congestion since 7/21, got COVID tested 7/23 that was positive. Patient admitted to medicine floors for COPD exacerbation likely due to COVID.     #COPD exacerbation  #COVID positive  -Patient uses 2L O2 at home PRN.  - Pt on 2L NC  -c/w inhalers, Albuterol as needed. Standing LABA/ICS and LAMA  -Prednisone 40 mg PO 7/24  - dexamethasone 6mg PO starting 7/25 for 8 days per ID  - Remdesivir for total of 5 days per ID- started 7/23  - O2 sat 93% 7/24 on 2L NC  - O2 sat 97% 7/25 on 2L NC- nurse took off NC and patient went down to 90%  - CXR : No radiographic evidence of acute cardiopulmonary disease.  - monitor off antibiotics  - RVP -    - d-dimer, CRP, ESR, ferritin- all within normal limits  - f/u LDH  - attempt to wean O2    #Chronic venous stasis  -Duplex LE venous-> negative  - wound care: Cleanse bilateral lower legs with soap and water. Pat dry apply Dermaphor, leave open to air twice a day and prn for soiling.   - Recommend follow up with Vascular out patient per wound care    #H/x of HFpEF  -TTE 5/5/24 EF of 60%. Mild (grade 1) diastolic dysfunction. No regional wall motion abnormalities  -not in exacerbation     #HTN  -lisinopril 20mg daily (increased from home dose of 10mg 7/25)  - monitor BP    #Bipolar disorder  -c/w Abilify / Artane     DVT prophylaxis: Lovenox 40 mg daily sq  PPT ppx: Pantoprazole 40 mg daily  Diet: DASH      Pt is a 68-year-old female w/ a PMH of HFPEF, bipolar disorder, asthma/COPD, 2L 02 at home, presented to the ED due to positive COVID test and shortness of breath.    Patient was having some cough, congestion since 7/21, got COVID tested 7/23 that was positive. Patient admitted to medicine floors for COPD exacerbation likely due to COVID.     #COPD exacerbation  #COVID positive  -Patient uses 2L O2 at home PRN.  - Pt on 2L NC  -c/w inhalers, Albuterol as needed. Standing LABA/ICS and LAMA  -Prednisone 40 mg PO 7/24  - dexamethasone 6mg PO starting 7/25 for 8 days per ID  - Remdesivir for total of 5 days per ID- started 7/23  - O2 sat 93% 7/24 on 2L NC  - O2 sat 97% 7/25 on 2L NC- nurse took off NC and patient went down to 90%  - CXR : No radiographic evidence of acute cardiopulmonary disease.  - monitor off antibiotics  - RVP -    - d-dimer, CRP, ESR, ferritin, LDH- all within normal limits  - attempt to wean O2    #Chronic venous stasis  -Duplex LE venous-> negative  - wound care: Cleanse bilateral lower legs with soap and water. Pat dry apply Dermaphor, leave open to air twice a day and prn for soiling.   - Recommend follow up with Vascular out patient per wound care    #H/x of HFpEF  -TTE 5/5/24 EF of 60%. Mild (grade 1) diastolic dysfunction. No regional wall motion abnormalities  -not in exacerbation     #HTN  - lisinopril 20mg daily started 7/25 (increased from home dose of 10mg)  - add HCTZ 25mg 7/25 to regimen   - monitor BP    #Bipolar disorder  -c/w Abilify / Artane     DVT prophylaxis: Lovenox 40 mg daily sq  PPT ppx: Pantoprazole 40 mg daily  Diet: DASH

## 2024-07-26 LAB
ALBUMIN SERPL ELPH-MCNC: 4 G/DL — SIGNIFICANT CHANGE UP (ref 3.5–5.2)
ALP SERPL-CCNC: 110 U/L — SIGNIFICANT CHANGE UP (ref 30–115)
ALT FLD-CCNC: 10 U/L — SIGNIFICANT CHANGE UP (ref 0–41)
AST SERPL-CCNC: 11 U/L — SIGNIFICANT CHANGE UP (ref 0–41)
BILIRUB DIRECT SERPL-MCNC: <0.2 MG/DL — SIGNIFICANT CHANGE UP (ref 0–0.3)
BILIRUB INDIRECT FLD-MCNC: SIGNIFICANT CHANGE UP MG/DL (ref 0.2–1.2)
BILIRUB SERPL-MCNC: <0.2 MG/DL — SIGNIFICANT CHANGE UP (ref 0.2–1.2)
CREAT SERPL-MCNC: 0.6 MG/DL — LOW (ref 0.7–1.5)
EGFR: 98 ML/MIN/1.73M2 — SIGNIFICANT CHANGE UP
INR BLD: 0.95 RATIO — SIGNIFICANT CHANGE UP (ref 0.65–1.3)
PROT SERPL-MCNC: 6.5 G/DL — SIGNIFICANT CHANGE UP (ref 6–8)
PROTHROM AB SERPL-ACNC: 10.8 SEC — SIGNIFICANT CHANGE UP (ref 9.95–12.87)

## 2024-07-26 PROCEDURE — 99232 SBSQ HOSP IP/OBS MODERATE 35: CPT

## 2024-07-26 PROCEDURE — 71045 X-RAY EXAM CHEST 1 VIEW: CPT | Mod: 26

## 2024-07-26 RX ORDER — PETROLATUM 42 G/100G
1 OINTMENT TOPICAL AT BEDTIME
Refills: 0 | Status: DISCONTINUED | OUTPATIENT
Start: 2024-07-26 | End: 2024-07-30

## 2024-07-26 RX ORDER — ARIPIPRAZOLE 15 MG/1
5 TABLET ORAL AT BEDTIME
Refills: 0 | Status: DISCONTINUED | OUTPATIENT
Start: 2024-07-26 | End: 2024-07-30

## 2024-07-26 RX ORDER — SIMETHICONE 125 MG/1
80 TABLET, CHEWABLE ORAL ONCE
Refills: 0 | Status: COMPLETED | OUTPATIENT
Start: 2024-07-26 | End: 2024-07-26

## 2024-07-26 RX ADMIN — SIMETHICONE 80 MILLIGRAM(S): 125 TABLET, CHEWABLE ORAL at 16:26

## 2024-07-26 RX ADMIN — Medication 650 MILLIGRAM(S): at 16:09

## 2024-07-26 RX ADMIN — Medication 1 PATCH: at 09:45

## 2024-07-26 RX ADMIN — CHLORHEXIDINE GLUCONATE 1 APPLICATION(S): 500 CLOTH TOPICAL at 12:08

## 2024-07-26 RX ADMIN — Medication 1 PATCH: at 12:09

## 2024-07-26 RX ADMIN — TIOTROPIUM BROMIDE 2 PUFF(S): 18 CAPSULE ORAL; RESPIRATORY (INHALATION) at 07:38

## 2024-07-26 RX ADMIN — ARIPIPRAZOLE 5 MILLIGRAM(S): 15 TABLET ORAL at 21:14

## 2024-07-26 RX ADMIN — Medication 2 MILLIGRAM(S): at 12:09

## 2024-07-26 RX ADMIN — Medication 10 MILLILITER(S): at 23:36

## 2024-07-26 RX ADMIN — Medication 10 MILLILITER(S): at 12:09

## 2024-07-26 RX ADMIN — BUDESONIDE AND FORMOTEROL FUMARATE DIHYDRATE 2 PUFF(S): 80; 4.5 AEROSOL RESPIRATORY (INHALATION) at 21:16

## 2024-07-26 RX ADMIN — Medication 10 MILLILITER(S): at 06:32

## 2024-07-26 RX ADMIN — REMDESIVIR 200 MILLIGRAM(S): 100 INJECTION, POWDER, LYOPHILIZED, FOR SOLUTION INTRAVENOUS at 23:30

## 2024-07-26 RX ADMIN — LISINOPRIL 10 MILLIGRAM(S): 10 TABLET ORAL at 06:33

## 2024-07-26 RX ADMIN — Medication 1 PATCH: at 11:30

## 2024-07-26 RX ADMIN — Medication 650 MILLIGRAM(S): at 15:39

## 2024-07-26 RX ADMIN — Medication 2 MILLIGRAM(S): at 17:51

## 2024-07-26 RX ADMIN — DEXAMETHASONE 6 MILLIGRAM(S): 1.5 TABLET ORAL at 06:33

## 2024-07-26 RX ADMIN — BUDESONIDE AND FORMOTEROL FUMARATE DIHYDRATE 2 PUFF(S): 80; 4.5 AEROSOL RESPIRATORY (INHALATION) at 12:09

## 2024-07-26 RX ADMIN — Medication 10 MILLILITER(S): at 17:52

## 2024-07-26 RX ADMIN — ENOXAPARIN SODIUM 40 MILLIGRAM(S): 120 INJECTION SUBCUTANEOUS at 06:40

## 2024-07-26 RX ADMIN — Medication 5 MILLIGRAM(S): at 21:15

## 2024-07-26 RX ADMIN — Medication 650 MILLIGRAM(S): at 01:51

## 2024-07-26 RX ADMIN — PANTOPRAZOLE SODIUM 40 MILLIGRAM(S): 20 TABLET, DELAYED RELEASE ORAL at 06:33

## 2024-07-26 RX ADMIN — Medication 1 PATCH: at 18:38

## 2024-07-26 RX ADMIN — LISINOPRIL 20 MILLIGRAM(S): 10 TABLET ORAL at 06:33

## 2024-07-26 NOTE — PHYSICAL THERAPY INITIAL EVALUATION ADULT - GENERAL OBSERVATIONS, REHAB EVAL
Patient was seen from 8:55-9:29 for PT IE. Patient was rec'd in semi reclined in bed, +IVL, +4 L/min O2 via NC, NAD, agreeable to participate in PT.

## 2024-07-26 NOTE — PROGRESS NOTE ADULT - ASSESSMENT
Pt is a 68-year-old female w/ a PMH of HFPEF, bipolar disorder, asthma/COPD, 2L 02 at home, presented to the ED due to positive COVID test and shortness of breath.    Patient was having some cough, congestion since 7/21, got COVID tested 7/23 that was positive. Patient admitted to medicine floors for COPD exacerbation likely due to COVID.     #COPD exacerbation  #COVID positive PNA  -Patient uses 2L O2 at home as needed. No increase O2 requirements.   -c/w inhalers, Albuterol as needed. Standing LABA/ICS and LAMA  - continue remdesivir-day 4/5, and dexamethasone 6mg day 4/10  - still requiring 2L O2  - CXR : No radiographic evidence of acute cardiopulmonary disease.  - monitor off antibiotics  - minimal elevation in d-dimer, CRP, ESR, ferritin, LDH    #Chronic venous stasis  -Duplex LE venous-> pending   - wound care: Cleanse bilateral lower legs with soap and water. Pat dry apply Dermaphor, leave open to air twice a day and prn for soiling.   - Recommend follow up with Vascular out patient per wound care    #H/x of HFpEF  -TTE 5/5/24 EF of 60%. Mild (grade 1) diastolic dysfunction. No regional wall motion abnormalities  -not in exacerbation     #HTN  -c/w home meds    #Bipolar disorder  -c/w Abilify / Artane     DVT prophylaxis: Lovenox 40 mg daily sq  PPT ppx: Pantoprazole 40 mg daily  Diet: DASH   Pending: respiratory status   Dispo: [facility] - [facility]ike is pending

## 2024-07-26 NOTE — PHYSICAL THERAPY INITIAL EVALUATION ADULT - PERTINENT HX OF CURRENT PROBLEM, REHAB EVAL
68-year-old female with past medical history of  HFPEF, bipolar disorder, asthma/COPD, on as needed O2 at home, presents to the ED due to positive COVID test and shortness of breath.    Patient has been having some cough, congestion for the last 2 days, get COVID tested today that showed positive.    Per nurse patient did not look right and sent patient to ED.    Patient denies any active chest pain, abdominal pain, nausea vomiting, dysuria or hematuria. No fever or chills, no productive cough, no phlegm

## 2024-07-27 LAB
ALBUMIN SERPL ELPH-MCNC: 4.3 G/DL — SIGNIFICANT CHANGE UP (ref 3.5–5.2)
ALBUMIN SERPL ELPH-MCNC: 4.4 G/DL — SIGNIFICANT CHANGE UP (ref 3.5–5.2)
ALP SERPL-CCNC: 101 U/L — SIGNIFICANT CHANGE UP (ref 30–115)
ALP SERPL-CCNC: 102 U/L — SIGNIFICANT CHANGE UP (ref 30–115)
ALT FLD-CCNC: 15 U/L — SIGNIFICANT CHANGE UP (ref 0–41)
ALT FLD-CCNC: 16 U/L — SIGNIFICANT CHANGE UP (ref 0–41)
ANION GAP SERPL CALC-SCNC: 11 MMOL/L — SIGNIFICANT CHANGE UP (ref 7–14)
AST SERPL-CCNC: 14 U/L — SIGNIFICANT CHANGE UP (ref 0–41)
AST SERPL-CCNC: 14 U/L — SIGNIFICANT CHANGE UP (ref 0–41)
BASOPHILS # BLD AUTO: 0.05 K/UL — SIGNIFICANT CHANGE UP (ref 0–0.2)
BASOPHILS NFR BLD AUTO: 0.4 % — SIGNIFICANT CHANGE UP (ref 0–1)
BILIRUB DIRECT SERPL-MCNC: <0.2 MG/DL — SIGNIFICANT CHANGE UP (ref 0–0.3)
BILIRUB INDIRECT FLD-MCNC: SIGNIFICANT CHANGE UP MG/DL (ref 0.2–1.2)
BILIRUB SERPL-MCNC: 0.2 MG/DL — SIGNIFICANT CHANGE UP (ref 0.2–1.2)
BILIRUB SERPL-MCNC: <0.2 MG/DL — SIGNIFICANT CHANGE UP (ref 0.2–1.2)
BUN SERPL-MCNC: 20 MG/DL — SIGNIFICANT CHANGE UP (ref 10–20)
CALCIUM SERPL-MCNC: 10.2 MG/DL — SIGNIFICANT CHANGE UP (ref 8.4–10.5)
CHLORIDE SERPL-SCNC: 91 MMOL/L — LOW (ref 98–110)
CO2 SERPL-SCNC: 31 MMOL/L — SIGNIFICANT CHANGE UP (ref 17–32)
CREAT SERPL-MCNC: 0.5 MG/DL — LOW (ref 0.7–1.5)
EGFR: 102 ML/MIN/1.73M2 — SIGNIFICANT CHANGE UP
EOSINOPHIL # BLD AUTO: 0.02 K/UL — SIGNIFICANT CHANGE UP (ref 0–0.7)
EOSINOPHIL NFR BLD AUTO: 0.2 % — SIGNIFICANT CHANGE UP (ref 0–8)
GLUCOSE SERPL-MCNC: 79 MG/DL — SIGNIFICANT CHANGE UP (ref 70–99)
HCT VFR BLD CALC: 45.1 % — SIGNIFICANT CHANGE UP (ref 37–47)
HGB BLD-MCNC: 14.1 G/DL — SIGNIFICANT CHANGE UP (ref 12–16)
IMM GRANULOCYTES NFR BLD AUTO: 0.6 % — HIGH (ref 0.1–0.3)
INR BLD: 0.95 RATIO — SIGNIFICANT CHANGE UP (ref 0.65–1.3)
LYMPHOCYTES # BLD AUTO: 1.53 K/UL — SIGNIFICANT CHANGE UP (ref 1.2–3.4)
LYMPHOCYTES # BLD AUTO: 13.4 % — LOW (ref 20.5–51.1)
MAGNESIUM SERPL-MCNC: 2.2 MG/DL — SIGNIFICANT CHANGE UP (ref 1.8–2.4)
MCHC RBC-ENTMCNC: 24.3 PG — LOW (ref 27–31)
MCHC RBC-ENTMCNC: 31.3 G/DL — LOW (ref 32–37)
MCV RBC AUTO: 77.6 FL — LOW (ref 81–99)
MONOCYTES # BLD AUTO: 0.51 K/UL — SIGNIFICANT CHANGE UP (ref 0.1–0.6)
MONOCYTES NFR BLD AUTO: 4.5 % — SIGNIFICANT CHANGE UP (ref 1.7–9.3)
NEUTROPHILS # BLD AUTO: 9.28 K/UL — HIGH (ref 1.4–6.5)
NEUTROPHILS NFR BLD AUTO: 80.9 % — HIGH (ref 42.2–75.2)
NRBC # BLD: 0 /100 WBCS — SIGNIFICANT CHANGE UP (ref 0–0)
PHOSPHATE SERPL-MCNC: 4.9 MG/DL — SIGNIFICANT CHANGE UP (ref 2.1–4.9)
PLATELET # BLD AUTO: 320 K/UL — SIGNIFICANT CHANGE UP (ref 130–400)
PMV BLD: 9.3 FL — SIGNIFICANT CHANGE UP (ref 7.4–10.4)
POTASSIUM SERPL-MCNC: 4.9 MMOL/L — SIGNIFICANT CHANGE UP (ref 3.5–5)
POTASSIUM SERPL-SCNC: 4.9 MMOL/L — SIGNIFICANT CHANGE UP (ref 3.5–5)
PROT SERPL-MCNC: 6.9 G/DL — SIGNIFICANT CHANGE UP (ref 6–8)
PROT SERPL-MCNC: 7 G/DL — SIGNIFICANT CHANGE UP (ref 6–8)
PROTHROM AB SERPL-ACNC: 10.8 SEC — SIGNIFICANT CHANGE UP (ref 9.95–12.87)
RBC # BLD: 5.81 M/UL — HIGH (ref 4.2–5.4)
RBC # FLD: 22.3 % — HIGH (ref 11.5–14.5)
SODIUM SERPL-SCNC: 133 MMOL/L — LOW (ref 135–146)
WBC # BLD: 11.46 K/UL — HIGH (ref 4.8–10.8)
WBC # FLD AUTO: 11.46 K/UL — HIGH (ref 4.8–10.8)

## 2024-07-27 PROCEDURE — 99231 SBSQ HOSP IP/OBS SF/LOW 25: CPT

## 2024-07-27 RX ADMIN — Medication 2 MILLIGRAM(S): at 18:48

## 2024-07-27 RX ADMIN — Medication 10 MILLILITER(S): at 05:16

## 2024-07-27 RX ADMIN — Medication 1 PATCH: at 12:09

## 2024-07-27 RX ADMIN — PANTOPRAZOLE SODIUM 40 MILLIGRAM(S): 20 TABLET, DELAYED RELEASE ORAL at 05:17

## 2024-07-27 RX ADMIN — Medication 10 MILLILITER(S): at 18:48

## 2024-07-27 RX ADMIN — Medication 1 PATCH: at 13:36

## 2024-07-27 RX ADMIN — CHLORHEXIDINE GLUCONATE 1 APPLICATION(S): 500 CLOTH TOPICAL at 13:16

## 2024-07-27 RX ADMIN — DEXAMETHASONE 6 MILLIGRAM(S): 1.5 TABLET ORAL at 05:17

## 2024-07-27 RX ADMIN — Medication 10 MILLILITER(S): at 13:36

## 2024-07-27 RX ADMIN — ENOXAPARIN SODIUM 40 MILLIGRAM(S): 120 INJECTION SUBCUTANEOUS at 05:45

## 2024-07-27 RX ADMIN — ARIPIPRAZOLE 5 MILLIGRAM(S): 15 TABLET ORAL at 21:24

## 2024-07-27 RX ADMIN — LISINOPRIL 10 MILLIGRAM(S): 10 TABLET ORAL at 05:17

## 2024-07-27 RX ADMIN — Medication 5 MILLIGRAM(S): at 21:24

## 2024-07-27 RX ADMIN — TIOTROPIUM BROMIDE 2 PUFF(S): 18 CAPSULE ORAL; RESPIRATORY (INHALATION) at 07:46

## 2024-07-27 RX ADMIN — Medication 650 MILLIGRAM(S): at 09:58

## 2024-07-27 RX ADMIN — Medication 2 PUFF(S): at 21:25

## 2024-07-27 RX ADMIN — Medication 1 PATCH: at 19:41

## 2024-07-27 RX ADMIN — Medication 2 MILLIGRAM(S): at 05:17

## 2024-07-27 RX ADMIN — LISINOPRIL 20 MILLIGRAM(S): 10 TABLET ORAL at 05:17

## 2024-07-27 NOTE — PROGRESS NOTE ADULT - ASSESSMENT
Pt is a 68-year-old female w/ a PMH of HFPEF, bipolar disorder, asthma/COPD, 2L 02 at home, presented to the ED due to positive COVID test and shortness of breath.    Patient was having some cough, congestion since 7/21, got COVID tested 7/23 that was positive. Patient admitted to medicine floors for COPD exacerbation likely due to COVID.     #COPD exacerbation  #COVID positive PNA  -Patient uses 2L O2 at home as needed. No increase O2 requirements.   -c/w inhalers, Albuterol as needed. Standing LABA/ICS and LAMA  - continue remdesivir-day 5/5, and dexamethasone 6mg day 5/10  - still requiring 2L O2  - CXR : No radiographic evidence of acute cardiopulmonary disease.  - monitor off antibiotics  - minimal elevation in d-dimer, CRP, ESR, ferritin, LDH    #Chronic venous stasis  -Duplex LE venous-> pending   - wound care: Cleanse bilateral lower legs with soap and water. Pat dry apply Dermaphor, leave open to air twice a day and prn for soiling.   - Recommend follow up with Vascular out patient per wound care    #H/x of HFpEF  -TTE 5/5/24 EF of 60%. Mild (grade 1) diastolic dysfunction. No regional wall motion abnormalities  -not in exacerbation     #HTN  -c/w home meds    #Bipolar disorder  -c/w Abilify / Artane     DVT prophylaxis: Lovenox 40 mg daily sq  PPT ppx: Pantoprazole 40 mg daily  Diet: DASH   Pending: medically stable for dc   Dispo: [facility] - auth is pending

## 2024-07-28 LAB
ALBUMIN SERPL ELPH-MCNC: 3.9 G/DL — SIGNIFICANT CHANGE UP (ref 3.5–5.2)
ALBUMIN SERPL ELPH-MCNC: 4 G/DL — SIGNIFICANT CHANGE UP (ref 3.5–5.2)
ALP SERPL-CCNC: 107 U/L — SIGNIFICANT CHANGE UP (ref 30–115)
ALP SERPL-CCNC: 109 U/L — SIGNIFICANT CHANGE UP (ref 30–115)
ALT FLD-CCNC: 16 U/L — SIGNIFICANT CHANGE UP (ref 0–41)
ALT FLD-CCNC: 16 U/L — SIGNIFICANT CHANGE UP (ref 0–41)
ANION GAP SERPL CALC-SCNC: 16 MMOL/L — HIGH (ref 7–14)
AST SERPL-CCNC: 13 U/L — SIGNIFICANT CHANGE UP (ref 0–41)
AST SERPL-CCNC: 16 U/L — SIGNIFICANT CHANGE UP (ref 0–41)
BASOPHILS # BLD AUTO: 0.06 K/UL — SIGNIFICANT CHANGE UP (ref 0–0.2)
BASOPHILS NFR BLD AUTO: 0.6 % — SIGNIFICANT CHANGE UP (ref 0–1)
BILIRUB DIRECT SERPL-MCNC: <0.2 MG/DL — SIGNIFICANT CHANGE UP (ref 0–0.3)
BILIRUB INDIRECT FLD-MCNC: SIGNIFICANT CHANGE UP MG/DL (ref 0.2–1.2)
BILIRUB SERPL-MCNC: 0.3 MG/DL — SIGNIFICANT CHANGE UP (ref 0.2–1.2)
BILIRUB SERPL-MCNC: <0.2 MG/DL — SIGNIFICANT CHANGE UP (ref 0.2–1.2)
BUN SERPL-MCNC: 24 MG/DL — HIGH (ref 10–20)
CALCIUM SERPL-MCNC: 9.5 MG/DL — SIGNIFICANT CHANGE UP (ref 8.4–10.5)
CHLORIDE SERPL-SCNC: 90 MMOL/L — LOW (ref 98–110)
CO2 SERPL-SCNC: 24 MMOL/L — SIGNIFICANT CHANGE UP (ref 17–32)
CREAT SERPL-MCNC: 0.8 MG/DL — SIGNIFICANT CHANGE UP (ref 0.7–1.5)
EGFR: 80 ML/MIN/1.73M2 — SIGNIFICANT CHANGE UP
EOSINOPHIL # BLD AUTO: 0.04 K/UL — SIGNIFICANT CHANGE UP (ref 0–0.7)
EOSINOPHIL NFR BLD AUTO: 0.4 % — SIGNIFICANT CHANGE UP (ref 0–8)
GLUCOSE SERPL-MCNC: 198 MG/DL — HIGH (ref 70–99)
HCT VFR BLD CALC: 43.8 % — SIGNIFICANT CHANGE UP (ref 37–47)
HGB BLD-MCNC: 13.8 G/DL — SIGNIFICANT CHANGE UP (ref 12–16)
IMM GRANULOCYTES NFR BLD AUTO: 0.9 % — HIGH (ref 0.1–0.3)
INR BLD: 0.95 RATIO — SIGNIFICANT CHANGE UP (ref 0.65–1.3)
LYMPHOCYTES # BLD AUTO: 1.63 K/UL — SIGNIFICANT CHANGE UP (ref 1.2–3.4)
LYMPHOCYTES # BLD AUTO: 16.9 % — LOW (ref 20.5–51.1)
MAGNESIUM SERPL-MCNC: 1.7 MG/DL — LOW (ref 1.8–2.4)
MCHC RBC-ENTMCNC: 24.3 PG — LOW (ref 27–31)
MCHC RBC-ENTMCNC: 31.5 G/DL — LOW (ref 32–37)
MCV RBC AUTO: 77.2 FL — LOW (ref 81–99)
MONOCYTES # BLD AUTO: 0.31 K/UL — SIGNIFICANT CHANGE UP (ref 0.1–0.6)
MONOCYTES NFR BLD AUTO: 3.2 % — SIGNIFICANT CHANGE UP (ref 1.7–9.3)
NEUTROPHILS # BLD AUTO: 7.53 K/UL — HIGH (ref 1.4–6.5)
NEUTROPHILS NFR BLD AUTO: 78 % — HIGH (ref 42.2–75.2)
NRBC # BLD: 0 /100 WBCS — SIGNIFICANT CHANGE UP (ref 0–0)
PHOSPHATE SERPL-MCNC: 3.5 MG/DL — SIGNIFICANT CHANGE UP (ref 2.1–4.9)
PLATELET # BLD AUTO: 282 K/UL — SIGNIFICANT CHANGE UP (ref 130–400)
PMV BLD: 9.2 FL — SIGNIFICANT CHANGE UP (ref 7.4–10.4)
POTASSIUM SERPL-MCNC: 4.5 MMOL/L — SIGNIFICANT CHANGE UP (ref 3.5–5)
POTASSIUM SERPL-SCNC: 4.5 MMOL/L — SIGNIFICANT CHANGE UP (ref 3.5–5)
PROT SERPL-MCNC: 6.3 G/DL — SIGNIFICANT CHANGE UP (ref 6–8)
PROT SERPL-MCNC: 6.5 G/DL — SIGNIFICANT CHANGE UP (ref 6–8)
PROTHROM AB SERPL-ACNC: 10.8 SEC — SIGNIFICANT CHANGE UP (ref 9.95–12.87)
RBC # BLD: 5.67 M/UL — HIGH (ref 4.2–5.4)
RBC # FLD: 21.8 % — HIGH (ref 11.5–14.5)
SODIUM SERPL-SCNC: 130 MMOL/L — LOW (ref 135–146)
WBC # BLD: 9.66 K/UL — SIGNIFICANT CHANGE UP (ref 4.8–10.8)
WBC # FLD AUTO: 9.66 K/UL — SIGNIFICANT CHANGE UP (ref 4.8–10.8)

## 2024-07-28 PROCEDURE — 99231 SBSQ HOSP IP/OBS SF/LOW 25: CPT

## 2024-07-28 RX ORDER — MAGNESIUM SULFATE 500 MG/ML
2 VIAL (ML) INJECTION ONCE
Refills: 0 | Status: COMPLETED | OUTPATIENT
Start: 2024-07-28 | End: 2024-07-28

## 2024-07-28 RX ADMIN — ENOXAPARIN SODIUM 40 MILLIGRAM(S): 120 INJECTION SUBCUTANEOUS at 07:06

## 2024-07-28 RX ADMIN — Medication 10 MILLILITER(S): at 18:26

## 2024-07-28 RX ADMIN — Medication 5 MILLIGRAM(S): at 21:08

## 2024-07-28 RX ADMIN — Medication 1 PATCH: at 19:56

## 2024-07-28 RX ADMIN — Medication 2 MILLIGRAM(S): at 07:07

## 2024-07-28 RX ADMIN — Medication 10 MILLILITER(S): at 23:54

## 2024-07-28 RX ADMIN — REMDESIVIR 200 MILLIGRAM(S): 100 INJECTION, POWDER, LYOPHILIZED, FOR SOLUTION INTRAVENOUS at 00:06

## 2024-07-28 RX ADMIN — Medication 2 MILLIGRAM(S): at 18:26

## 2024-07-28 RX ADMIN — BUDESONIDE AND FORMOTEROL FUMARATE DIHYDRATE 2 PUFF(S): 80; 4.5 AEROSOL RESPIRATORY (INHALATION) at 08:59

## 2024-07-28 RX ADMIN — Medication 1 PATCH: at 07:35

## 2024-07-28 RX ADMIN — DEXAMETHASONE 6 MILLIGRAM(S): 1.5 TABLET ORAL at 07:08

## 2024-07-28 RX ADMIN — BUDESONIDE AND FORMOTEROL FUMARATE DIHYDRATE 2 PUFF(S): 80; 4.5 AEROSOL RESPIRATORY (INHALATION) at 21:42

## 2024-07-28 RX ADMIN — Medication 10 MILLILITER(S): at 12:46

## 2024-07-28 RX ADMIN — Medication 650 MILLIGRAM(S): at 23:53

## 2024-07-28 RX ADMIN — Medication 650 MILLIGRAM(S): at 01:04

## 2024-07-28 RX ADMIN — LISINOPRIL 20 MILLIGRAM(S): 10 TABLET ORAL at 08:12

## 2024-07-28 RX ADMIN — Medication 10 MILLILITER(S): at 00:06

## 2024-07-28 RX ADMIN — LISINOPRIL 10 MILLIGRAM(S): 10 TABLET ORAL at 08:10

## 2024-07-28 RX ADMIN — Medication 1 PATCH: at 18:03

## 2024-07-28 RX ADMIN — Medication 650 MILLIGRAM(S): at 01:31

## 2024-07-28 RX ADMIN — Medication 1 PATCH: at 12:46

## 2024-07-28 RX ADMIN — PANTOPRAZOLE SODIUM 40 MILLIGRAM(S): 20 TABLET, DELAYED RELEASE ORAL at 07:11

## 2024-07-28 RX ADMIN — Medication 25 GRAM(S): at 07:40

## 2024-07-28 RX ADMIN — ARIPIPRAZOLE 5 MILLIGRAM(S): 15 TABLET ORAL at 21:06

## 2024-07-28 RX ADMIN — TIOTROPIUM BROMIDE 2 PUFF(S): 18 CAPSULE ORAL; RESPIRATORY (INHALATION) at 07:42

## 2024-07-28 RX ADMIN — Medication 10 MILLILITER(S): at 07:11

## 2024-07-28 NOTE — PROGRESS NOTE ADULT - ASSESSMENT
Pt is a 68-year-old female w/ a PMH of HFPEF, bipolar disorder, asthma/COPD, 2L 02 at home, presented to the ED due to positive COVID test and shortness of breath.    Patient was having some cough, congestion since 7/21, got COVID tested 7/23 that was positive. Patient admitted to medicine floors for COPD exacerbation likely due to COVID.     #COPD exacerbation  #COVID positive PNA  -Patient uses 2L O2 at home as needed. No increase O2 requirements.   -c/w inhalers, Albuterol as needed. Standing LABA/ICS and LAMA  - completed remdesivir-day 5/5, and dexamethasone 6mg day 6/10  - still requiring 2L O2 intermittently   - CXR : No radiographic evidence of acute cardiopulmonary disease.  - monitor off antibiotics  - minimal elevation in d-dimer, CRP, ESR, ferritin, LDH    #Chronic venous stasis  -Duplex LE venous-> pending   - wound care: Cleanse bilateral lower legs with soap and water. Pat dry apply Dermaphor, leave open to air twice a day and prn for soiling.   - Recommend follow up with Vascular out patient per wound care    #H/x of HFpEF  -TTE 5/5/24 EF of 60%. Mild (grade 1) diastolic dysfunction. No regional wall motion abnormalities  -not in exacerbation     #HTN  -c/w home meds    #Bipolar disorder  -c/w Abilify / Artane     DVT prophylaxis: Lovenox 40 mg daily sq  PPT ppx: Pantoprazole 40 mg daily  Diet: DASH   Pending: medically stable for dc   Dispo: [facility] is pending

## 2024-07-29 ENCOUNTER — TRANSCRIPTION ENCOUNTER (OUTPATIENT)
Age: 69
End: 2024-07-29

## 2024-07-29 LAB
ALBUMIN SERPL ELPH-MCNC: 4.1 G/DL — SIGNIFICANT CHANGE UP (ref 3.5–5.2)
ALP SERPL-CCNC: 90 U/L — SIGNIFICANT CHANGE UP (ref 30–115)
ALT FLD-CCNC: 14 U/L — SIGNIFICANT CHANGE UP (ref 0–41)
AST SERPL-CCNC: 15 U/L — SIGNIFICANT CHANGE UP (ref 0–41)
BILIRUB DIRECT SERPL-MCNC: <0.2 MG/DL — SIGNIFICANT CHANGE UP (ref 0–0.3)
BILIRUB INDIRECT FLD-MCNC: >0 MG/DL — LOW (ref 0.2–1.2)
BILIRUB SERPL-MCNC: 0.2 MG/DL — SIGNIFICANT CHANGE UP (ref 0.2–1.2)
CREAT SERPL-MCNC: 0.5 MG/DL — LOW (ref 0.7–1.5)
EGFR: 102 ML/MIN/1.73M2 — SIGNIFICANT CHANGE UP
PROT SERPL-MCNC: 6.5 G/DL — SIGNIFICANT CHANGE UP (ref 6–8)

## 2024-07-29 PROCEDURE — 99231 SBSQ HOSP IP/OBS SF/LOW 25: CPT

## 2024-07-29 RX ADMIN — ENOXAPARIN SODIUM 40 MILLIGRAM(S): 120 INJECTION SUBCUTANEOUS at 05:14

## 2024-07-29 RX ADMIN — LISINOPRIL 10 MILLIGRAM(S): 10 TABLET ORAL at 05:15

## 2024-07-29 RX ADMIN — Medication 2 MILLIGRAM(S): at 05:14

## 2024-07-29 RX ADMIN — BUDESONIDE AND FORMOTEROL FUMARATE DIHYDRATE 2 PUFF(S): 80; 4.5 AEROSOL RESPIRATORY (INHALATION) at 10:18

## 2024-07-29 RX ADMIN — Medication 10 MILLILITER(S): at 12:35

## 2024-07-29 RX ADMIN — Medication 650 MILLIGRAM(S): at 22:07

## 2024-07-29 RX ADMIN — DEXAMETHASONE 6 MILLIGRAM(S): 1.5 TABLET ORAL at 05:15

## 2024-07-29 RX ADMIN — CHLORHEXIDINE GLUCONATE 1 APPLICATION(S): 500 CLOTH TOPICAL at 12:37

## 2024-07-29 RX ADMIN — LISINOPRIL 20 MILLIGRAM(S): 10 TABLET ORAL at 05:15

## 2024-07-29 RX ADMIN — Medication 1 PATCH: at 12:00

## 2024-07-29 RX ADMIN — Medication 1 PATCH: at 12:35

## 2024-07-29 RX ADMIN — ARIPIPRAZOLE 5 MILLIGRAM(S): 15 TABLET ORAL at 22:06

## 2024-07-29 RX ADMIN — Medication 10 MILLILITER(S): at 05:14

## 2024-07-29 RX ADMIN — TIOTROPIUM BROMIDE 2 PUFF(S): 18 CAPSULE ORAL; RESPIRATORY (INHALATION) at 08:02

## 2024-07-29 RX ADMIN — Medication 650 MILLIGRAM(S): at 00:53

## 2024-07-29 RX ADMIN — Medication 2 MILLIGRAM(S): at 17:18

## 2024-07-29 RX ADMIN — Medication 10 MILLILITER(S): at 17:18

## 2024-07-29 RX ADMIN — Medication 5 MILLIGRAM(S): at 22:06

## 2024-07-29 RX ADMIN — PANTOPRAZOLE SODIUM 40 MILLIGRAM(S): 20 TABLET, DELAYED RELEASE ORAL at 05:14

## 2024-07-29 NOTE — PROGRESS NOTE ADULT - REASON FOR ADMISSION
Covid +ve
Covid positive, COPD exacerbation
Covid +ve
copd exacerbation likely due to covid

## 2024-07-29 NOTE — PROGRESS NOTE ADULT - SUBJECTIVE AND OBJECTIVE BOX
24H events:    Patient is a 68y old Female who presents with a chief complaint of Covid +ve (2024 13:45)    Primary diagnosis of Shortness of breath          Today is hospital day 7d.   This morning patient was seen and examined at bedside, resting comfortably in bed.    No acute or major events overnight. Shortness of breath and cough improving, denies any new complaints. Satting appropriately on 2L nc and on RA.      PAST MEDICAL & SURGICAL HISTORY  COPD (chronic obstructive pulmonary disease)    Asthma    Lymphedema    Bipolar disorder    CHF (congestive heart failure)    History of hernia repair    History of D&C    H/O  section      ALLERGIES:  eggs (Short breath)  penicillin (Short breath)    MEDICATIONS:  STANDING MEDICATIONS  ARIPiprazole 5 milliGRAM(s) Oral at bedtime  bisacodyl 5 milliGRAM(s) Oral at bedtime  budesonide 160 MICROgram(s)/formoterol 4.5 MICROgram(s) Inhaler 2 Puff(s) Inhalation two times a day  chlorhexidine 2% Cloths 1 Application(s) Topical daily  dexAMETHasone     Tablet 6 milliGRAM(s) Oral daily  enoxaparin Injectable 40 milliGRAM(s) SubCutaneous every 24 hours  guaifenesin/dextromethorphan Oral Liquid 10 milliLiter(s) Oral every 6 hours  hydrochlorothiazide 25 milliGRAM(s) Oral daily  lisinopril 20 milliGRAM(s) Oral daily  lisinopril 10 milliGRAM(s) Oral daily  nicotine - 21 mG/24Hr(s) Patch 1 Patch Transdermal daily  pantoprazole    Tablet 40 milliGRAM(s) Oral before breakfast  tiotropium 2.5 MICROgram(s) Inhaler 2 Puff(s) Inhalation daily  trihexyphenidyl 2 milliGRAM(s) Oral two times a day    PRN MEDICATIONS  acetaminophen     Tablet .. 650 milliGRAM(s) Oral every 6 hours PRN  albuterol    90 MICROgram(s) HFA Inhaler 2 Puff(s) Inhalation every 6 hours PRN  AQUAPHOR (petrolatum Ointment) 1 Application(s) Topical at bedtime PRN  benzonatate 100 milliGRAM(s) Oral three times a day PRN    VITALS:   T(F): 97.8  HR: 71  BP: 105/66  RR: 18  SpO2: 95%    PHYSICAL EXAM:  GENERAL: NAD, lying in bed comfortably  HEAD:  Atraumatic, Normocephalic  EYES: EOMI, PERRLA, conjunctiva and sclera clear  ENT: Moist mucous membranes  NECK: Supple, No JVD  CHEST/LUNG: Clear to auscultation bilaterally; No rales, rhonchi, wheezing, or rubs. Unlabored respirations  HEART: Regular rate and rhythm; No murmurs, rubs, or gallops  ABDOMEN: BSx4; Soft, nontender, nondistended  EXTREMITIES:  2+ Peripheral Pulses, brisk capillary refill. No clubbing, cyanosis, or edema  NERVOUS SYSTEM:  A&Ox3, no focal deficits   SKIN: No rashes or lesions      LABS:                        13.8   9.66  )-----------( 282      ( 2024 04:30 )             43.8     07-    x   |  x   |  x   ----------------------------<  x   x    |  x   |  0.5<L>    Ca    9.5      2024 04:30  Phos  3.5       Mg     1.7         TPro  6.5  /  Alb  4.1  /  TBili  0.2  /  DBili  <0.2  /  AST  15  /  ALT  14  /  AlkPhos  90  -    PT/INR - ( 2024 04:30 )   PT: 10.80 sec;   INR: 0.95 ratio           Urinalysis Basic - ( 2024 04:30 )    Color: x / Appearance: x / SG: x / pH: x  Gluc: 198 mg/dL / Ketone: x  / Bili: x / Urobili: x   Blood: x / Protein: x / Nitrite: x   Leuk Esterase: x / RBC: x / WBC x   Sq Epi: x / Non Sq Epi: x / Bacteria: x            Assessment and Plan:   · Assessment	  Pt is a 68-year-old female w/ a PMH of HFPEF, bipolar disorder, asthma/COPD, 2L 02 at home, presented to the ED due to positive COVID test and shortness of breath.    Patient was having some cough, congestion since , got COVID tested  that was positive. Patient admitted to medicine floors for COPD exacerbation likely due to COVID.     #COPD exacerbation  #COVID positive PNA  -Patient uses 2L O2 at home as needed. No increase O2 requirements.   -c/w inhalers, Albuterol as needed. Standing LABA/ICS and LAMA  - completed remdesivir-day , and dexamethasone 6mg day 6/10  - still requiring 2L O2 intermittently   - CXR : No radiographic evidence of acute cardiopulmonary disease.  - monitor off antibiotics  - minimal elevation in d-dimer, CRP, ESR, ferritin, LDH    #Chronic venous stasis  -Duplex LE venous-> pending   - wound care: Cleanse bilateral lower legs with soap and water. Pat dry apply Dermaphor, leave open to air twice a day and prn for soiling.   - Recommend follow up with Vascular out patient per wound care    #H/x of HFpEF  -TTE 24 EF of 60%. Mild (grade 1) diastolic dysfunction. No regional wall motion abnormalities  -not in exacerbation     #HTN  -c/w home meds    #Bipolar disorder  -c/w Abilify / Artane     DVT prophylaxis: Lovenox 40 mg daily sq  PPT ppx: Pantoprazole 40 mg daily  Diet: DASH   Pending: medically stable for dc   Dispo: [facility] is pending   
Patient is a 68y old  Female who presents with a chief complaint of Covid +ve (24 Jul 2024 13:50)      Patient seen and examined at bedside.  Patient reports some shortness of breath, no chest pain   ALLERGIES:  eggs (Short breath)  penicillin (Short breath)    MEDICATIONS:  acetaminophen     Tablet .. 650 milliGRAM(s) Oral every 6 hours PRN  albuterol    90 MICROgram(s) HFA Inhaler 2 Puff(s) Inhalation every 6 hours PRN  ARIPiprazole 5 milliGRAM(s) Oral daily  benzonatate 100 milliGRAM(s) Oral three times a day PRN  bisacodyl 5 milliGRAM(s) Oral at bedtime  budesonide 160 MICROgram(s)/formoterol 4.5 MICROgram(s) Inhaler 2 Puff(s) Inhalation two times a day  chlorhexidine 2% Cloths 1 Application(s) Topical daily  enoxaparin Injectable 40 milliGRAM(s) SubCutaneous every 24 hours  guaifenesin/dextromethorphan Oral Liquid 10 milliLiter(s) Oral every 6 hours  lisinopril 10 milliGRAM(s) Oral daily  nicotine - 21 mG/24Hr(s) Patch 1 Patch Transdermal daily  pantoprazole    Tablet 40 milliGRAM(s) Oral before breakfast  remdesivir  IVPB   IV Intermittent   remdesivir  IVPB 100 milliGRAM(s) IV Intermittent every 24 hours  tiotropium 2.5 MICROgram(s) Inhaler 2 Puff(s) Inhalation daily  trihexyphenidyl 2 milliGRAM(s) Oral two times a day    Vital Signs Last 24 Hrs  T(F): 98 (24 Jul 2024 12:25), Max: 98 (24 Jul 2024 12:25)  HR: 58 (24 Jul 2024 12:25) (58 - 72)  BP: 160/85 (24 Jul 2024 12:25) (146/80 - 187/78)  RR: 18 (24 Jul 2024 12:25) (18 - 20)  SpO2: 93% (23 Jul 2024 21:47) (93% - 93%)  I&O's Summary      PHYSICAL EXAM:  General: NAD, A/O x 3  ENT: MMM  Neck: Supple, No JVD  Lungs: mild expiratory wheeze, cough  Cardio: RRR, S1/S2, No murmurs  Abdomen: Soft, Nontender, Nondistended; Bowel sounds present  Extremities: No cyanosis, +3-4 edema with venous stasis changes     LABS:                        13.6   6.00  )-----------( 309      ( 24 Jul 2024 04:30 )             43.8     07-24    135  |  97  |  10  ----------------------------<  82  5.3   |  25  |  <0.5    Ca    9.8      24 Jul 2024 04:30  Mg     2.8     07-24    TPro  7.0  /  Alb  4.2  /  TBili  <0.2  /  DBili  <0.2  /  AST  11  /  ALT  11  /  AlkPhos  113  07-24      PT/INR - ( 24 Jul 2024 04:30 )   PT: 10.50 sec;   INR: 0.92 ratio                                   Urinalysis Basic - ( 24 Jul 2024 04:30 )    Color: x / Appearance: x / SG: x / pH: x  Gluc: 82 mg/dL / Ketone: x  / Bili: x / Urobili: x   Blood: x / Protein: x / Nitrite: x   Leuk Esterase: x / RBC: x / WBC x   Sq Epi: x / Non Sq Epi: x / Bacteria: x            RADIOLOGY & ADDITIONAL TESTS:    Care Discussed with Consultants/Other Providers: 
Patient is a 68y old  Female who presents with a chief complaint of Covid +ve (25 Jul 2024 10:40)      Patient seen and examined at bedside.  Patient reports some shortness of breath   ALLERGIES:  eggs (Short breath)  penicillin (Short breath)    MEDICATIONS:  acetaminophen     Tablet .. 650 milliGRAM(s) Oral every 6 hours PRN  albuterol    90 MICROgram(s) HFA Inhaler 2 Puff(s) Inhalation every 6 hours PRN  ARIPiprazole 5 milliGRAM(s) Oral daily  benzonatate 100 milliGRAM(s) Oral three times a day PRN  bisacodyl 5 milliGRAM(s) Oral at bedtime  budesonide 160 MICROgram(s)/formoterol 4.5 MICROgram(s) Inhaler 2 Puff(s) Inhalation two times a day  chlorhexidine 2% Cloths 1 Application(s) Topical daily  dexAMETHasone     Tablet 6 milliGRAM(s) Oral daily  enoxaparin Injectable 40 milliGRAM(s) SubCutaneous every 24 hours  guaifenesin/dextromethorphan Oral Liquid 10 milliLiter(s) Oral every 6 hours  hydrochlorothiazide 25 milliGRAM(s) Oral daily  lisinopril 10 milliGRAM(s) Oral daily  lisinopril 20 milliGRAM(s) Oral daily  nicotine - 21 mG/24Hr(s) Patch 1 Patch Transdermal daily  pantoprazole    Tablet 40 milliGRAM(s) Oral before breakfast  remdesivir  IVPB   IV Intermittent   remdesivir  IVPB 100 milliGRAM(s) IV Intermittent every 24 hours  tiotropium 2.5 MICROgram(s) Inhaler 2 Puff(s) Inhalation daily  trihexyphenidyl 2 milliGRAM(s) Oral two times a day    Vital Signs Last 24 Hrs  T(F): 97.5 (25 Jul 2024 12:46), Max: 97.8 (24 Jul 2024 19:47)  HR: 59 (25 Jul 2024 12:46) (53 - 78)  BP: 170/77 (25 Jul 2024 12:46) (148/66 - 177/83)  RR: 18 (25 Jul 2024 12:46) (18 - 18)  SpO2: 98% (25 Jul 2024 12:46) (90% - 98%)  I&O's Summary      PHYSICAL EXAM:  General: NAD, A/O x 3  ENT: MMM  Neck: Supple, No JVD  Lungs: diminished fine expiratory wheezing   Cardio: RRR, S1/S2, No murmurs  Abdomen: Soft, Nontender, Nondistended; Bowel sounds present  Extremities: No cyanosis, No edema    LABS:                        13.3   9.07  )-----------( 287      ( 25 Jul 2024 08:11 )             42.5     07-25    135  |  97  |  16  ----------------------------<  79  5.1   |  28  |  0.5    Ca    9.9      25 Jul 2024 08:11  Phos  3.9     07-25  Mg     2.8     07-24    TPro  6.6  /  Alb  4.0  /  TBili  <0.2  /  DBili  <0.2  /  AST  10  /  ALT  9   /  AlkPhos  102  07-25      PT/INR - ( 25 Jul 2024 08:11 )   PT: 10.80 sec;   INR: 0.95 ratio                                   Urinalysis Basic - ( 25 Jul 2024 08:11 )    Color: x / Appearance: x / SG: x / pH: x  Gluc: 79 mg/dL / Ketone: x  / Bili: x / Urobili: x   Blood: x / Protein: x / Nitrite: x   Leuk Esterase: x / RBC: x / WBC x   Sq Epi: x / Non Sq Epi: x / Bacteria: x            RADIOLOGY & ADDITIONAL TESTS:    Care Discussed with Consultants/Other Providers: 
Patient is a 68y old  Female who presents with a chief complaint of Covid +ve (26 Jul 2024 17:09)      Patient seen and examined at bedside.  Patient denies any chest pain or shortness of breath   ALLERGIES:  eggs (Short breath)  penicillin (Short breath)    MEDICATIONS:  acetaminophen     Tablet .. 650 milliGRAM(s) Oral every 6 hours PRN  albuterol    90 MICROgram(s) HFA Inhaler 2 Puff(s) Inhalation every 6 hours PRN  AQUAPHOR (petrolatum Ointment) 1 Application(s) Topical at bedtime PRN  ARIPiprazole 5 milliGRAM(s) Oral at bedtime  benzonatate 100 milliGRAM(s) Oral three times a day PRN  bisacodyl 5 milliGRAM(s) Oral at bedtime  budesonide 160 MICROgram(s)/formoterol 4.5 MICROgram(s) Inhaler 2 Puff(s) Inhalation two times a day  chlorhexidine 2% Cloths 1 Application(s) Topical daily  dexAMETHasone     Tablet 6 milliGRAM(s) Oral daily  enoxaparin Injectable 40 milliGRAM(s) SubCutaneous every 24 hours  guaifenesin/dextromethorphan Oral Liquid 10 milliLiter(s) Oral every 6 hours  hydrochlorothiazide 25 milliGRAM(s) Oral daily  lisinopril 20 milliGRAM(s) Oral daily  lisinopril 10 milliGRAM(s) Oral daily  nicotine - 21 mG/24Hr(s) Patch 1 Patch Transdermal daily  pantoprazole    Tablet 40 milliGRAM(s) Oral before breakfast  remdesivir  IVPB   IV Intermittent   remdesivir  IVPB 100 milliGRAM(s) IV Intermittent every 24 hours  tiotropium 2.5 MICROgram(s) Inhaler 2 Puff(s) Inhalation daily  trihexyphenidyl 2 milliGRAM(s) Oral two times a day    Vital Signs Last 24 Hrs  T(F): 97.4 (27 Jul 2024 12:03), Max: 98.4 (27 Jul 2024 04:00)  HR: 58 (27 Jul 2024 12:03) (54 - 88)  BP: 125/72 (27 Jul 2024 12:03) (121/69 - 125/72)  RR: 18 (27 Jul 2024 12:03) (18 - 18)  SpO2: 96% (27 Jul 2024 12:03) (96% - 100%)  I&O's Summary      PHYSICAL EXAM:  General: NAD, A/O x 3  ENT: MMM  Neck: Supple, No JVD  Lungs: diminished breath sounds   Cardio: RRR, S1/S2, No murmurs  Abdomen: Soft, Nontender, Nondistended; Bowel sounds present  Extremities: No cyanosis, +3 edema, chronic changes     LABS:                        14.1   11.46 )-----------( 320      ( 27 Jul 2024 08:15 )             45.1     07-27    133  |  91  |  20  ----------------------------<  79  4.9   |  31  |  0.5    Ca    10.2      27 Jul 2024 08:15  Phos  4.9     07-27  Mg     2.2     07-27    TPro  7.0  /  Alb  4.4  /  TBili  0.2  /  DBili  <0.2  /  AST  14  /  ALT  16  /  AlkPhos  102  07-27      PT/INR - ( 27 Jul 2024 08:15 )   PT: 10.80 sec;   INR: 0.95 ratio                                   Urinalysis Basic - ( 27 Jul 2024 08:15 )    Color: x / Appearance: x / SG: x / pH: x  Gluc: 79 mg/dL / Ketone: x  / Bili: x / Urobili: x   Blood: x / Protein: x / Nitrite: x   Leuk Esterase: x / RBC: x / WBC x   Sq Epi: x / Non Sq Epi: x / Bacteria: x            RADIOLOGY & ADDITIONAL TESTS:    Care Discussed with Consultants/Other Providers: 
Patient is a 68y old  Female who presents with a chief complaint of Covid +ve (27 Jul 2024 13:36)      Patient seen and examined at bedside.  Patient denies any chest pain or shortness of breath   ALLERGIES:  eggs (Short breath)  penicillin (Short breath)    MEDICATIONS:  acetaminophen     Tablet .. 650 milliGRAM(s) Oral every 6 hours PRN  albuterol    90 MICROgram(s) HFA Inhaler 2 Puff(s) Inhalation every 6 hours PRN  AQUAPHOR (petrolatum Ointment) 1 Application(s) Topical at bedtime PRN  ARIPiprazole 5 milliGRAM(s) Oral at bedtime  benzonatate 100 milliGRAM(s) Oral three times a day PRN  bisacodyl 5 milliGRAM(s) Oral at bedtime  budesonide 160 MICROgram(s)/formoterol 4.5 MICROgram(s) Inhaler 2 Puff(s) Inhalation two times a day  chlorhexidine 2% Cloths 1 Application(s) Topical daily  dexAMETHasone     Tablet 6 milliGRAM(s) Oral daily  enoxaparin Injectable 40 milliGRAM(s) SubCutaneous every 24 hours  guaifenesin/dextromethorphan Oral Liquid 10 milliLiter(s) Oral every 6 hours  hydrochlorothiazide 25 milliGRAM(s) Oral daily  lisinopril 20 milliGRAM(s) Oral daily  lisinopril 10 milliGRAM(s) Oral daily  magnesium sulfate  IVPB 2 Gram(s) IV Intermittent once  nicotine - 21 mG/24Hr(s) Patch 1 Patch Transdermal daily  pantoprazole    Tablet 40 milliGRAM(s) Oral before breakfast  tiotropium 2.5 MICROgram(s) Inhaler 2 Puff(s) Inhalation daily  trihexyphenidyl 2 milliGRAM(s) Oral two times a day    Vital Signs Last 24 Hrs  T(F): 97.5 (28 Jul 2024 06:23), Max: 97.5 (28 Jul 2024 06:23)  HR: 56 (28 Jul 2024 06:23) (56 - 65)  BP: 110/70 (28 Jul 2024 06:23) (110/70 - 135/79)  RR: 18 (28 Jul 2024 06:23) (16 - 18)  SpO2: 98% (28 Jul 2024 06:23) (93% - 98%)  I&O's Summary      PHYSICAL EXAM:  General: NAD, A/O x 3  ENT: MMM  Neck: Supple, No JVD  Lungs: diminished breath sounds, no wheezing   Cardio: RRR, S1/S2, 2/6 systolic murmur   Abdomen: Soft, Nontender, Nondistended; Bowel sounds present  Extremities: No cyanosis, +2 LE edema    LABS:                        13.8   9.66  )-----------( 282      ( 28 Jul 2024 04:30 )             43.8     07-28    130  |  90  |  24  ----------------------------<  198  4.5   |  24  |  0.8    Ca    9.5      28 Jul 2024 04:30  Phos  3.5     07-28  Mg     1.7     07-28    TPro  6.5  /  Alb  4.0  /  TBili  0.3  /  DBili  <0.2  /  AST  13  /  ALT  16  /  AlkPhos  109  07-28      PT/INR - ( 28 Jul 2024 04:30 )   PT: 10.80 sec;   INR: 0.95 ratio                                   Urinalysis Basic - ( 28 Jul 2024 04:30 )    Color: x / Appearance: x / SG: x / pH: x  Gluc: 198 mg/dL / Ketone: x  / Bili: x / Urobili: x   Blood: x / Protein: x / Nitrite: x   Leuk Esterase: x / RBC: x / WBC x   Sq Epi: x / Non Sq Epi: x / Bacteria: x            RADIOLOGY & ADDITIONAL TESTS:    Care Discussed with Consultants/Other Providers: 
Patient is a 68y old  Female who presents with a chief complaint of Covid positive, COPD exacerbation (29 Jul 2024 16:24)      Patient seen and examined at bedside.  Patient denies any chest pain or shortness of breath   ALLERGIES:  eggs (Short breath)  penicillin (Short breath)    MEDICATIONS:  acetaminophen     Tablet .. 650 milliGRAM(s) Oral every 6 hours PRN  albuterol    90 MICROgram(s) HFA Inhaler 2 Puff(s) Inhalation every 6 hours PRN  AQUAPHOR (petrolatum Ointment) 1 Application(s) Topical at bedtime PRN  ARIPiprazole 5 milliGRAM(s) Oral at bedtime  benzonatate 100 milliGRAM(s) Oral three times a day PRN  bisacodyl 5 milliGRAM(s) Oral at bedtime  budesonide 160 MICROgram(s)/formoterol 4.5 MICROgram(s) Inhaler 2 Puff(s) Inhalation two times a day  chlorhexidine 2% Cloths 1 Application(s) Topical daily  dexAMETHasone     Tablet 6 milliGRAM(s) Oral daily  enoxaparin Injectable 40 milliGRAM(s) SubCutaneous every 24 hours  guaifenesin/dextromethorphan Oral Liquid 10 milliLiter(s) Oral every 6 hours  hydrochlorothiazide 25 milliGRAM(s) Oral daily  lisinopril 20 milliGRAM(s) Oral daily  lisinopril 10 milliGRAM(s) Oral daily  nicotine - 21 mG/24Hr(s) Patch 1 Patch Transdermal daily  pantoprazole    Tablet 40 milliGRAM(s) Oral before breakfast  tiotropium 2.5 MICROgram(s) Inhaler 2 Puff(s) Inhalation daily  trihexyphenidyl 2 milliGRAM(s) Oral two times a day    Vital Signs Last 24 Hrs  T(F): 98.3 (29 Jul 2024 17:30), Max: 98.3 (29 Jul 2024 17:30)  HR: 82 (29 Jul 2024 17:30) (61 - 82)  BP: 126/84 (29 Jul 2024 17:30) (105/66 - 163/75)  RR: 18 (29 Jul 2024 17:30) (18 - 18)  SpO2: 95% (29 Jul 2024 17:30) (95% - 97%)  I&O's Summary      PHYSICAL EXAM:  General: NAD, A/O x 3  ENT: MMM  Neck: Supple, No JVD  Lungs: diminshed breath sounds, no wheezing   Cardio: RRR, S1/S2, No murmurs  Abdomen: Soft, Nontender, Nondistended; Bowel sounds present  Extremities: No cyanosis, +3 LE edema    LABS:                        13.8   9.66  )-----------( 282      ( 28 Jul 2024 04:30 )             43.8     07-29    x   |  x   |  x   ----------------------------<  x   x    |  x   |  0.5    Ca    9.5      28 Jul 2024 04:30  Phos  3.5     07-28  Mg     1.7     07-28    TPro  6.5  /  Alb  4.1  /  TBili  0.2  /  DBili  <0.2  /  AST  15  /  ALT  14  /  AlkPhos  90  07-29      PT/INR - ( 28 Jul 2024 04:30 )   PT: 10.80 sec;   INR: 0.95 ratio                                   Urinalysis Basic - ( 28 Jul 2024 04:30 )    Color: x / Appearance: x / SG: x / pH: x  Gluc: 198 mg/dL / Ketone: x  / Bili: x / Urobili: x   Blood: x / Protein: x / Nitrite: x   Leuk Esterase: x / RBC: x / WBC x   Sq Epi: x / Non Sq Epi: x / Bacteria: x            RADIOLOGY & ADDITIONAL TESTS:    Care Discussed with Consultants/Other Providers: 
SUBJECTIVE/OVERNIGHT EVENTS  Today is hospital day 2d. This morning patient was seen and examined at bedside, resting comfortably in bed. No acute or major events overnight.    HOSPITAL COURSE    Pt is a 68-year-old female w/ a PMH of HFPEF, bipolar disorder, asthma/COPD, 2L 02 at home, presented to the ED due to positive COVID test and shortness of breath.    Patient was having some cough, congestion since 7/21, got COVID tested 7/23 that was positive. Patient admitted to medicine floors for COPD exacerbation likely due to COVID.     CODE STATUS:    FAMILY COMMUNICATION  Contact date:  Name of person contacted:  Relationship to patient:  Communication details:    MEDICATIONS  STANDING MEDICATIONS  ARIPiprazole 5 milliGRAM(s) Oral daily  bisacodyl 5 milliGRAM(s) Oral at bedtime  budesonide 160 MICROgram(s)/formoterol 4.5 MICROgram(s) Inhaler 2 Puff(s) Inhalation two times a day  chlorhexidine 2% Cloths 1 Application(s) Topical daily  dexAMETHasone     Tablet 6 milliGRAM(s) Oral daily  enoxaparin Injectable 40 milliGRAM(s) SubCutaneous every 24 hours  guaifenesin/dextromethorphan Oral Liquid 10 milliLiter(s) Oral every 6 hours  lisinopril 20 milliGRAM(s) Oral daily  lisinopril 10 milliGRAM(s) Oral daily  nicotine - 21 mG/24Hr(s) Patch 1 Patch Transdermal daily  pantoprazole    Tablet 40 milliGRAM(s) Oral before breakfast  remdesivir  IVPB   IV Intermittent   remdesivir  IVPB 100 milliGRAM(s) IV Intermittent every 24 hours  sodium zirconium cyclosilicate 5 Gram(s) Oral once  tiotropium 2.5 MICROgram(s) Inhaler 2 Puff(s) Inhalation daily  trihexyphenidyl 2 milliGRAM(s) Oral two times a day    PRN MEDICATIONS  acetaminophen     Tablet .. 650 milliGRAM(s) Oral every 6 hours PRN  albuterol    90 MICROgram(s) HFA Inhaler 2 Puff(s) Inhalation every 6 hours PRN  benzonatate 100 milliGRAM(s) Oral three times a day PRN    VITALS  T(F): 96.8 (07-25-24 @ 04:29), Max: 98 (07-24-24 @ 12:25)  HR: 69 (07-25-24 @ 09:49) (53 - 78)  BP: 166/74 (07-25-24 @ 09:49) (148/66 - 177/83)  RR: 18 (07-25-24 @ 04:29) (18 - 18)  SpO2: 90% (07-25-24 @ 09:49) (90% - 97%)    PHYSICAL EXAM  GENERAL  (x  ) NAD, lying in bed comfortably     (  ) obtunded     (  ) lethargic     (  ) somnolent    HEAD  (  ) Atraumatic     (  ) hematoma     (  ) laceration (specify location:       )     NECK  (  ) Supple     (  ) neck stiffness     (  ) nuchal rigidity     (  )  no JVD     (  ) JVD present ( -- cm)    HEART  Rate -->  ( x ) normal rate    (  ) bradycardic    (  ) tachycardic  Rhythm -->  ( x regular    (  ) regularly irregular    (  ) irregularly irregular  Murmurs -->  (  ) normal s1/s2    (  ) systolic murmur    (  ) diastolic murmur    (  ) continuous murmur     (  ) S3 present    (  ) S4 present    LUNGS  ( x )Unlabored respirations     (  ) tachypnea  (  ) B/L air entry     (  ) decreased breath sounds in:  (location     )    (  ) no adventitious sound     (  ) crackles     (  ) wheezing      (  ) rhonchi      (specify location:       )  (  ) chest wall tenderness (specify location:       )  - mild wheeze in R lung  - on 2L NC    ABDOMEN  -distended and tense    EXTREMITIES  - rough scaly in b/l lower LE         LABS             13.3   9.07  )-----------( 287      ( 07-25-24 @ 08:11 )             42.5     135  |  97  |  16  -------------------------<  79   07-25-24 @ 08:11  5.1  |  28  |  0.5    Ca      9.9     07-25-24 @ 08:11  Phos   3.9     07-25-24 @ 08:11  Mg     2.8     07-24-24 @ 15:45    TPro  6.6  /  Alb  4.0  /  TBili  <0.2  /  DBili  <0.2  /  AST  10  /  ALT  9   /  AlkPhos  102  /  GGT  x     07-25-24 @ 08:11    PT/INR - ( 07-25-24 @ 08:11 )   PT: 10.80 sec;   INR: 0.95 ratio      Urinalysis Basic - ( 25 Jul 2024 08:11 )    Color: x / Appearance: x / SG: x / pH: x  Gluc: 79 mg/dL / Ketone: x  / Bili: x / Urobili: x   Blood: x / Protein: x / Nitrite: x   Leuk Esterase: x / RBC: x / WBC x   Sq Epi: x / Non Sq Epi: x / Bacteria: x          IMAGING
Patient is a 68y old  Female who presents with a chief complaint of Covid +ve (26 Jul 2024 08:52)      Patient seen and examined at bedside.  Patient denies any shortness of breath or chest pain   ALLERGIES:  eggs (Short breath)  penicillin (Short breath)    MEDICATIONS:  acetaminophen     Tablet .. 650 milliGRAM(s) Oral every 6 hours PRN  albuterol    90 MICROgram(s) HFA Inhaler 2 Puff(s) Inhalation every 6 hours PRN  AQUAPHOR (petrolatum Ointment) 1 Application(s) Topical at bedtime PRN  ARIPiprazole 5 milliGRAM(s) Oral at bedtime  benzonatate 100 milliGRAM(s) Oral three times a day PRN  bisacodyl 5 milliGRAM(s) Oral at bedtime  budesonide 160 MICROgram(s)/formoterol 4.5 MICROgram(s) Inhaler 2 Puff(s) Inhalation two times a day  chlorhexidine 2% Cloths 1 Application(s) Topical daily  dexAMETHasone     Tablet 6 milliGRAM(s) Oral daily  enoxaparin Injectable 40 milliGRAM(s) SubCutaneous every 24 hours  guaifenesin/dextromethorphan Oral Liquid 10 milliLiter(s) Oral every 6 hours  hydrochlorothiazide 25 milliGRAM(s) Oral daily  lisinopril 10 milliGRAM(s) Oral daily  lisinopril 20 milliGRAM(s) Oral daily  nicotine - 21 mG/24Hr(s) Patch 1 Patch Transdermal daily  pantoprazole    Tablet 40 milliGRAM(s) Oral before breakfast  remdesivir  IVPB   IV Intermittent   remdesivir  IVPB 100 milliGRAM(s) IV Intermittent every 24 hours  tiotropium 2.5 MICROgram(s) Inhaler 2 Puff(s) Inhalation daily  trihexyphenidyl 2 milliGRAM(s) Oral two times a day    Vital Signs Last 24 Hrs  T(F): 97.5 (26 Jul 2024 13:16), Max: 98 (26 Jul 2024 04:48)  HR: 66 (26 Jul 2024 13:16) (58 - 66)  BP: 112/64 (26 Jul 2024 13:16) (112/64 - 130/61)  RR: 18 (26 Jul 2024 13:16) (18 - 18)  SpO2: 96% (26 Jul 2024 13:16) (96% - 98%)  I&O's Summary      PHYSICAL EXAM:  General: NAD, A/O x 3  ENT: MMM  Neck: Supple, No JVD  Lungs: left upper lobe crackle   Cardio: RRR, S1/S2, 2/6 systolic murmur   Abdomen: Soft, Nontender, Nondistended; Bowel sounds present  Extremities: No cyanosis, +3 LE edema    LABS:                        13.3   9.07  )-----------( 287      ( 25 Jul 2024 08:11 )             42.5     07-26    x   |  x   |  x   ----------------------------<  x   x    |  x   |  0.6    Ca    9.9      25 Jul 2024 08:11  Phos  3.9     07-25  Mg     2.8     07-24    TPro  6.5  /  Alb  4.0  /  TBili  <0.2  /  DBili  <0.2  /  AST  11  /  ALT  10  /  AlkPhos  110  07-26      PT/INR - ( 26 Jul 2024 08:42 )   PT: 10.80 sec;   INR: 0.95 ratio                                   Urinalysis Basic - ( 25 Jul 2024 08:11 )    Color: x / Appearance: x / SG: x / pH: x  Gluc: 79 mg/dL / Ketone: x  / Bili: x / Urobili: x   Blood: x / Protein: x / Nitrite: x   Leuk Esterase: x / RBC: x / WBC x   Sq Epi: x / Non Sq Epi: x / Bacteria: x            RADIOLOGY & ADDITIONAL TESTS:    Care Discussed with Consultants/Other Providers: 
SUBJECTIVE/OVERNIGHT EVENTS  Today is hospital day 3d. This morning patient was seen and examined at bedside, resting comfortably in bed. No acute or major events overnight.    HOSPITAL COURSE    Pt is a 68-year-old female w/ a PMH of HFPEF, bipolar disorder, asthma/COPD, 2L 02 at home, presented to the ED due to positive COVID test and shortness of breath.  Patient was having some cough, congestion since 7/21, got COVID tested 7/23 that was positive. Patient admitted to medicine floors for COPD exacerbation likely due to COVID.       MEDICATIONS  STANDING MEDICATIONS  ARIPiprazole 5 milliGRAM(s) Oral daily  bisacodyl 5 milliGRAM(s) Oral at bedtime  budesonide 160 MICROgram(s)/formoterol 4.5 MICROgram(s) Inhaler 2 Puff(s) Inhalation two times a day  chlorhexidine 2% Cloths 1 Application(s) Topical daily  dexAMETHasone     Tablet 6 milliGRAM(s) Oral daily  enoxaparin Injectable 40 milliGRAM(s) SubCutaneous every 24 hours  guaifenesin/dextromethorphan Oral Liquid 10 milliLiter(s) Oral every 6 hours  hydrochlorothiazide 25 milliGRAM(s) Oral daily  lisinopril 10 milliGRAM(s) Oral daily  lisinopril 20 milliGRAM(s) Oral daily  nicotine - 21 mG/24Hr(s) Patch 1 Patch Transdermal daily  pantoprazole    Tablet 40 milliGRAM(s) Oral before breakfast  remdesivir  IVPB   IV Intermittent   remdesivir  IVPB 100 milliGRAM(s) IV Intermittent every 24 hours  tiotropium 2.5 MICROgram(s) Inhaler 2 Puff(s) Inhalation daily  trihexyphenidyl 2 milliGRAM(s) Oral two times a day    PRN MEDICATIONS  acetaminophen     Tablet .. 650 milliGRAM(s) Oral every 6 hours PRN  albuterol    90 MICROgram(s) HFA Inhaler 2 Puff(s) Inhalation every 6 hours PRN  AQUAPHOR (petrolatum Ointment) 1 Application(s) Topical at bedtime PRN  benzonatate 100 milliGRAM(s) Oral three times a day PRN    VITALS  T(F): 98 (07-26-24 @ 04:48), Max: 98 (07-26-24 @ 04:48)  HR: 58 (07-26-24 @ 04:48) (58 - 69)  BP: 116/62 (07-26-24 @ 04:48) (116/62 - 170/77)  RR: 18 (07-26-24 @ 04:48) (16 - 18)  SpO2: 98% (07-26-24 @ 04:48) (90% - 98%)    PHYSICAL EXAM  GENERAL  (x  ) NAD, lying in bed comfortably     (  ) obtunded     (  ) lethargic     (  ) somnolent    HEAD  (  ) Atraumatic     (  ) hematoma     (  ) laceration (specify location:       )     NECK  (  ) Supple     (  ) neck stiffness     (  ) nuchal rigidity     (  )  no JVD     (  ) JVD present ( -- cm)    HEART  Rate -->  (x  ) normal rate    (  ) bradycardic    (  ) tachycardic  Rhythm -->  (x  ) regular    (  ) regularly irregular    (  ) irregularly irregular  Murmurs -->  (  ) normal s1/s2    (  ) systolic murmur    (  ) diastolic murmur    (  ) continuous murmur     (  ) S3 present    (  ) S4 present    LUNGS  (x  )Unlabored respirations     (  ) tachypnea  (  ) B/L air entry     (  ) decreased breath sounds in:  (location     )    (  ) no adventitious sound     (  ) crackles     (  x) wheezing - diffuse     (  ) rhonchi      (specify location:       )  (  ) chest wall tenderness (specify location:       )  - on 4L NC    ABDOMEN  - distended    EXTREMITIES  - rough scaly in b/l lower LE     NERVOUS SYSTEM  (  ) A&Ox3     (  ) confused     (  ) lethargic  CN II-XII:     (  ) Intact     (  ) focal deficits  (Specify:     )   Upper extremities:     (  ) strength X/5     (  ) focal deficit (specify:    )  Lower extremities:     (  ) strength  X/5    (  ) focal deficit (specify:    )    SKIN  (  ) No rashes or lesions     (  ) maculopapular rash     (  ) pustules     (  ) vesicles     (  ) ulcer     (  ) ecchymosis     (specify location:     )    (  ) Indwelling Segundo Catheter   Date insterted:    Reason (  ) Critical illness     (  ) urinary retention    (  ) Accurate Ins/Outs Monitoring     (  ) CMO patient    (  ) Central Line  Date inserted:  Location: (  ) Right IJ   (  ) Left IJ   (  ) Right Fem   (  ) Left Fem    (  ) SPC  (  ) pigtail  (  ) PEG tube  (  ) colostomy  (  ) jejunostomy  (  ) U-Dall    LABS             13.3   9.07  )-----------( 287      ( 07-25-24 @ 08:11 )             42.5     135  |  97  |  16  -------------------------<  79   07-25-24 @ 08:11  5.1  |  28  |  0.5    Ca      9.9     07-25-24 @ 08:11  Phos   3.9     07-25-24 @ 08:11  Mg     2.8     07-24-24 @ 15:45    TPro  6.6  /  Alb  4.0  /  TBili  <0.2  /  DBili  <0.2  /  AST  10  /  ALT  9   /  AlkPhos  102  /  GGT  x     07-25-24 @ 08:11    PT/INR - ( 07-25-24 @ 08:11 )   PT: 10.80 sec;   INR: 0.95 ratio      Urinalysis Basic - ( 25 Jul 2024 08:11 )    Color: x / Appearance: x / SG: x / pH: x  Gluc: 79 mg/dL / Ketone: x  / Bili: x / Urobili: x   Blood: x / Protein: x / Nitrite: x   Leuk Esterase: x / RBC: x / WBC x   Sq Epi: x / Non Sq Epi: x / Bacteria: x          IMAGING
SUBJECTIVE/OVERNIGHT EVENTS  Today is hospital day 1d. This morning patient was seen and examined at bedside, resting comfortably in bed. No acute or major events overnight.    HOSPITAL COURSE  Pt is a 68-year-old female w/ a PMH of HFPEF, bipolar disorder, asthma/COPD, 2L 02 at home, presented to the ED due to positive COVID test and shortness of breath.    Patient was having some cough, congestion since 7/21, got COVID tested 7/23 that was positive. Patient admitted to medicine floors for COPD exacerbation likely due to COVID.       MEDICATIONS  STANDING MEDICATIONS  ARIPiprazole 5 milliGRAM(s) Oral daily  bisacodyl 5 milliGRAM(s) Oral at bedtime  budesonide 160 MICROgram(s)/formoterol 4.5 MICROgram(s) Inhaler 2 Puff(s) Inhalation two times a day  chlorhexidine 2% Cloths 1 Application(s) Topical daily  dexAMETHasone  Injectable 6 milliGRAM(s) IV Push daily  enoxaparin Injectable 40 milliGRAM(s) SubCutaneous every 24 hours  lisinopril 10 milliGRAM(s) Oral daily  nicotine - 21 mG/24Hr(s) Patch 1 Patch Transdermal daily  pantoprazole    Tablet 40 milliGRAM(s) Oral before breakfast  remdesivir  IVPB   IV Intermittent   remdesivir  IVPB 100 milliGRAM(s) IV Intermittent every 24 hours  tiotropium 2.5 MICROgram(s) Inhaler 2 Puff(s) Inhalation daily  trihexyphenidyl 2 milliGRAM(s) Oral two times a day    PRN MEDICATIONS  albuterol    90 MICROgram(s) HFA Inhaler 2 Puff(s) Inhalation every 6 hours PRN    VITALS  T(F): 97 (07-24-24 @ 06:10), Max: 98.2 (07-23-24 @ 16:01)  HR: 59 (07-24-24 @ 06:10) (59 - 75)  BP: 146/80 (07-24-24 @ 06:10) (146/80 - 187/78)  RR: 19 (07-24-24 @ 06:10) (18 - 20)  SpO2: 93% (07-23-24 @ 21:47) (92% - 98%)    PHYSICAL EXAM  GENERAL  ( x ) NAD, lying in bed comfortably     (  ) obtunded     (  ) lethargic     (  ) somnolent    HEAD  (  ) Atraumatic     (  ) hematoma     (  ) laceration (specify location:       )     NECK  (  ) Supple     (  ) neck stiffness     (  ) nuchal rigidity     (  )  no JVD     (  ) JVD present ( -- cm)    HEART  Rate -->  (  x) normal rate    (  ) bradycardic    (  ) tachycardic  Rhythm -->  (x  ) regular    (  ) regularly irregular    (  ) irregularly irregular  Murmurs -->  (  ) normal s1/s2    (  ) systolic murmur    (  ) diastolic murmur    (  ) continuous murmur     (  ) S3 present    (  ) S4 present    LUNGS  ( x )Unlabored respirations     (  ) tachypnea  (x  ) B/L air entry     (  ) decreased breath sounds in:  (location     )    (  ) no adventitious sound     (  ) crackles     (  ) wheezing      (  ) rhonchi      (specify location:       )  (  ) chest wall tenderness (specify location:       )  -no wheezing    ABDOMEN  -distended and tense    EXTREMITIES  - rough scaly in b/l lower LE     NERVOUS SYSTEM  (x  ) A&Ox3     (  ) confused     (  ) lethargic  CN II-XII:     (  ) Intact     (  ) focal deficits  (Specify:     )   Upper extremities:     (  ) strength X/5     (  ) focal deficit (specify:    )  Lower extremities:     (  ) strength  X/5    (  ) focal deficit (specify:    )      LABS             13.2   3.80  )-----------( 265      ( 07-23-24 @ 14:57 )             43.2     x   |  x   |  x   -------------------------<  x    07-24-24 @ 01:00  x   |  x   |  0.5    Ca      9.5     07-23-24 @ 14:57    TPro  6.6  /  Alb  4.1  /  TBili  <0.2  /  DBili  <0.2  /  AST  13  /  ALT  13  /  AlkPhos  110  /  GGT  x     07-24-24 @ 01:00    PT/INR - ( 07-24-24 @ 01:00 )   PT: 11.50 sec;   INR: 1.01 ratio      Urinalysis Basic - ( 23 Jul 2024 14:57 )    Color: x / Appearance: x / SG: x / pH: x  Gluc: 163 mg/dL / Ketone: x  / Bili: x / Urobili: x   Blood: x / Protein: x / Nitrite: x   Leuk Esterase: x / RBC: x / WBC x   Sq Epi: x / Non Sq Epi: x / Bacteria: x          IMAGING

## 2024-07-29 NOTE — DISCHARGE NOTE PROVIDER - NSFOLLOWUPCLINICS_GEN_ALL_ED_FT
[facility] Vascular Surgery  Vascular Surgery  [address] [place] [address]  Phone: [phone]  Fax:   Follow Up Time: 1 month

## 2024-07-29 NOTE — DISCHARGE NOTE PROVIDER - ATTENDING DISCHARGE PHYSICAL EXAMINATION:
Vital Signs Last 24 Hrs  T(F): 96.8 (30 Jul 2024 05:39), Max: 98.3 (29 Jul 2024 17:30)  HR: 58 (30 Jul 2024 05:39) (58 - 82)  BP: 129/78 (30 Jul 2024 05:39) (105/66 - 129/78)  RR: 18 (30 Jul 2024 05:39) (18 - 18)  SpO2: 98% (29 Jul 2024 19:40) (95% - 98%)    PHYSICAL EXAM:  GENERAL: NAD, well-groomed, well-developed  HEAD:  Atraumatic, Normocephalic  EYES: EOMI, conjunctiva and sclera clear  ENMT: Moist mucous membranes, Good dentition, no thrush  NECK: Supple, No JVD  CHEST/LUNG: diminished bilaterally, no crackles   HEART: RRR; S1/S2, No murmur  ABDOMEN: Soft, Nontender, Nondistended; Bowel sounds present  VASCULAR: Normal pulses, Normal capillary refill  EXTREMITIES: No calf tenderness, No cyanosis, No edema  LYMPH: Normal; No lymphadenopathy noted  SKIN: Warm, Intact  PSYCH: Normal mood, Normal affect  NERVOUS SYSTEM:  A/O x3, poor concentration

## 2024-07-29 NOTE — PROGRESS NOTE ADULT - PROVIDER SPECIALTY LIST ADULT
Hospitalist
Hospitalist
Internal Medicine
Internal Medicine
Hospitalist
Internal Medicine
Hospitalist
Internal Medicine

## 2024-07-29 NOTE — DISCHARGE NOTE PROVIDER - CARE PROVIDERS DIRECT ADDRESSES
[email or web address]

## 2024-07-29 NOTE — DISCHARGE NOTE PROVIDER - NSDCMRMEDTOKEN_GEN_ALL_CORE_FT
Abilify 5 mg oral tablet: 1 tab(s) orally once a day (at bedtime)  Albuterol (Eqv-ProAir HFA) 90 mcg/inh inhalation aerosol: 2 puff(s) inhaled every 6 hours as needed for  bronchospasm  Artane 2 mg oral tablet: 2 tab(s) orally 2 times a day  budesonide-formoterol 160 mcg-4.5 mcg/inh inhalation aerosol: 2 puff(s) inhaled 2 times a day  Dulcolax Laxative 5 mg oral tablet: 4 tab(s) orally once a day  GoLYTELY oral powder for reconstitution: 1 orally once a day  nicotine 21 mg/24 hr transdermal film, extended release: 1 film(s) transdermal once a day  pantoprazole 40 mg oral delayed release tablet: 1 tab(s) orally every 12 hours  tiotropium 2.5 mcg/inh inhalation aerosol: 2 puff(s) inhaled once a day  Zestril 10 mg oral tablet: 1 tab(s) orally once a day   Abilify 5 mg oral tablet: 1 tab(s) orally once a day (at bedtime)  Albuterol (Eqv-ProAir HFA) 90 mcg/inh inhalation aerosol: 2 puff(s) inhaled every 6 hours as needed for  bronchospasm  Artane 2 mg oral tablet: 2 tab(s) orally 2 times a day  budesonide-formoterol 160 mcg-4.5 mcg/inh inhalation aerosol: 2 puff(s) inhaled 2 times a day  dexAMETHasone 6 mg oral tablet: 1 tab(s) orally once a day Last day should be taken 8/1  Dulcolax Laxative 5 mg oral tablet: 4 tab(s) orally once a day  guaiFENesin-dextromethorphan 100 mg-10 mg/5 mL oral liquid: 10 milliliter(s) orally every 6 hours  hydroCHLOROthiazide 25 mg oral tablet: 1 tab(s) orally once a day  nicotine 21 mg/24 hr transdermal film, extended release: 1 film(s) transdermal once a day  pantoprazole 40 mg oral delayed release tablet: 1 tab(s) orally every 12 hours  tiotropium 2.5 mcg/inh inhalation aerosol: 2 puff(s) inhaled once a day  Zestril 10 mg oral tablet: 1 tab(s) orally once a day

## 2024-07-29 NOTE — DISCHARGE NOTE PROVIDER - PROVIDER TOKENS
PROVIDER:[TOKEN:[[id number]],FOLLOWUP:[2 weeks],ESTABLISHEDPATIENT:[T]] PROVIDER:[TOKEN:[[id number]],FOLLOWUP:[2 weeks],ESTABLISHEDPATIENT:[T]],PROVIDER:[TOKEN:[[id number]],FOLLOWUP:[2 weeks]]

## 2024-07-29 NOTE — DISCHARGE NOTE PROVIDER - NSDCCPCAREPLAN_GEN_ALL_CORE_FT
PRINCIPAL DISCHARGE DIAGNOSIS  Diagnosis: COPD exacerbation  Assessment and Plan of Treatment:       SECONDARY DISCHARGE DIAGNOSES  Diagnosis: 2019 novel coronavirus disease (COVID-19)  Assessment and Plan of Treatment:     Diagnosis: Hypoxia  Assessment and Plan of Treatment:      PRINCIPAL DISCHARGE DIAGNOSIS  Diagnosis: COPD exacerbation  Assessment and Plan of Treatment: You were admitted due to a COPD exacerbation triggered by COVID-19 and have improved with treatment. At home, continue your prescribed medications, including inhaled corticosteroids, bronchodilators, and oral antibiotics, and use oxygen therapy if directed. Follow up with your primary care physician as scheduled. Engage in activity as tolerated, avoid respiratory irritants, stay hydrated, and use inhalers correctly. Contact your doctor if you experience worsening symptoms like increased shortness of breath or chest pain, or seek emergency care if needed. For any questions, reach out to your healthcare provider.      SECONDARY DISCHARGE DIAGNOSES  Diagnosis: 2019 novel coronavirus disease (COVID-19)  Assessment and Plan of Treatment:     Diagnosis: Hypoxia  Assessment and Plan of Treatment:

## 2024-07-29 NOTE — DISCHARGE NOTE PROVIDER - HOSPITAL COURSE
Ms. Mariah Neal is a 68 year old female with PMHx of HFpEF, bipolar, asthma/COPD on 2L O2 at home presented to the ED for shortness of breath and a positive COVID test. She initially felt the SOB with cough and congestion on 7/21, and came to the hospital after she tested herself positive for COVID on 7/23. Hospital course was complicated by COPD exacerbation secondary to COVID, and treated with 5 day course of Remdesivir and 10 days of dexamethasone, and chronic venous stasis for which she was evaluated by wound care who recommended outpatient vascular follow-up. Patient was weaned down to home O2 requirements and discharged with routine PCP follow-up. Ms. Mariah Neal is a 68 year old female with PMHx of HFpEF, bipolar, asthma/COPD on 2L O2 at home presented to the ED for shortness of breath and a positive COVID test. She initially felt the SOB with cough and congestion on 7/21, and came to the hospital after she tested herself positive for COVID on 7/23. Hospital course was complicated by COPD exacerbation secondary to COVID, and treated with 5 day course of Remdesivir and 10 days of dexamethasone, and chronic venous stasis for which she was evaluated by wound care who recommended outpatient vascular follow-up. Patient was weaned down to home O2 requirements and discharged with routine PCP follow-up.     Patient discharged to subacute rehab

## 2024-07-29 NOTE — DISCHARGE NOTE PROVIDER - CARE PROVIDER_API CALL
Serge [place]  Internal Medicine  [address] [place] [address]  Phone: [phone]  Fax: [phone]  Established Patient  Follow Up Time: 2 weeks   Serge Brain  Internal Medicine  [address] [place] [address]  Phone: [phone]  Fax: [phone]  Established Patient  Follow Up Time: 2 weeks    Rowdy Easley  Vascular Surgery  [address] [place] [address], [address]  [place] [address]  Phone: [phone]  Fax: [phone]  Follow Up Time: 2 weeks

## 2024-07-30 ENCOUNTER — TRANSCRIPTION ENCOUNTER (OUTPATIENT)
Age: 69
End: 2024-07-30

## 2024-07-30 VITALS
HEART RATE: 67 BPM | OXYGEN SATURATION: 97 % | RESPIRATION RATE: 19 BRPM | TEMPERATURE: 97 F | SYSTOLIC BLOOD PRESSURE: 121 MMHG | DIASTOLIC BLOOD PRESSURE: 61 MMHG

## 2024-07-30 LAB
ALBUMIN SERPL ELPH-MCNC: 4.4 G/DL — SIGNIFICANT CHANGE UP (ref 3.5–5.2)
ALBUMIN SERPL ELPH-MCNC: 4.5 G/DL — SIGNIFICANT CHANGE UP (ref 3.5–5.2)
ALP SERPL-CCNC: 103 U/L — SIGNIFICANT CHANGE UP (ref 30–115)
ALP SERPL-CCNC: 104 U/L — SIGNIFICANT CHANGE UP (ref 30–115)
ALT FLD-CCNC: 14 U/L — SIGNIFICANT CHANGE UP (ref 0–41)
ALT FLD-CCNC: 14 U/L — SIGNIFICANT CHANGE UP (ref 0–41)
ANION GAP SERPL CALC-SCNC: 12 MMOL/L — SIGNIFICANT CHANGE UP (ref 7–14)
AST SERPL-CCNC: 11 U/L — SIGNIFICANT CHANGE UP (ref 0–41)
AST SERPL-CCNC: 11 U/L — SIGNIFICANT CHANGE UP (ref 0–41)
BASOPHILS # BLD AUTO: 0.07 K/UL — SIGNIFICANT CHANGE UP (ref 0–0.2)
BASOPHILS NFR BLD AUTO: 0.6 % — SIGNIFICANT CHANGE UP (ref 0–1)
BILIRUB DIRECT SERPL-MCNC: <0.2 MG/DL — SIGNIFICANT CHANGE UP (ref 0–0.3)
BILIRUB INDIRECT FLD-MCNC: SIGNIFICANT CHANGE UP MG/DL (ref 0.2–1.2)
BILIRUB SERPL-MCNC: 0.3 MG/DL — SIGNIFICANT CHANGE UP (ref 0.2–1.2)
BILIRUB SERPL-MCNC: <0.2 MG/DL — SIGNIFICANT CHANGE UP (ref 0.2–1.2)
BUN SERPL-MCNC: 21 MG/DL — HIGH (ref 10–20)
CALCIUM SERPL-MCNC: 9.5 MG/DL — SIGNIFICANT CHANGE UP (ref 8.4–10.5)
CHLORIDE SERPL-SCNC: 88 MMOL/L — LOW (ref 98–110)
CO2 SERPL-SCNC: 28 MMOL/L — SIGNIFICANT CHANGE UP (ref 17–32)
CREAT SERPL-MCNC: 0.6 MG/DL — LOW (ref 0.7–1.5)
EGFR: 98 ML/MIN/1.73M2 — SIGNIFICANT CHANGE UP
EOSINOPHIL # BLD AUTO: 0.01 K/UL — SIGNIFICANT CHANGE UP (ref 0–0.7)
EOSINOPHIL NFR BLD AUTO: 0.1 % — SIGNIFICANT CHANGE UP (ref 0–8)
GLUCOSE SERPL-MCNC: 96 MG/DL — SIGNIFICANT CHANGE UP (ref 70–99)
HCT VFR BLD CALC: 45.7 % — SIGNIFICANT CHANGE UP (ref 37–47)
HGB BLD-MCNC: 14.2 G/DL — SIGNIFICANT CHANGE UP (ref 12–16)
IMM GRANULOCYTES NFR BLD AUTO: 1.5 % — HIGH (ref 0.1–0.3)
INR BLD: 0.87 RATIO — SIGNIFICANT CHANGE UP (ref 0.65–1.3)
LYMPHOCYTES # BLD AUTO: 1.48 K/UL — SIGNIFICANT CHANGE UP (ref 1.2–3.4)
LYMPHOCYTES # BLD AUTO: 13.7 % — LOW (ref 20.5–51.1)
MAGNESIUM SERPL-MCNC: 2.1 MG/DL — SIGNIFICANT CHANGE UP (ref 1.8–2.4)
MCHC RBC-ENTMCNC: 24.2 PG — LOW (ref 27–31)
MCHC RBC-ENTMCNC: 31.1 G/DL — LOW (ref 32–37)
MCV RBC AUTO: 78 FL — LOW (ref 81–99)
MONOCYTES # BLD AUTO: 0.52 K/UL — SIGNIFICANT CHANGE UP (ref 0.1–0.6)
MONOCYTES NFR BLD AUTO: 4.8 % — SIGNIFICANT CHANGE UP (ref 1.7–9.3)
NEUTROPHILS # BLD AUTO: 8.58 K/UL — HIGH (ref 1.4–6.5)
NEUTROPHILS NFR BLD AUTO: 79.3 % — HIGH (ref 42.2–75.2)
NRBC # BLD: 0 /100 WBCS — SIGNIFICANT CHANGE UP (ref 0–0)
PLATELET # BLD AUTO: 290 K/UL — SIGNIFICANT CHANGE UP (ref 130–400)
PMV BLD: 9.1 FL — SIGNIFICANT CHANGE UP (ref 7.4–10.4)
POTASSIUM SERPL-MCNC: 5.1 MMOL/L — HIGH (ref 3.5–5)
POTASSIUM SERPL-SCNC: 5.1 MMOL/L — HIGH (ref 3.5–5)
PROT SERPL-MCNC: 6.5 G/DL — SIGNIFICANT CHANGE UP (ref 6–8)
PROT SERPL-MCNC: 6.6 G/DL — SIGNIFICANT CHANGE UP (ref 6–8)
PROTHROM AB SERPL-ACNC: 9.9 SEC — LOW (ref 9.95–12.87)
RBC # BLD: 5.86 M/UL — HIGH (ref 4.2–5.4)
RBC # FLD: 22 % — HIGH (ref 11.5–14.5)
SODIUM SERPL-SCNC: 128 MMOL/L — LOW (ref 135–146)
WBC # BLD: 10.82 K/UL — HIGH (ref 4.8–10.8)
WBC # FLD AUTO: 10.82 K/UL — HIGH (ref 4.8–10.8)

## 2024-07-30 PROCEDURE — 99239 HOSP IP/OBS DSCHRG MGMT >30: CPT

## 2024-07-30 RX ORDER — GUAIFENESIN/DEXTROMETHORPHAN 100-10MG/5
10 SYRUP ORAL
Qty: 0 | Refills: 0 | DISCHARGE
Start: 2024-07-30

## 2024-07-30 RX ORDER — HYDROCHLOROTHIAZIDE 25 MG
1 TABLET ORAL
Qty: 0 | Refills: 0 | DISCHARGE
Start: 2024-07-30

## 2024-07-30 RX ORDER — DEXAMETHASONE 1.5 MG/1
1 TABLET ORAL
Qty: 0 | Refills: 0 | DISCHARGE
Start: 2024-07-30

## 2024-07-30 RX ADMIN — Medication 1 PATCH: at 11:45

## 2024-07-30 RX ADMIN — LISINOPRIL 20 MILLIGRAM(S): 10 TABLET ORAL at 06:08

## 2024-07-30 RX ADMIN — Medication 10 MILLILITER(S): at 11:44

## 2024-07-30 RX ADMIN — LISINOPRIL 10 MILLIGRAM(S): 10 TABLET ORAL at 06:08

## 2024-07-30 RX ADMIN — Medication 10 MILLILITER(S): at 06:12

## 2024-07-30 RX ADMIN — Medication 2 MILLIGRAM(S): at 06:55

## 2024-07-30 RX ADMIN — CHLORHEXIDINE GLUCONATE 1 APPLICATION(S): 500 CLOTH TOPICAL at 11:45

## 2024-07-30 RX ADMIN — DEXAMETHASONE 6 MILLIGRAM(S): 1.5 TABLET ORAL at 06:07

## 2024-07-30 RX ADMIN — PANTOPRAZOLE SODIUM 40 MILLIGRAM(S): 20 TABLET, DELAYED RELEASE ORAL at 06:08

## 2024-07-30 RX ADMIN — ENOXAPARIN SODIUM 40 MILLIGRAM(S): 120 INJECTION SUBCUTANEOUS at 06:08

## 2024-07-30 NOTE — DISCHARGE NOTE NURSING/CASE MANAGEMENT/SOCIAL WORK - PATIENT PORTAL LINK FT
Addended by: JAMES ABERNATHY on: 8/31/2023 12:08 PM     Modules accepted: Orders     You can access the FollowMyHealth Patient Portal offered by [facility] by registering at the following website: [email or web address]. By joining [employer]’s FollowMyHealth portal, you will also be able to view your health information using other applications (apps) compatible with our system.

## 2024-07-31 ENCOUNTER — NON-APPOINTMENT (OUTPATIENT)
Age: 69
End: 2024-07-31

## 2024-08-06 DIAGNOSIS — F31.9 BIPOLAR DISORDER, UNSPECIFIED: ICD-10-CM

## 2024-08-06 DIAGNOSIS — E66.9 OBESITY, UNSPECIFIED: ICD-10-CM

## 2024-08-06 DIAGNOSIS — J44.1 CHRONIC OBSTRUCTIVE PULMONARY DISEASE WITH (ACUTE) EXACERBATION: ICD-10-CM

## 2024-08-06 DIAGNOSIS — I11.0 HYPERTENSIVE HEART DISEASE WITH HEART FAILURE: ICD-10-CM

## 2024-08-06 DIAGNOSIS — Z99.81 DEPENDENCE ON SUPPLEMENTAL OXYGEN: ICD-10-CM

## 2024-08-06 DIAGNOSIS — I50.32 CHRONIC DIASTOLIC (CONGESTIVE) HEART FAILURE: ICD-10-CM

## 2024-08-06 DIAGNOSIS — J12.82 PNEUMONIA DUE TO CORONAVIRUS DISEASE 2019: ICD-10-CM

## 2024-08-06 DIAGNOSIS — I87.2 VENOUS INSUFFICIENCY (CHRONIC) (PERIPHERAL): ICD-10-CM

## 2024-08-06 DIAGNOSIS — Z91.012 ALLERGY TO EGGS: ICD-10-CM

## 2024-08-06 DIAGNOSIS — Z88.0 ALLERGY STATUS TO PENICILLIN: ICD-10-CM

## 2024-08-06 DIAGNOSIS — J96.01 ACUTE RESPIRATORY FAILURE WITH HYPOXIA: ICD-10-CM

## 2024-08-06 DIAGNOSIS — D84.89 OTHER IMMUNODEFICIENCIES: ICD-10-CM

## 2024-08-06 DIAGNOSIS — J44.0 CHRONIC OBSTRUCTIVE PULMONARY DISEASE WITH (ACUTE) LOWER RESPIRATORY INFECTION: ICD-10-CM

## 2024-08-06 DIAGNOSIS — U07.1 COVID-19: ICD-10-CM

## 2024-08-06 DIAGNOSIS — Z79.899 OTHER LONG TERM (CURRENT) DRUG THERAPY: ICD-10-CM

## 2024-12-20 ENCOUNTER — APPOINTMENT (OUTPATIENT)
Dept: GASTROENTEROLOGY | Facility: CLINIC | Age: 69
End: 2024-12-20

## 2025-05-14 ENCOUNTER — INPATIENT (INPATIENT)
Facility: HOSPITAL | Age: 70
LOS: 1 days | Discharge: HOME HEALTH | DRG: 291 | End: 2025-05-16
Attending: INTERNAL MEDICINE | Admitting: INTERNAL MEDICINE
Payer: MEDICARE

## 2025-05-14 VITALS
DIASTOLIC BLOOD PRESSURE: 83 MMHG | OXYGEN SATURATION: 99 % | RESPIRATION RATE: 17 BRPM | TEMPERATURE: 98 F | SYSTOLIC BLOOD PRESSURE: 134 MMHG | HEART RATE: 83 BPM

## 2025-05-14 DIAGNOSIS — Z98.890 OTHER SPECIFIED POSTPROCEDURAL STATES: Chronic | ICD-10-CM

## 2025-05-14 DIAGNOSIS — L03.90 CELLULITIS, UNSPECIFIED: ICD-10-CM

## 2025-05-14 DIAGNOSIS — Z98.891 HISTORY OF UTERINE SCAR FROM PREVIOUS SURGERY: Chronic | ICD-10-CM

## 2025-05-14 LAB
ALBUMIN SERPL ELPH-MCNC: 4.1 G/DL — SIGNIFICANT CHANGE UP (ref 3.5–5.2)
ALP SERPL-CCNC: 134 U/L — HIGH (ref 30–115)
ALT FLD-CCNC: 14 U/L — SIGNIFICANT CHANGE UP (ref 0–41)
ANION GAP SERPL CALC-SCNC: 12 MMOL/L — SIGNIFICANT CHANGE UP (ref 7–14)
ANISOCYTOSIS BLD QL: SLIGHT — SIGNIFICANT CHANGE UP
AST SERPL-CCNC: 15 U/L — SIGNIFICANT CHANGE UP (ref 0–41)
BASE EXCESS BLDV CALC-SCNC: 11.2 MMOL/L — HIGH (ref -2–3)
BASOPHILS # BLD AUTO: 0.14 K/UL — SIGNIFICANT CHANGE UP (ref 0–0.2)
BASOPHILS NFR BLD AUTO: 1.7 % — HIGH (ref 0–1)
BILIRUB SERPL-MCNC: 0.4 MG/DL — SIGNIFICANT CHANGE UP (ref 0.2–1.2)
BUN SERPL-MCNC: 8 MG/DL — LOW (ref 10–20)
CA-I SERPL-SCNC: 1.22 MMOL/L — SIGNIFICANT CHANGE UP (ref 1.15–1.33)
CALCIUM SERPL-MCNC: 9.6 MG/DL — SIGNIFICANT CHANGE UP (ref 8.4–10.5)
CHLORIDE SERPL-SCNC: 91 MMOL/L — LOW (ref 98–110)
CO2 SERPL-SCNC: 30 MMOL/L — SIGNIFICANT CHANGE UP (ref 17–32)
CREAT SERPL-MCNC: 0.5 MG/DL — LOW (ref 0.7–1.5)
EGFR: 101 ML/MIN/1.73M2 — SIGNIFICANT CHANGE UP
EGFR: 101 ML/MIN/1.73M2 — SIGNIFICANT CHANGE UP
EOSINOPHIL # BLD AUTO: 0 K/UL — SIGNIFICANT CHANGE UP (ref 0–0.7)
EOSINOPHIL NFR BLD AUTO: 0 % — SIGNIFICANT CHANGE UP (ref 0–8)
FLUAV AG NPH QL: SIGNIFICANT CHANGE UP
FLUBV AG NPH QL: SIGNIFICANT CHANGE UP
GAS PNL BLDV: 129 MMOL/L — LOW (ref 136–145)
GAS PNL BLDV: SIGNIFICANT CHANGE UP
GAS PNL BLDV: SIGNIFICANT CHANGE UP
GIANT PLATELETS BLD QL SMEAR: PRESENT — SIGNIFICANT CHANGE UP
GLUCOSE SERPL-MCNC: 98 MG/DL — SIGNIFICANT CHANGE UP (ref 70–99)
HCO3 BLDV-SCNC: 40 MMOL/L — HIGH (ref 22–29)
HCT VFR BLD CALC: 38.3 % — SIGNIFICANT CHANGE UP (ref 37–47)
HCT VFR BLDA CALC: 37 % — SIGNIFICANT CHANGE UP (ref 34.5–46.5)
HGB BLD CALC-MCNC: 12.3 G/DL — SIGNIFICANT CHANGE UP (ref 11.7–16.1)
HGB BLD-MCNC: 12 G/DL — SIGNIFICANT CHANGE UP (ref 12–16)
HYPOCHROMIA BLD QL: SLIGHT — SIGNIFICANT CHANGE UP
LACTATE BLDV-MCNC: 1.4 MMOL/L — SIGNIFICANT CHANGE UP (ref 0.5–2)
LYMPHOCYTES # BLD AUTO: 1.09 K/UL — LOW (ref 1.2–3.4)
LYMPHOCYTES # BLD AUTO: 13.2 % — LOW (ref 20.5–51.1)
MANUAL SMEAR VERIFICATION: SIGNIFICANT CHANGE UP
MCHC RBC-ENTMCNC: 24.5 PG — LOW (ref 27–31)
MCHC RBC-ENTMCNC: 31.3 G/DL — LOW (ref 32–37)
MCV RBC AUTO: 78.2 FL — LOW (ref 81–99)
MICROCYTES BLD QL: SLIGHT — SIGNIFICANT CHANGE UP
MONOCYTES # BLD AUTO: 0.29 K/UL — SIGNIFICANT CHANGE UP (ref 0.1–0.6)
MONOCYTES NFR BLD AUTO: 3.5 % — SIGNIFICANT CHANGE UP (ref 1.7–9.3)
NEUTROPHILS # BLD AUTO: 6.31 K/UL — SIGNIFICANT CHANGE UP (ref 1.4–6.5)
NEUTROPHILS NFR BLD AUTO: 76.3 % — HIGH (ref 42.2–75.2)
NT-PROBNP SERPL-SCNC: 89 PG/ML — SIGNIFICANT CHANGE UP (ref 0–300)
PCO2 BLDV: 74 MMHG — CRITICAL HIGH (ref 39–42)
PH BLDV: 7.34 — SIGNIFICANT CHANGE UP (ref 7.32–7.43)
PLAT MORPH BLD: NORMAL — SIGNIFICANT CHANGE UP
PLATELET # BLD AUTO: 283 K/UL — SIGNIFICANT CHANGE UP (ref 130–400)
PMV BLD: 8.9 FL — SIGNIFICANT CHANGE UP (ref 7.4–10.4)
PO2 BLDV: 20 MMHG — LOW (ref 25–45)
POLYCHROMASIA BLD QL SMEAR: SIGNIFICANT CHANGE UP
POTASSIUM BLDV-SCNC: 4.4 MMOL/L — SIGNIFICANT CHANGE UP (ref 3.5–5.1)
POTASSIUM SERPL-MCNC: 4.3 MMOL/L — SIGNIFICANT CHANGE UP (ref 3.5–5)
POTASSIUM SERPL-SCNC: 4.3 MMOL/L — SIGNIFICANT CHANGE UP (ref 3.5–5)
PROT SERPL-MCNC: 6.5 G/DL — SIGNIFICANT CHANGE UP (ref 6–8)
RBC # BLD: 4.9 M/UL — SIGNIFICANT CHANGE UP (ref 4.2–5.4)
RBC # FLD: 20.1 % — HIGH (ref 11.5–14.5)
RBC BLD AUTO: ABNORMAL
RSV RNA NPH QL NAA+NON-PROBE: SIGNIFICANT CHANGE UP
SAO2 % BLDV: 34.3 % — LOW (ref 67–88)
SARS-COV-2 RNA SPEC QL NAA+PROBE: SIGNIFICANT CHANGE UP
SMUDGE CELLS # BLD: PRESENT — SIGNIFICANT CHANGE UP
SODIUM SERPL-SCNC: 133 MMOL/L — LOW (ref 135–146)
SOURCE RESPIRATORY: SIGNIFICANT CHANGE UP
VARIANT LYMPHS # BLD: 5.3 % — HIGH (ref 0–5)
VARIANT LYMPHS NFR BLD MANUAL: 5.3 % — HIGH (ref 0–5)
WBC # BLD: 8.27 K/UL — SIGNIFICANT CHANGE UP (ref 4.8–10.8)
WBC # FLD AUTO: 8.27 K/UL — SIGNIFICANT CHANGE UP (ref 4.8–10.8)

## 2025-05-14 PROCEDURE — 97110 THERAPEUTIC EXERCISES: CPT | Mod: GP

## 2025-05-14 PROCEDURE — 36415 COLL VENOUS BLD VENIPUNCTURE: CPT

## 2025-05-14 PROCEDURE — 93970 EXTREMITY STUDY: CPT | Mod: 26

## 2025-05-14 PROCEDURE — 99285 EMERGENCY DEPT VISIT HI MDM: CPT

## 2025-05-14 PROCEDURE — 71045 X-RAY EXAM CHEST 1 VIEW: CPT | Mod: 26

## 2025-05-14 PROCEDURE — 93010 ELECTROCARDIOGRAM REPORT: CPT

## 2025-05-14 PROCEDURE — 0241U: CPT

## 2025-05-14 PROCEDURE — 99223 1ST HOSP IP/OBS HIGH 75: CPT

## 2025-05-14 PROCEDURE — 94640 AIRWAY INHALATION TREATMENT: CPT

## 2025-05-14 PROCEDURE — 85379 FIBRIN DEGRADATION QUANT: CPT

## 2025-05-14 PROCEDURE — 97162 PT EVAL MOD COMPLEX 30 MIN: CPT | Mod: GP

## 2025-05-14 RX ORDER — BISACODYL 5 MG
5 TABLET, DELAYED RELEASE (ENTERIC COATED) ORAL AT BEDTIME
Refills: 0 | Status: DISCONTINUED | OUTPATIENT
Start: 2025-05-14 | End: 2025-05-16

## 2025-05-14 RX ORDER — MONTELUKAST SODIUM 10 MG/1
1 TABLET ORAL
Refills: 0 | DISCHARGE

## 2025-05-14 RX ORDER — POLYETHYLENE GLYCOL 3350 17 G/17G
17 POWDER, FOR SOLUTION ORAL
Refills: 0 | DISCHARGE

## 2025-05-14 RX ORDER — HYDROCHLOROTHIAZIDE 50 MG/1
1 TABLET ORAL
Refills: 0 | DISCHARGE

## 2025-05-14 RX ORDER — SIMETHICONE 80 MG
1 TABLET,CHEWABLE ORAL
Refills: 0 | DISCHARGE

## 2025-05-14 RX ORDER — ARIPIPRAZOLE 2 MG/1
5 TABLET ORAL DAILY
Refills: 0 | Status: DISCONTINUED | OUTPATIENT
Start: 2025-05-14 | End: 2025-05-16

## 2025-05-14 RX ORDER — LISINOPRIL 5 MG/1
10 TABLET ORAL DAILY
Refills: 0 | Status: DISCONTINUED | OUTPATIENT
Start: 2025-05-14 | End: 2025-05-16

## 2025-05-14 RX ORDER — ATORVASTATIN CALCIUM 80 MG/1
20 TABLET, FILM COATED ORAL AT BEDTIME
Refills: 0 | Status: DISCONTINUED | OUTPATIENT
Start: 2025-05-14 | End: 2025-05-16

## 2025-05-14 RX ORDER — TIOTROPIUM BROMIDE INHALATION SPRAY 3.12 UG/1
2 SPRAY, METERED RESPIRATORY (INHALATION) DAILY
Refills: 0 | Status: DISCONTINUED | OUTPATIENT
Start: 2025-05-14 | End: 2025-05-16

## 2025-05-14 RX ORDER — ALBUTEROL SULFATE 2.5 MG/3ML
2 VIAL, NEBULIZER (ML) INHALATION EVERY 6 HOURS
Refills: 0 | Status: DISCONTINUED | OUTPATIENT
Start: 2025-05-14 | End: 2025-05-16

## 2025-05-14 RX ORDER — POLYETHYLENE GLYCOL 3350 17 G/17G
17 POWDER, FOR SOLUTION ORAL DAILY
Refills: 0 | Status: DISCONTINUED | OUTPATIENT
Start: 2025-05-14 | End: 2025-05-16

## 2025-05-14 RX ORDER — CLINDAMYCIN PHOSPHATE 150 MG/ML
600 VIAL (ML) INJECTION ONCE
Refills: 0 | Status: COMPLETED | OUTPATIENT
Start: 2025-05-14 | End: 2025-05-14

## 2025-05-14 RX ORDER — IPRATROPIUM BROMIDE AND ALBUTEROL SULFATE .5; 2.5 MG/3ML; MG/3ML
3 SOLUTION RESPIRATORY (INHALATION) EVERY 6 HOURS
Refills: 0 | Status: DISCONTINUED | OUTPATIENT
Start: 2025-05-14 | End: 2025-05-16

## 2025-05-14 RX ORDER — FUROSEMIDE 10 MG/ML
40 INJECTION INTRAMUSCULAR; INTRAVENOUS DAILY
Refills: 0 | Status: DISCONTINUED | OUTPATIENT
Start: 2025-05-14 | End: 2025-05-16

## 2025-05-14 RX ORDER — LISINOPRIL 5 MG/1
1 TABLET ORAL
Refills: 0 | DISCHARGE

## 2025-05-14 RX ORDER — ENOXAPARIN SODIUM 100 MG/ML
40 INJECTION SUBCUTANEOUS EVERY 24 HOURS
Refills: 0 | Status: DISCONTINUED | OUTPATIENT
Start: 2025-05-14 | End: 2025-05-16

## 2025-05-14 RX ORDER — METHYLPREDNISOLONE ACETATE 80 MG/ML
125 INJECTION, SUSPENSION INTRA-ARTICULAR; INTRALESIONAL; INTRAMUSCULAR; SOFT TISSUE ONCE
Refills: 0 | Status: COMPLETED | OUTPATIENT
Start: 2025-05-14 | End: 2025-05-14

## 2025-05-14 RX ORDER — MONTELUKAST SODIUM 10 MG/1
10 TABLET ORAL DAILY
Refills: 0 | Status: DISCONTINUED | OUTPATIENT
Start: 2025-05-14 | End: 2025-05-16

## 2025-05-14 RX ORDER — IPRATROPIUM BROMIDE AND ALBUTEROL SULFATE .5; 2.5 MG/3ML; MG/3ML
3 SOLUTION RESPIRATORY (INHALATION)
Refills: 0 | Status: DISCONTINUED | OUTPATIENT
Start: 2025-05-14 | End: 2025-05-16

## 2025-05-14 RX ORDER — FUROSEMIDE 10 MG/ML
1 INJECTION INTRAMUSCULAR; INTRAVENOUS
Refills: 0 | DISCHARGE

## 2025-05-14 RX ORDER — TRIHEXYPHENIDYL HYDROCHLORIDE 2 MG/1
2 TABLET ORAL
Refills: 0 | Status: DISCONTINUED | OUTPATIENT
Start: 2025-05-14 | End: 2025-05-16

## 2025-05-14 RX ORDER — ATORVASTATIN CALCIUM 80 MG/1
1 TABLET, FILM COATED ORAL
Refills: 0 | DISCHARGE

## 2025-05-14 RX ADMIN — Medication 5 MILLIGRAM(S): at 21:31

## 2025-05-14 RX ADMIN — ARIPIPRAZOLE 5 MILLIGRAM(S): 2 TABLET ORAL at 21:31

## 2025-05-14 RX ADMIN — IPRATROPIUM BROMIDE AND ALBUTEROL SULFATE 3 MILLILITER(S): .5; 2.5 SOLUTION RESPIRATORY (INHALATION) at 12:40

## 2025-05-14 RX ADMIN — METHYLPREDNISOLONE ACETATE 125 MILLIGRAM(S): 80 INJECTION, SUSPENSION INTRA-ARTICULAR; INTRALESIONAL; INTRAMUSCULAR; SOFT TISSUE at 13:08

## 2025-05-14 RX ADMIN — TRIHEXYPHENIDYL HYDROCHLORIDE 2 MILLIGRAM(S): 2 TABLET ORAL at 18:45

## 2025-05-14 RX ADMIN — ATORVASTATIN CALCIUM 20 MILLIGRAM(S): 80 TABLET, FILM COATED ORAL at 21:31

## 2025-05-14 RX ADMIN — Medication 100 MILLIGRAM(S): at 15:20

## 2025-05-14 RX ADMIN — FUROSEMIDE 40 MILLIGRAM(S): 10 INJECTION INTRAMUSCULAR; INTRAVENOUS at 18:39

## 2025-05-14 RX ADMIN — ENOXAPARIN SODIUM 40 MILLIGRAM(S): 100 INJECTION SUBCUTANEOUS at 18:39

## 2025-05-14 RX ADMIN — IPRATROPIUM BROMIDE AND ALBUTEROL SULFATE 3 MILLILITER(S): .5; 2.5 SOLUTION RESPIRATORY (INHALATION) at 13:09

## 2025-05-14 NOTE — H&P ADULT - NSHPPHYSICALEXAM_GEN_ALL_CORE
CONSTITUTIONAL: in no acute distress.  SKIN: Skin exam is warm and dry, no acute rash.  HEAD: Normocephalic; atraumatic.  EYES: PERRL, EOM intact; conjunctiva and sclera clear.  ENT: No nasal discharge; airway clear.   NECK: Supple; non tender.  CARD: S1, S2 normal; no murmurs, gallops, or rubs. Regular rate and rhythm.  RESP: No wheezes, rales or rhonchi. Speaking in full sentences. sat is 99% on 4L  ABD: Normal bowel sounds; soft; non-distended; non-tender; No rebound or guarding. No CVA tenderness.  EXT: decreased ROM with erythema and swelling b/l to LE with left LE larger than right, TTP of LE b/l  NEURO: Alert, oriented. Grossly unremarkable. No focal deficits

## 2025-05-14 NOTE — ED ADULT TRIAGE NOTE - CHIEF COMPLAINT QUOTE
Patient BIBA from assisted living with complaints of worsening bilateral leg swelling for 1 week L>R. Patient uses oxygen as needed but was not wearing it on EMS arrival and was hypoxic to 83% on RA, improving with nasal cannula. Patient denies SOB, CP.

## 2025-05-14 NOTE — ED PROVIDER NOTE - CLINICAL SUMMARY MEDICAL DECISION MAKING FREE TEXT BOX
69F PMH COPD O2 NC, CHF, bipolar, p/w 1 week worsening bilat LE edema and pain and redness. no cp, sob. states her SW was concerned about her breathing and sent pt to ED. no trauma.  no fever, cough.    on exam, AFVSS, well chiara nad, ncat, eomi, perrla, mmm, poor air entry, tight, dec breath sounds bilaterally, rrr nl s1s2 no mrg, abd soft ntnd, aaox3, no focal deficits, 2+ bilat  pitting le edema  and warmth and erythema and tenderness, no calf ttp, 2+ dp pulse    a/p; mild copd exac, LE cellulitis, labs, cxr doppler performed, nebs/steroids abx given, satting well on O2 NC, admit for further treatment    Labs were ordered and reviewed.  Imaging was ordered and reviewed by me.  Appropriate medications for patient's presenting complaints were ordered and effects were reassessed.  Patient's records (prior hospital, ED visit, and/or nursing home notes if available) were reviewed.  Additional history was obtained from EMS, family, and/or PCP (where available).  Escalation to admission/observation was considered.  Patient requires inpatient hospitalization - monitored setting.

## 2025-05-14 NOTE — ED PROVIDER NOTE - OBJECTIVE STATEMENT
69-year-old female history of COPD on 4 L home O2, asthma, schizoaffective disorder presenting to ED for evaluation of bilateral leg swelling.  As per patient she is coming from [facility] was seen by her  today who noted the swelling in patient was also endorsing shortness of breath.  Here in ED patient denies feeling short of breath but states that she does have pain bilaterally to her lower extremities.  Denies any CP, SOB, fevers, chills, N, V

## 2025-05-14 NOTE — H&P ADULT - ATTENDING COMMENTS
69-year-old female with a past medical history of  HFpEF, bipolar disorder, asthma/COPD( on home O2) who presents to the ED with increasing LE swelling over the past few days. She was admitted for a possible exacerbation of HF.      Agree  with assessment  except for changes below.   Vital Signs Last 24 Hrs  T(C): 36.4 (14 May 2025 22:18), Max: 36.7 (14 May 2025 10:11)  T(F): 97.5 (14 May 2025 22:18), Max: 98.1 (14 May 2025 10:11)  HR: 72 (14 May 2025 22:18) (72 - 83)  BP: 124/70 (14 May 2025 22:18) (124/70 - 134/83)  BP(mean): 94 (14 May 2025 21:56) (94 - 94)  RR: 18 (14 May 2025 22:18) (17 - 18)  SpO2: 96% (14 May 2025 22:18) (92% - 99%)    Parameters below as of 14 May 2025 21:56  Patient On (Oxygen Delivery Method): nasal cannula  O2 Flow (L/min): 4     TTE 5/5/24 Normal global left ventricular systolic function with a biplane EF of 60%. Mild (grade 1) diastolic dysfunction. No regional wall motion     Chest x-ray Pulmonary vascular congestion, similar compared with prior.      IMPRESSION  Acute  Hypoxic and  Hypercapnic  Respiratory Failure Likely  HFp EF Exacerbation   Complicated by  Hx COPD   Clinially Volume  Overloased  LE [place] Chest X-Ray vascular Congestion   BNP likely  falsely  Low Given  BMI of 40.6  Patient  Not Adhearing to HF Diet   Hyponatremia  likely Dilutional   ECG  NSR RBBB   c/w furosemide 40mg IV Daily , ASA, Statin, Lisinopril   Admit to telemetry  daily standing weights; strict I's & O's; 1.5L fluid restrict  monitor 'lytes and replete accordingly (K>4; Mg>2)   check lipid panel, A1c, and TSH  Obtain TTE  Titrate supplemental O2 to maintain SpO2 92-96%;   Avoid hyperoxia and wean off O2 gradually. Symbicort 160-4.5mcg 2 puffs BID  Spiriva 2.5 2 puffss daily. Nebulizer treatment Q4H PRN..   BIPAP  for now  Repeat ABG  Send  iron  Studies         Lower Extremity Selling  with Possible Cellulitis   Likely chronic Venous Changes   - F/u ESR and CRP  - F/u BCX  - F/u nasal MRSA  - Compression stockings and wound care eval  - wound care: Cleanse bilateral lower legs with soap and water. Pat dry apply Dermaphor, leave open to air twice a day and prn for soiling.   - F/u LE DUPLEX and D-d  - Fluid restriction and target net neg  - low Na diet        Hx HTN  -c/w home meds    Hx Bipolar disorder  -c/w Abilify / Artane 69-year-old female with a past medical history of  HFpEF, bipolar disorder, asthma/COPD( on home O2) who presents to the ED with increasing LE swelling over the past few days. She was admitted for a possible exacerbation of HF.      Agree  with assessment  except for changes below.   Vital Signs Last 24 Hrs  T(C): 36.4 (14 May 2025 22:18), Max: 36.7 (14 May 2025 10:11)  T(F): 97.5 (14 May 2025 22:18), Max: 98.1 (14 May 2025 10:11)  HR: 72 (14 May 2025 22:18) (72 - 83)  BP: 124/70 (14 May 2025 22:18) (124/70 - 134/83)  BP(mean): 94 (14 May 2025 21:56) (94 - 94)  RR: 18 (14 May 2025 22:18) (17 - 18)  SpO2: 96% (14 May 2025 22:18) (92% - 99%)    Parameters below as of 14 May 2025 21:56  Patient On (Oxygen Delivery Method): nasal cannula  O2 Flow (L/min): 4     TTE 5/5/24 Normal global left ventricular systolic function with a biplane EF of 60%. Mild (grade 1) diastolic dysfunction. No regional wall motion     Chest x-ray Pulmonary vascular congestion, similar compared with prior.      IMPRESSION  Acute  Hypoxic and  Hypercapnic  Respiratory Failure Likely  HFp EF Exacerbation   Complicated by  COPD mild wheezing on Exam   Clinically Volume  Overload  LE [place] Chest X-Ray vascular Congestion   BNP likely  falsely  Low Given  BMI of 40.6  Patient  Not Adhering to HF Diet   Hyponatremia  likely Dilutional   ECG  NSR RBBB   c/w furosemide 40mg IV Daily , ASA, Statin, Lisinopril   Admit to telemetry  daily standing weights; strict I's & O's; 1.5L fluid restrict  monitor 'lytes and replete accordingly (K>4; Mg>2)   check lipid panel, A1c, and TSH  Obtain TTE  Titrate supplemental O2 to maintain SpO2 92-96%;   Avoid hyperoxia and wean off O2 gradually. Symbicort 160-4.5mcg 2 puffs BID  Spiriva 2.5 2 puffss daily. Nebulizer treatment Q4H PRN..   Start Solumedol  40mg q12  BIPAP  for now  Repeat ABG  Send  iron  Studies         Lower Extremity Selling  not likely Cellulitis  Likely chronic Venous Changes   - F/u ESR and CRP  - F/u BCX  - F/u nasal MRSA  - Compression stockings and wound care eval  - wound care: Cleanse bilateral lower legs with soap and water. Pat dry apply Dermaphor, leave open to air twice a day and prn for soiling.   - F/u LE DUPLEX and D-d  - Fluid restriction and target net neg  - low Na diet        Hx HTN  -c/w home meds    Hx Bipolar disorder  -c/w Abilify / Artane    Seen on 05/14

## 2025-05-14 NOTE — ED ADULT NURSE NOTE - NSFALLRISKINTERV_ED_ALL_ED

## 2025-05-14 NOTE — PATIENT PROFILE ADULT - FALL HARM RISK - HARM RISK INTERVENTIONS

## 2025-05-14 NOTE — ED PROVIDER NOTE - ATTENDING APP SHARED VISIT CONTRIBUTION OF CARE
69F PMH COPD O2 NC, CHF, bipolar, p/w 1 week worsening bilat LE edema and pain and redness. no cp, sob. states her SW was concerned about her breathing and sent pt to ED. no trauma.  no fever, cough.    on exam, AFVSS, well chiara nad, ncat, eomi, perrla, mmm, poor air entry, tight, dec breath sounds bilaterally, rrr nl s1s2 no mrg, abd soft ntnd, aaox3, no focal deficits, 2+ bilat  pitting le edema  and warmth and erythema and tenderness, no calf ttp, 2+ dp pulse    a/p; mild copd exac, LE cellulitis, labs, cxr doppler performed, nebs/steroids abx given, satting well on O2 NC, admit for further treatment

## 2025-05-14 NOTE — H&P ADULT - ASSESSMENT
Assessment  This is a case of 69-year-old female with a past medical history of  HFpEF, bipolar disorder, asthma/COPD( on home O2) who presents to the ED with increasing LE swelling over the past few days.       Plan  #Increasing LE edema 2/2 HFpEF vs cellulitis  - TTE 5/5/24 Normal global left ventricular systolic function with a biplane EF of 60%. Mild (grade 1) diastolic dysfunction. No regional wall motion   abnormalities  - Only on HCTZ at home   - Will switch to IV lasix 40 daily for now and can DC on PO lasix 40 depending on clinical progression.  - Low suspicion for cellulitis but will start cefazolin for now, more likely chronic changes  - F/u ESR and CRP  - F/u BCX  - F/u nasal MRSA  - Compression stockings and wound care eval  - wound care: Cleanse bilateral lower legs with soap and water. Pat dry apply Dermaphor, leave open to air twice a day and prn for soiling.   - F/u LE DUPLEX and D-d  - Fluid restriction and target net neg  - low Na diet    #COPD  -Patient uses 2L O2 at home as needed. No increase O2 requirements.   -c/w inhalers, Albuterol as needed. Standing LABA/ICS and LAMA  - Seems like a chronic retainer of CO2  - IF AMS get stat ABG and place on BIPAP    - wound care: Cleanse bilateral lower legs with soap and water. Pat dry apply Dermaphor, leave open to air twice a day and prn for soiling.   - Recommend follow up with Vascular out patient per wound care    #HTN  -c/w home meds    #Bipolar disorder  -c/w Abilify / Artane     DVT prophylaxis: Lovenox 40 mg daily sq  PPT ppx: Pantoprazole 40 mg daily  Diet: DASH     Abilify 5 mg oral tablet: 1 tab(s) orally once a day (at bedtime)  Albuterol (Eqv-ProAir HFA) 90 mcg/inh inhalation aerosol: 2 puff(s) inhaled every 6 hours as needed for  bronchospasm  Artane 2 mg oral tablet: 2 tab(s) orally 2 times a day  budesonide-formoterol 160 mcg-4.5 mcg/inh inhalation aerosol: 2 puff(s) inhaled 2 times a day  dexAMETHasone 6 mg oral tablet: 1 tab(s) orally once a day Last day should be taken 8/1  Dulcolax Laxative 5 mg oral tablet: 4 tab(s) orally once a day  guaiFENesin-dextromethorphan 100 mg-10 mg/5 mL oral liquid: 10 milliliter(s) orally every 6 hours  hydroCHLOROthiazide 25 mg oral tablet: 1 tab(s) orally once a day  nicotine 21 mg/24 hr transdermal film, extended release: 1 film(s) transdermal once a day  pantoprazole 40 mg oral delayed release tablet: 1 tab(s) orally every 12 hours  tiotropium 2.5 mcg/inh inhalation aerosol: 2 puff(s) inhaled once a day  Zestril 10 mg oral tablet: 1 tab(s) orally once a day     Assessment  This is a case of 69-year-old female with a past medical history of  HFpEF, bipolar disorder, asthma/COPD( on home O2) who presents to the ED with increasing LE swelling over the past few days. She was admitted for a possible exacerbation of HF.      Plan  #Increasing LE edema 2/2 HFpEF (mild exacerbation) vs cellulitis  - Pt states that she has hyponatremia and that a doctor told her to increase her salt intake. More likely dilutional hyponatremia from fluid overload.  - CHF exacerbation likely 2/2 dietary non compliance.   - TTE 5/5/24 Normal global left ventricular systolic function with a biplane EF of 60%. Mild (grade 1) diastolic dysfunction. No regional wall motion   abnormalities  - Only on HCTZ at home   - Will switch to IV lasix 40 daily for now and can DC on PO lasix 40 depending on clinical progression.  - Low suspicion for cellulitis more likely chronic changes, hold off abx for now  - F/u ESR and CRP  - F/u BCX  - F/u nasal MRSA  - Compression stockings and wound care eval  - wound care: Cleanse bilateral lower legs with soap and water. Pat dry apply Dermaphor, leave open to air twice a day and prn for soiling.   - F/u LE DUPLEX and D-d  - Fluid restriction and target net neg  - low Na diet    #COPD not in exacerbation  -Patient uses 2L O2 at home as needed. No increase O2 requirements.   -c/w inhalers, Albuterol as needed. Standing LABA/ICS and LAMA  - Seems like a chronic retainer of CO2  - IF AMS get stat ABG and place on BIPAP    #HTN  -c/w home meds    #Bipolar disorder  -c/w Abilify / Artane     DVT prophylaxis: Lovenox 40 mg daily sq  PPT ppx: Pantoprazole 40 mg daily  Diet: DASH/ fluid restrict

## 2025-05-14 NOTE — H&P ADULT - NSHPLABSRESULTS_GEN_ALL_CORE
LABS:                          12.0   8.27  )-----------( 283      ( 14 May 2025 11:50 )             38.3     05-14    133[L]  |  91[L]  |  8[L]  ----------------------------<  98  4.3   |  30  |  0.5[L]    Ca    9.6      14 May 2025 11:50    TPro  6.5  /  Alb  4.1  /  TBili  0.4  /  DBili  x   /  AST  15  /  ALT  14  /  AlkPhos  134[H]  05-14

## 2025-05-14 NOTE — H&P ADULT - HISTORY OF PRESENT ILLNESS
This is a case of 69-year-old female with a past medical history of  HFpEF, bipolar disorder, asthma/COPD( on home O2) who presents to the ED with increasing LE swelling over the past few days.      This is a case of 69-year-old female with a past medical history of  HFpEF, bipolar disorder, asthma/COPD( on home O2) who presents to the ED with increasing LE swelling over the past few days.     In the ED,  Vital Signs Last 24 Hrs  T(C): 36.7 (14 May 2025 10:11), Max: 36.7 (14 May 2025 10:11)  T(F): 98.1 (14 May 2025 10:11), Max: 98.1 (14 May 2025 10:11)  HR: 83 (14 May 2025 10:11) (83 - 83)  BP: 134/83 (14 May 2025 10:11) (134/83 - 134/83)  BP(mean): --  RR: 17 (14 May 2025 10:11) (17 - 17)  SpO2: 99% (14 May 2025 10:11) (99% - 99%)    Parameters below as of 14 May 2025 10:11  Patient On (Oxygen Delivery Method): nasal cannula  O2 Flow (L/min): 4    Labs significant for a VBG with normal Ph and CO2 of 74 appears to be chronic)    CXR showing mildly increased congestion      This is a case of 69-year-old female with a past medical history of  HFpEF, bipolar disorder, asthma/COPD( on home O2) who presents to the ED with increasing LE swelling over the past few days.  As per patient she is coming from [facility] and was seen by her  today who noted the swelling. She also complain s of SOB. She states that she does have pain bilaterally to her lower extremities.  Denies any CP, fevers, chills, nause, vomitting, sick contacts and increased sputum production.    In the ED,  Vital Signs Last 24 Hrs  T(C): 36.7 (14 May 2025 10:11), Max: 36.7 (14 May 2025 10:11)  T(F): 98.1 (14 May 2025 10:11), Max: 98.1 (14 May 2025 10:11)  HR: 83 (14 May 2025 10:11) (83 - 83)  BP: 134/83 (14 May 2025 10:11) (134/83 - 134/83)  BP(mean): --  RR: 17 (14 May 2025 10:11) (17 - 17)  SpO2: 99% (14 May 2025 10:11) (99% - 99%)    Parameters below as of 14 May 2025 10:11  Patient On (Oxygen Delivery Method): nasal cannula  O2 Flow (L/min): 4    Labs significant for a VBG with normal Ph and CO2 of 74 appears to be chronic)    CXR showing mildly increased congestion

## 2025-05-14 NOTE — ED PROVIDER NOTE - PHYSICAL EXAMINATION
VITAL SIGNS: I have reviewed nursing notes and confirm.  CONSTITUTIONAL:in no acute distress.  SKIN: Skin exam is warm and dry, no acute rash.  HEAD: Normocephalic; atraumatic.  EYES: PERRL, EOM intact; conjunctiva and sclera clear.  ENT: No nasal discharge; airway clear.   NECK: Supple; non tender.  CARD: S1, S2 normal; no murmurs, gallops, or rubs. Regular rate and rhythm.  RESP: No wheezes, rales or rhonchi. Speaking in full sentences. sat is 99% on home O2 dose of 4%  ABD: Normal bowel sounds; soft; non-distended; non-tender; No rebound or guarding. No CVA tenderness.  EXT: decreased ROM with erythema and swelling b/l to LE with left LE larger than right, TTP of LE b/l  NEURO: Alert, oriented. Grossly unremarkable. No focal deficits.

## 2025-05-15 LAB — D DIMER BLD IA.RAPID-MCNC: 189 NG/ML DDU — SIGNIFICANT CHANGE UP

## 2025-05-15 PROCEDURE — 99232 SBSQ HOSP IP/OBS MODERATE 35: CPT

## 2025-05-15 RX ADMIN — ATORVASTATIN CALCIUM 20 MILLIGRAM(S): 80 TABLET, FILM COATED ORAL at 21:41

## 2025-05-15 RX ADMIN — ARIPIPRAZOLE 5 MILLIGRAM(S): 2 TABLET ORAL at 11:14

## 2025-05-15 RX ADMIN — TRIHEXYPHENIDYL HYDROCHLORIDE 2 MILLIGRAM(S): 2 TABLET ORAL at 17:47

## 2025-05-15 RX ADMIN — Medication 5 MILLIGRAM(S): at 21:39

## 2025-05-15 RX ADMIN — Medication 1 DOSE(S): at 20:51

## 2025-05-15 RX ADMIN — TRIHEXYPHENIDYL HYDROCHLORIDE 2 MILLIGRAM(S): 2 TABLET ORAL at 06:04

## 2025-05-15 RX ADMIN — LISINOPRIL 10 MILLIGRAM(S): 5 TABLET ORAL at 06:05

## 2025-05-15 RX ADMIN — MONTELUKAST SODIUM 10 MILLIGRAM(S): 10 TABLET ORAL at 11:14

## 2025-05-15 RX ADMIN — TIOTROPIUM BROMIDE INHALATION SPRAY 2 PUFF(S): 3.12 SPRAY, METERED RESPIRATORY (INHALATION) at 08:27

## 2025-05-15 RX ADMIN — Medication 1 DOSE(S): at 08:28

## 2025-05-15 RX ADMIN — Medication 1 APPLICATION(S): at 06:05

## 2025-05-15 RX ADMIN — Medication 40 MILLIGRAM(S): at 06:04

## 2025-05-15 RX ADMIN — IPRATROPIUM BROMIDE AND ALBUTEROL SULFATE 3 MILLILITER(S): .5; 2.5 SOLUTION RESPIRATORY (INHALATION) at 10:41

## 2025-05-15 RX ADMIN — FUROSEMIDE 40 MILLIGRAM(S): 10 INJECTION INTRAMUSCULAR; INTRAVENOUS at 06:04

## 2025-05-15 RX ADMIN — ENOXAPARIN SODIUM 40 MILLIGRAM(S): 100 INJECTION SUBCUTANEOUS at 17:47

## 2025-05-15 NOTE — PROGRESS NOTE ADULT - ASSESSMENT
Assessment  This is a case of 69-year-old female with a past medical history of  HFpEF, bipolar disorder, asthma/COPD( on home O2) who presents to the ED with increasing LE swelling over the past few days. She was admitted for a possible exacerbation of HF.      Plan  # CHF exacerbation likely 2/2 dietary non compliance.   # LE Edema vs Venostasis   - Pt states that she has hyponatremia and that a doctor told her to increase her salt intake. More likely dilutional hyponatremia from fluid overload.  - TTE 5/5/24 Normal global left ventricular systolic function with a biplane EF of 60%. Mild (grade 1) diastolic dysfunction. No regional wall motion   abnormalities  - Only on HCTZ at home   - Low suspicion for cellulitis more likely chronic changes, hold off abx for now  - BCX- PENDING  - Compression stockings and wound care eval  - wound care: Cleanse bilateral lower legs with soap and water. Pat dry apply Dermaphor, leave open to air twice a day and prn for soiling.   - Duplex BLE: No evidence of deep venous thrombosis in either lower extremity. Bilateral calf veins not visualized Bilateral inguinal lymphadenopathy present  - Fluid restriction and target net neg  - low Na diet  - cw IV lasix 40mg daily for now and can DC on PO lasix 40 depending on clinical progression.    #COPD not in exacerbation  -Patient uses 2L O2 at home as needed. No increase O2 requirements.   -c/w inhalers, Albuterol as needed. Standing LABA/ICS and LAMA  - Seems like a chronic retainer of CO2  - IF AMS get stat ABG and place on BIPAP    #HTN  -c/w home meds    #Bipolar disorder  -c/w Abilify / Artane     DVT prophylaxis: Lovenox 40 mg daily sq  PPT ppx: Pantoprazole 40 mg daily  Diet: DASH/ fluid restrict

## 2025-05-15 NOTE — PROGRESS NOTE ADULT - ATTENDING COMMENTS
69-year-old female with a past medical history of  HFpEF, bipolar disorder, asthma/COPD( on home O2) who presents to the ED with increasing LE swelling over the past few days. admitted for HFpEF exacerbation.    #Acute Hypoxic Respiratory Failure 2/2 HFpEF Exacerbation  #hyponatremia likely 2/2 fluid overload  #hx of asthma/COPD on home O2  - 2/2 dietary noncompliance  - TTE 5/5/24 Normal global left ventricular systolic function with a biplane EF of 60%. Mild (grade 1) diastolic dysfunction. No regional wall motion   - Chest x-ray Pulmonary vascular congestion, similar compared with prior.  - ECG  NSR RBBB   - hypervolemic on exam  - c/w Lasix 40mg IV daily  - daily weight, strict I&O's, fluid restriction    #bilateral LE swelling 2/2 chronic venous stasis  - D-dimer wnl, LE duplex negative for DVT  - f/u blood cultures  - wound care eval  - Fluid restriction and target net neg  - low Na diet      Hx HTN  -c/w home meds    Hx Bipolar disorder  -c/w Abilify / Artane    DVT ppx  Full Code    Anticipate 24-48 hrs, transition to PO lasix once euvolemic

## 2025-05-15 NOTE — PROGRESS NOTE ADULT - SUBJECTIVE AND OBJECTIVE BOX
SUBJECTIVE/OVERNIGHT EVENTS  Today is hospital day 1d. This morning patient was seen and examined at bedside, resting comfortably in bed. No acute or major events overnight.    CODE STATUS: FULL     MEDICATIONS  STANDING MEDICATIONS  albuterol/ipratropium for Nebulization.. 3 milliLiter(s) Nebulizer every 20 minutes  ARIPiprazole 5 milliGRAM(s) Oral daily  atorvastatin 20 milliGRAM(s) Oral at bedtime  bisacodyl 5 milliGRAM(s) Oral at bedtime  chlorhexidine 2% Cloths 1 Application(s) Topical <User Schedule>  enoxaparin Injectable 40 milliGRAM(s) SubCutaneous every 24 hours  fluticasone propionate/ salmeterol 250-50 MICROgram(s) Diskus 1 Dose(s) Inhalation two times a day  furosemide   Injectable 40 milliGRAM(s) IV Push daily  lisinopril 10 milliGRAM(s) Oral daily  montelukast 10 milliGRAM(s) Oral daily  pantoprazole    Tablet 40 milliGRAM(s) Oral before breakfast  polyethylene glycol 3350 17 Gram(s) Oral daily  tiotropium 2.5 MICROgram(s) Inhaler 2 Puff(s) Inhalation daily  trihexyphenidyl 2 milliGRAM(s) Oral two times a day    PRN MEDICATIONS  albuterol    90 MICROgram(s) HFA Inhaler 2 Puff(s) Inhalation every 6 hours PRN  albuterol/ipratropium for Nebulization 3 milliLiter(s) Nebulizer every 6 hours PRN    VITALS  T(F): 97.6 (05-15-25 @ 12:30), Max: 97.6 (05-15-25 @ 12:30)  HR: 73 (05-15-25 @ 12:30) (66 - 77)  BP: 122/73 (05-15-25 @ 12:30) (116/67 - 132/60)  RR: 19 (05-15-25 @ 12:30) (18 - 19)  SpO2: 94% (05-15-25 @ 12:30) (87% - 98%)    PHYSICAL EXAM  GENERAL  ( x ) NAD, lying in bed comfortably     (  ) obtunded     (  ) lethargic     (  ) somnolent    HEAD  ( x ) Atraumatic     (  ) hematoma     (  ) laceration (specify location:       )     NECK  ( x ) Supple     (  ) neck stiffness     (  ) nuchal rigidity     (  )  no JVD     (  ) JVD present ( -- cm)    HEART  Rate -->  ( x ) normal rate    (  ) bradycardic    (  ) tachycardic  Rhythm -->  ( x ) regular    (  ) regularly irregular    (  ) irregularly irregular  Murmurs -->  (  ) normal s1/s2    (  ) systolic murmur    (  ) diastolic murmur    (  ) continuous murmur     (  ) S3 present    (  ) S4 present    LUNGS  ( x )Unlabored respirations     (  ) tachypnea  ( x ) B/L air entry     (  ) decreased breath sounds in:  (location     )    (  ) no adventitious sound     (  ) crackles     (  ) wheezing      (  ) rhonchi      (specify location:       )  (  ) chest wall tenderness (specify location:       )    ABDOMEN  ( x ) Soft     (  ) tense   |   ( x ) nondistended     (  ) distended   |   (  ) +BS     (  ) hypoactive bowel sounds     (  ) hyperactive bowel sounds  (  ) nontender     (  ) RUQ tenderness     (  ) RLQ tenderness     (  ) LLQ tenderness     (  ) epigastric tenderness     (  ) diffuse tenderness  (  ) Splenomegaly      (  ) Hepatomegaly      (  ) Jaundice     (  ) ecchymosis     EXTREMITIES  ( x ) Normal     (  ) Rash     (  ) ecchymosis     (  ) varicose veins      (  ) pitting edema     (  ) non-pitting edema   (  ) ulceration     (  ) gangrene:     (location:     )    NERVOUS SYSTEM  (x  ) A&Ox3     (  ) confused     (  ) lethargic  CN II-XII:     (  ) Intact     (  ) focal deficits  (Specify:     )   Upper extremities:     (  ) strength X/5     (  ) focal deficit (specify:    )  Lower extremities:     (  ) strength  X/5    (  ) focal deficit (specify:    )    SKIN  ( x ) No rashes or lesions     (  ) maculopapular rash     (  ) pustules     (  ) vesicles     (  ) ulcer     (  ) ecchymosis     (specify location:     )      LABS             12.0   8.27  )-----------( 283      ( 05-14-25 @ 11:50 )             38.3     133  |  91  |  8   -------------------------<  98   05-14-25 @ 11:50  4.3  |  30  |  0.5    Ca      9.6     05-14-25 @ 11:50    TPro  6.5  /  Alb  4.1  /  TBili  0.4  /  DBili  x   /  AST  15  /  ALT  14  /  AlkPhos  134  /  GGT  x     05-14-25 @ 11:50      Pro-Brain Natriuretic Peptide: 89 pg/mL (05-14-25 @ 11:50)  Troponin T, High Sensitivity Result: 13 ng/L (05-14-25 @ 11:50)    Urinalysis Basic - ( 14 May 2025 11:50 )    Color: x / Appearance: x / SG: x / pH: x  Gluc: 98 mg/dL / Ketone: x  / Bili: x / Urobili: x   Blood: x / Protein: x / Nitrite: x   Leuk Esterase: x / RBC: x / WBC x   Sq Epi: x / Non Sq Epi: x / Bacteria: x          IMAGING

## 2025-05-15 NOTE — ADVANCED PRACTICE NURSE CONSULT - ASSESSMENT
Reason for Admission: LE edema  History of Present Illness:   This is a case of 69-year-old female with a past medical history of  HFpEF, bipolar disorder, asthma/COPD( on home O2) who presents to the ED with increasing LE swelling over the past few days.  As per patient she is coming from [facility] and was seen by her  today who noted the swelling. She also complain s of SOB. She states that she does have pain bilaterally to her lower extremities.  Denies any CP, fevers, chills, nause, vomitting, sick contacts and increased sputum production.       Allergies:  	penicillin: Drug, Short breath  	eggs: Food, Short breath    Home Medications:   * Patient Currently Takes Medications as of 30-Jul-2024 13:18 documented in Structured Notes  · 	guaiFENesin-dextromethorphan 100 mg-10 mg/5 mL oral liquid: 10 milliliter(s) orally every 6 hours  · 	hydroCHLOROthiazide 25 mg oral tablet: 1 tab(s) orally once a day  · 	dexAMETHasone 6 mg oral tablet: 1 tab(s) orally once a day Last day should be taken 8/1  · 	Dulcolax Laxative 5 mg oral tablet: 4 tab(s) orally once a day  · 	Zestril 10 mg oral tablet: 1 tab(s) orally once a day  · 	nicotine 21 mg/24 hr transdermal film, extended release: 1 film(s) transdermal once a day  · 	budesonide-formoterol 160 mcg-4.5 mcg/inh inhalation aerosol: 2 puff(s) inhaled 2 times a day  · 	tiotropium 2.5 mcg/inh inhalation aerosol: 2 puff(s) inhaled once a day  · 	pantoprazole 40 mg oral delayed release tablet: 1 tab(s) orally every 12 hours  · 	Albuterol (Eqv-ProAir HFA) 90 mcg/inh inhalation aerosol: 2 puff(s) inhaled every 6 hours as needed for  bronchospasm  · 	Artane 2 mg oral tablet: 2 tab(s) orally 2 times a day  · 	Abilify 5 mg oral tablet: 1 tab(s) orally once a day (at bedtime)    Patient received in bed, limited mobility, high risk for pressure injury development or progression.    Wound  Type & Location: Bilateral lower extremity lymphedema   Tissue Description: Red, tender and warm intact tissue – small, scattered scabs to right lower extremity , left lower extremity has xerosis with some peeling tissue  Wound Exudate: None   Wound Edge: Intact  Nat wound Condition: Intact

## 2025-05-15 NOTE — ADVANCED PRACTICE NURSE CONSULT - RECOMMEDATIONS
Cleanse lower extremities with soap and water, pat dry.  Apply Dermaphor/Aquaphor daily to lower extremities and leave open to air.  Vascular consult recommended for further work up if swelling and pain does not subside otherwise vascular consult recommended upon discharge.    Skin and incontinence care.  Pressure Injury Prevention.  Assess wound daily and inform primary provider of any changes.   Case discussed with primary RN.  Wound/ ostomy specialist to f/u as needed.   Other recommendations per Primary Team.

## 2025-05-16 ENCOUNTER — TRANSCRIPTION ENCOUNTER (OUTPATIENT)
Age: 70
End: 2025-05-16

## 2025-05-16 VITALS
TEMPERATURE: 99 F | RESPIRATION RATE: 18 BRPM | HEART RATE: 71 BPM | DIASTOLIC BLOOD PRESSURE: 57 MMHG | SYSTOLIC BLOOD PRESSURE: 111 MMHG

## 2025-05-16 PROCEDURE — 99239 HOSP IP/OBS DSCHRG MGMT >30: CPT

## 2025-05-16 RX ORDER — FUROSEMIDE 10 MG/ML
40 INJECTION INTRAMUSCULAR; INTRAVENOUS DAILY
Refills: 0 | Status: DISCONTINUED | OUTPATIENT
Start: 2025-05-16 | End: 2025-05-16

## 2025-05-16 RX ORDER — SILVER SULFADIAZINE 1 %
1 CREAM (GRAM) TOPICAL
Refills: 0 | Status: DISCONTINUED | OUTPATIENT
Start: 2025-05-16 | End: 2025-05-16

## 2025-05-16 RX ADMIN — Medication 40 MILLIGRAM(S): at 05:25

## 2025-05-16 RX ADMIN — TIOTROPIUM BROMIDE INHALATION SPRAY 2 PUFF(S): 3.12 SPRAY, METERED RESPIRATORY (INHALATION) at 08:11

## 2025-05-16 RX ADMIN — Medication 1 DOSE(S): at 08:11

## 2025-05-16 RX ADMIN — POLYETHYLENE GLYCOL 3350 17 GRAM(S): 17 POWDER, FOR SOLUTION ORAL at 11:00

## 2025-05-16 RX ADMIN — TRIHEXYPHENIDYL HYDROCHLORIDE 2 MILLIGRAM(S): 2 TABLET ORAL at 05:25

## 2025-05-16 RX ADMIN — FUROSEMIDE 40 MILLIGRAM(S): 10 INJECTION INTRAMUSCULAR; INTRAVENOUS at 05:32

## 2025-05-16 RX ADMIN — Medication 1 APPLICATION(S): at 05:25

## 2025-05-16 RX ADMIN — IPRATROPIUM BROMIDE AND ALBUTEROL SULFATE 3 MILLILITER(S): .5; 2.5 SOLUTION RESPIRATORY (INHALATION) at 14:58

## 2025-05-16 RX ADMIN — MONTELUKAST SODIUM 10 MILLIGRAM(S): 10 TABLET ORAL at 11:00

## 2025-05-16 RX ADMIN — ARIPIPRAZOLE 5 MILLIGRAM(S): 2 TABLET ORAL at 11:00

## 2025-05-16 NOTE — DISCHARGE NOTE PROVIDER - ATTENDING DISCHARGE PHYSICAL EXAM:
Attending Discharge Physical Examination: Spine appears normal, range of motion is not limited, no muscle or joint tenderness

## 2025-05-16 NOTE — PHYSICAL THERAPY INITIAL EVALUATION ADULT - PRECAUTIONS/LIMITATIONS, REHAB EVAL
respiratory precaution, on home O2 3-4 L via NC/cardiac precautions/fall precautions
cardiac precautions/fall precautions

## 2025-05-16 NOTE — PHYSICAL THERAPY INITIAL EVALUATION ADULT - GENERAL OBSERVATIONS, REHAB EVAL
10:15-10:26. 10:26. Pt encountered semifowler in bed in NAD, O2 3 lpm via NC off by pt stating she cannot breathe. PT educated pt that she cannot take O2 off but she said it's not comfortable. Sherif VALENCIA notified. Dr. Mckeon examined pt at b/s, educated pt on importance of + O2 and stated that pt may benefit from humidified air. Pt stated she cannot get OOB at this time. Sherif VALENCIA notified. Will f/u with PT as appropriate.
Pt encountered semifowler in bed in NAD, + O2 3.5 lpm via NC, primafit, received hygenic care prior to PT, agreeable to PT and OOB activity.

## 2025-05-16 NOTE — DISCHARGE NOTE PROVIDER - ATTENDING DISCHARGE PHYSICAL EXAMINATION:
T(F): 97.5 (05-16-25 @ 04:56), Max: 97.6 (05-15-25 @ 12:30)  HR: 62 (05-16-25 @ 04:56) (62 - 73)  BP: 115/70 (05-16-25 @ 07:03) (96/60 - 122/73)  RR: 18 (05-16-25 @ 04:56) (18 - 19)  SpO2: 97% (05-16-25 @ 04:56) (94% - 97%)  Physical exam:   constitutional NAD, AAOX3, Respiratory  lungs CTA, CVS heart RRR, GI: abdomen Soft NT, ND, BS+, skin: bilat lower ext lymphedema   neuro exam Motor, sensory and CN normal, no deficit

## 2025-05-16 NOTE — DISCHARGE NOTE PROVIDER - NSDCCPCAREPLAN_GEN_ALL_CORE_FT
PRINCIPAL DISCHARGE DIAGNOSIS  Diagnosis: Acute on chronic heart failure with preserved ejection fraction (HFpEF)  Assessment and Plan of Treatment: You came to the ED for worsening swelling in your legs. We then imaged you to rule out any clots and was negative. You were then given water pills through the IV and transitioned you to your home dose of water pills. Please follow up with your PCP in 2 weeks.     PRINCIPAL DISCHARGE DIAGNOSIS  Diagnosis: Acute on chronic heart failure with preserved ejection fraction (HFpEF)  Assessment and Plan of Treatment: You came to the ED for worsening swelling in your legs. We then imaged you to rule out any clots and was negative. You were then given diuretics ( to remove the extra water)  through the IV and transitioned you to your home dose of water pills.   - please take your meds as prescribed  - please check your weight daily in the morning after using the bathroom, if you notice more than 2-3 pounds a day, or more than 5 pounds a week, you will need to call your doctor asap to adjust your meds   - if you experience worsening sob or swelling, please contact your doctor asap   -Please follow up with your PCP in 2 weeks.      SECONDARY DISCHARGE DIAGNOSES  Diagnosis: Lymphedema  Assessment and Plan of Treatment: bilat lower ext lymphedema  - cleanse with soap and water, pat dry   - apply aquaphor daily to lower ext and leave open   - vascular clinic followup is recommended       Diagnosis: COPD with hypoxia  Assessment and Plan of Treatment: - cont oxygen as prescribed   - followup with your pulmonologist   - please avoid smoking and avoid being close to stove/fire, since you are on Oxygen, to avoid risk of fire  if you experience incrase shortness of breath, seek immediate medical care

## 2025-05-16 NOTE — PHYSICAL THERAPY INITIAL EVALUATION ADULT - ADDITIONAL COMMENTS
Pt lives in [facility] assisted living, amb using Rollator, had a SC but lost it, would like another one, [facility] with ADL, has + shower seat.

## 2025-05-16 NOTE — PHYSICAL THERAPY INITIAL EVALUATION ADULT - PERTINENT HX OF CURRENT PROBLEM, REHAB EVAL
This is a case of 69-year-old female admitted for LE edema. Pt with a past medical history of  HFpEF, bipolar disorder, asthma/COPD( on home O2) who presents to the ED with increasing LE swelling over the past few days.  As per patient she is coming from [facility] and was seen by her  today who noted the swelling. She also complain s of SOB. She states that she does have pain bilaterally to her lower extremities.  Denies any CP, fevers, chills, nause, vomitting, sick contacts and increased sputum production.
This is a case of 69-year-old female admitted for LE edema. Pt with a past medical history of  HFpEF, bipolar disorder, asthma/COPD( on home O2) who presents to the ED with increasing LE swelling over the past few days.  As per patient she is coming from [facility] and was seen by her  today who noted the swelling. She also complain s of SOB. She states that she does have pain bilaterally to her lower extremities.  Denies any CP, fevers, chills, nause, vomiting, sick contacts and increased sputum production.

## 2025-05-16 NOTE — DISCHARGE NOTE NURSING/CASE MANAGEMENT/SOCIAL WORK - NSDCPEFALRISK_GEN_ALL_CORE
For information on Fall & Injury Prevention, visit: [email or web address] OR  [email or web address] OR  https://www.cdc.gov/steadi/patient.html

## 2025-05-16 NOTE — DISCHARGE NOTE PROVIDER - CARE PROVIDER_API CALL
Serge Richmond  Internal Medicine  [address] [place] [address]  Phone: [phone]  Fax: [phone]  Follow Up Time: 2 weeks

## 2025-05-16 NOTE — DISCHARGE NOTE NURSING/CASE MANAGEMENT/SOCIAL WORK - FINANCIAL ASSISTANCE
[facility] provides services at a reduced cost to those who are determined to be eligible through [facility]’s financial assistance program. Information regarding [facility]’s financial assistance program can be found by going to [email or web address] or by calling [phone].

## 2025-05-16 NOTE — DISCHARGE NOTE PROVIDER - NSDCMRMEDTOKEN_GEN_ALL_CORE_FT
Abilify 5 mg oral tablet: 1 tab(s) orally once a day (at bedtime)  Albuterol (Eqv-ProAir HFA) 90 mcg/inh inhalation aerosol: 2 puff(s) inhaled every 6 hours as needed for  bronchospasm  Artane 2 mg oral tablet: 1 tab(s) orally 2 times a day  budesonide-formoterol 160 mcg-4.5 mcg/inh inhalation aerosol: 2 puff(s) inhaled 2 times a day  Dulcolax Laxative 5 mg oral tablet: 4 tab(s) orally once a day  hydroCHLOROthiazide 12.5 mg oral capsule: 1 cap(s) orally once a day  Lasix 40 mg oral tablet: 1 tab(s) orally once a day  Lipitor 20 mg oral tablet: 1 tab(s) orally once a day (at bedtime)  lisinopril 10 mg oral tablet: 1 tab(s) orally once a day  MiraLax oral powder for reconstitution: 17 gram(s) orally once a day  montelukast 10 mg oral tablet: 1 tab(s) orally once a day  pantoprazole 40 mg oral delayed release tablet: 1 tab(s) orally every 12 hours  Potassium Chloride (Eqv-Klor-Con M20) 20 mEq oral tablet, extended release: 1 tab(s) orally once a day  simethicone 80 mg oral tablet, chewable: 1 tab(s) chewed once a day  tiotropium 2.5 mcg/inh inhalation aerosol: 2 puff(s) inhaled once a day

## 2025-05-16 NOTE — DISCHARGE NOTE NURSING/CASE MANAGEMENT/SOCIAL WORK - PATIENT PORTAL LINK FT
You can access the FollowMyHealth Patient Portal offered by [facility] by registering at the following website: [email or web address]. By joining [employer]’s FollowMyHealth portal, you will also be able to view your health information using other applications (apps) compatible with our system.

## 2025-05-16 NOTE — DISCHARGE NOTE PROVIDER - HOSPITAL COURSE
Reason for Admission: LE edema  History of Present Illness:   This is a case of 69-year-old female with a past medical history of  HFpEF, bipolar disorder, asthma/COPD( on home O2) who presents to the ED with increasing LE swelling over the past few days.  As per patient she is coming from [facility] and was seen by her  today who noted the swelling. She also complain s of SOB. She states that she does have pain bilaterally to her lower extremities.  Denies any CP, fevers, chills, nause, vomitting, sick contacts and increased sputum production.    In the ED,  Vital Signs Last 24 Hrs  T(C): 36.7 (14 May 2025 10:11), Max: 36.7 (14 May 2025 10:11)  T(F): 98.1 (14 May 2025 10:11), Max: 98.1 (14 May 2025 10:11)  HR: 83 (14 May 2025 10:11) (83 - 83)  BP: 134/83 (14 May 2025 10:11) (134/83 - 134/83)  BP(mean): --  RR: 17 (14 May 2025 10:11) (17 - 17)  SpO2: 99% (14 May 2025 10:11) (99% - 99%)    Parameters below as of 14 May 2025 10:11  Patient On (Oxygen Delivery Method): nasal cannula  O2 Flow (L/min): 4    Labs significant for a VBG with normal Ph and CO2 of 74 appears to be chronic)    CXR showing mildly increased congestion     Assessment  This is a case of 69-year-old female with a past medical history of  HFpEF, bipolar disorder, asthma/COPD( on home O2) who presents to the ED with increasing LE swelling over the past few days. She was admitted for a possible exacerbation of HF.      Plan  # CHF exacerbation likely 2/2 dietary non compliance.   # LE Edema vs Venostasis   - Pt states that she has hyponatremia and that a doctor told her to increase her salt intake. More likely dilutional hyponatremia from fluid overload.  - TTE 5/5/24 Normal global left ventricular systolic function with a biplane EF of 60%. Mild (grade 1) diastolic dysfunction. No regional wall motion   abnormalities  - Only on HCTZ at home   - Low suspicion for cellulitis more likely chronic changes, hold off abx for now  - BCX- PENDING  - Compression stockings and wound care eval  - wound care: Cleanse bilateral lower legs with soap and water. Pat dry apply Dermaphor, leave open to air twice a day and prn for soiling.   - Duplex BLE: No evidence of deep venous thrombosis in either lower extremity. Bilateral calf veins not visualized Bilateral inguinal lymphadenopathy present  - Fluid restriction and target net neg  - low Na diet  - cw IV lasix 40mg daily for now and can DC on PO lasix 40 depending on clinical progression.    #COPD not in exacerbation  -Patient uses 2L O2 at home as needed. No increase O2 requirements.   -c/w inhalers, Albuterol as needed. Standing LABA/ICS and LAMA  - Seems like a chronic retainer of CO2  - IF AMS get stat ABG and place on BIPAP    #HTN  -c/w home meds    #Bipolar disorder  -c/w Abilify / Artane          Reason for Admission: LE edema  History of Present Illness:   This is a case of 69-year-old female with a past medical history of  HFpEF, bipolar disorder, asthma/COPD( on home O2) who presents to the ED with increasing LE swelling over the past few days.  As per patient she is coming from [facility] and was seen by her  today who noted the swelling. She also complain s of SOB. She states that she does have pain bilaterally to her lower extremities.  Denies any CP, fevers, chills, nause, vomitting, sick contacts and increased sputum production.    In the ED,  Vital Signs Last 24 Hrs  T(C): 36.7 (14 May 2025 10:11), Max: 36.7 (14 May 2025 10:11)  T(F): 98.1 (14 May 2025 10:11), Max: 98.1 (14 May 2025 10:11)  HR: 83 (14 May 2025 10:11) (83 - 83)  BP: 134/83 (14 May 2025 10:11) (134/83 - 134/83)  RR: 17 (14 May 2025 10:11) (17 - 17)  SpO2: 99% (14 May 2025 10:11) (99% - 99%)    Parameters below as of 14 May 2025 10:11  Patient On (Oxygen Delivery Method): nasal cannula  O2 Flow (L/min): 4    Labs significant for a VBG with normal Ph and CO2 of 74 appears to be chronic)    CXR showing mildly increased congestion     Assessment  This is a case of 69-year-old female with a past medical history of  HFpEF, bipolar disorder, asthma/COPD( on home O2) who presents to the ED with increasing LE swelling over the past few days. She was admitted for a possible exacerbation of HF.      Plan  # CHF exacerbation likely 2/2 dietary non compliance.   # LE Edema vs Venostasis , chronic lymphedema   - Pt states that she has hyponatremia and that a doctor told her to increase her salt intake. More likely dilutional hyponatremia from fluid overload.  - TTE 5/5/24 Normal global left ventricular systolic function with a biplane EF of 60%. Mild (grade 1) diastolic dysfunction. No regional wall motion   abnormalities  - Only on HCTZ at home   - Low suspicion for cellulitis more likely chronic changes, hold off abx for now  - BCX- PENDING  - Compression stockings and wound care eval  - wound care: Cleanse bilateral lower legs with soap and water. Pat dry apply Dermaphor, leave open to air twice a day and prn for soiling.   - Duplex BLE: No evidence of deep venous thrombosis in either lower extremity. Bilateral calf veins not visualized Bilateral inguinal lymphadenopathy present  - Fluid restriction and target net neg  - low Na diet  - cw IV lasix 40mg daily for now and can DC on PO lasix 40 depending on clinical progression.    #COPD not in exacerbation  -Patient uses 2L O2 at home as needed. No increase O2 requirements.   -c/w inhalers, Albuterol as needed. Standing LABA/ICS and LAMA  - Seems like a chronic retainer of CO2  - IF AMS get stat ABG and place on BIPAP    #HTN  -c/w home meds    #Bipolar disorder  -c/w Abilify / Artane

## 2025-05-19 LAB
CULTURE RESULTS: SIGNIFICANT CHANGE UP
CULTURE RESULTS: SIGNIFICANT CHANGE UP
SPECIMEN SOURCE: SIGNIFICANT CHANGE UP
SPECIMEN SOURCE: SIGNIFICANT CHANGE UP

## 2025-05-20 DIAGNOSIS — J96.02 ACUTE RESPIRATORY FAILURE WITH HYPERCAPNIA: ICD-10-CM

## 2025-05-20 DIAGNOSIS — J44.89 OTHER SPECIFIED CHRONIC OBSTRUCTIVE PULMONARY DISEASE: ICD-10-CM

## 2025-05-20 DIAGNOSIS — I89.0 LYMPHEDEMA, NOT ELSEWHERE CLASSIFIED: ICD-10-CM

## 2025-05-20 DIAGNOSIS — E87.1 HYPO-OSMOLALITY AND HYPONATREMIA: ICD-10-CM

## 2025-05-20 DIAGNOSIS — Z79.51 LONG TERM (CURRENT) USE OF INHALED STEROIDS: ICD-10-CM

## 2025-05-20 DIAGNOSIS — Z88.0 ALLERGY STATUS TO PENICILLIN: ICD-10-CM

## 2025-05-20 DIAGNOSIS — F31.9 BIPOLAR DISORDER, UNSPECIFIED: ICD-10-CM

## 2025-05-20 DIAGNOSIS — Z91.118 PATIENT'S NONCOMPLIANCE WITH DIETARY REGIMEN FOR OTHER REASON: ICD-10-CM

## 2025-05-20 DIAGNOSIS — I50.33 ACUTE ON CHRONIC DIASTOLIC (CONGESTIVE) HEART FAILURE: ICD-10-CM

## 2025-05-20 DIAGNOSIS — Z91.012 ALLERGY TO EGGS: ICD-10-CM

## 2025-05-20 DIAGNOSIS — I11.0 HYPERTENSIVE HEART DISEASE WITH HEART FAILURE: ICD-10-CM

## 2025-05-20 DIAGNOSIS — I87.2 VENOUS INSUFFICIENCY (CHRONIC) (PERIPHERAL): ICD-10-CM

## 2025-05-20 DIAGNOSIS — Z99.81 DEPENDENCE ON SUPPLEMENTAL OXYGEN: ICD-10-CM

## 2025-07-17 ENCOUNTER — INPATIENT (INPATIENT)
Facility: HOSPITAL | Age: 70
LOS: 3 days | Discharge: HOME | DRG: 192 | End: 2025-07-21
Attending: STUDENT IN AN ORGANIZED HEALTH CARE EDUCATION/TRAINING PROGRAM | Admitting: INTERNAL MEDICINE
Payer: MEDICARE

## 2025-07-17 VITALS
OXYGEN SATURATION: 95 % | SYSTOLIC BLOOD PRESSURE: 90 MMHG | RESPIRATION RATE: 22 BRPM | HEART RATE: 97 BPM | TEMPERATURE: 98 F | DIASTOLIC BLOOD PRESSURE: 49 MMHG

## 2025-07-17 DIAGNOSIS — J44.1 CHRONIC OBSTRUCTIVE PULMONARY DISEASE WITH (ACUTE) EXACERBATION: ICD-10-CM

## 2025-07-17 DIAGNOSIS — Z98.890 OTHER SPECIFIED POSTPROCEDURAL STATES: Chronic | ICD-10-CM

## 2025-07-17 DIAGNOSIS — Z98.891 HISTORY OF UTERINE SCAR FROM PREVIOUS SURGERY: Chronic | ICD-10-CM

## 2025-07-17 LAB
ALBUMIN SERPL ELPH-MCNC: 3.8 G/DL — SIGNIFICANT CHANGE UP (ref 3.5–5.2)
ALP SERPL-CCNC: 124 U/L — HIGH (ref 30–115)
ALT FLD-CCNC: 10 U/L — SIGNIFICANT CHANGE UP (ref 0–41)
ANION GAP SERPL CALC-SCNC: 12 MMOL/L — SIGNIFICANT CHANGE UP (ref 7–14)
AST SERPL-CCNC: 16 U/L — SIGNIFICANT CHANGE UP (ref 0–41)
BASOPHILS # BLD AUTO: 0.05 K/UL — SIGNIFICANT CHANGE UP (ref 0–0.2)
BASOPHILS NFR BLD AUTO: 0.6 % — SIGNIFICANT CHANGE UP (ref 0–2)
BILIRUB SERPL-MCNC: <0.2 MG/DL — SIGNIFICANT CHANGE UP (ref 0.2–1.2)
BUN SERPL-MCNC: 8 MG/DL — LOW (ref 10–20)
CALCIUM SERPL-MCNC: 9.2 MG/DL — SIGNIFICANT CHANGE UP (ref 8.4–10.5)
CHLORIDE SERPL-SCNC: 92 MMOL/L — LOW (ref 98–110)
CO2 SERPL-SCNC: 27 MMOL/L — SIGNIFICANT CHANGE UP (ref 17–32)
CREAT SERPL-MCNC: 0.5 MG/DL — LOW (ref 0.7–1.5)
EGFR: 101 ML/MIN/1.73M2 — SIGNIFICANT CHANGE UP
EGFR: 101 ML/MIN/1.73M2 — SIGNIFICANT CHANGE UP
EOSINOPHIL # BLD AUTO: 0.17 K/UL — SIGNIFICANT CHANGE UP (ref 0–0.5)
EOSINOPHIL NFR BLD AUTO: 1.9 % — SIGNIFICANT CHANGE UP (ref 0–6)
GAS PNL BLDV: SIGNIFICANT CHANGE UP
GLUCOSE SERPL-MCNC: 91 MG/DL — SIGNIFICANT CHANGE UP (ref 70–99)
HCT VFR BLD CALC: 35 % — SIGNIFICANT CHANGE UP (ref 34.5–45)
HGB BLD-MCNC: 10.9 G/DL — LOW (ref 11.5–15.5)
IMM GRANULOCYTES # BLD AUTO: 0.05 K/UL — SIGNIFICANT CHANGE UP (ref 0–0.07)
IMM GRANULOCYTES NFR BLD AUTO: 0.6 % — SIGNIFICANT CHANGE UP (ref 0–0.9)
LYMPHOCYTES # BLD AUTO: 1.51 K/UL — SIGNIFICANT CHANGE UP (ref 1–3.3)
LYMPHOCYTES NFR BLD AUTO: 16.9 % — SIGNIFICANT CHANGE UP (ref 13–44)
MCHC RBC-ENTMCNC: 23.6 PG — LOW (ref 27–34)
MCHC RBC-ENTMCNC: 31.1 G/DL — LOW (ref 32–36)
MCV RBC AUTO: 75.8 FL — LOW (ref 80–100)
MONOCYTES # BLD AUTO: 0.66 K/UL — SIGNIFICANT CHANGE UP (ref 0–0.9)
MONOCYTES NFR BLD AUTO: 7.4 % — SIGNIFICANT CHANGE UP (ref 2–14)
NEUTROPHILS # BLD AUTO: 6.5 K/UL — SIGNIFICANT CHANGE UP (ref 1.8–7.4)
NEUTROPHILS NFR BLD AUTO: 72.6 % — SIGNIFICANT CHANGE UP (ref 43–77)
NRBC # BLD AUTO: 0 K/UL — SIGNIFICANT CHANGE UP (ref 0–0)
NRBC # FLD: 0 K/UL — SIGNIFICANT CHANGE UP (ref 0–0)
NRBC BLD AUTO-RTO: 0 /100 WBCS — SIGNIFICANT CHANGE UP (ref 0–0)
NT-PROBNP SERPL-SCNC: 189 PG/ML — SIGNIFICANT CHANGE UP (ref 0–300)
PLATELET # BLD AUTO: 276 K/UL — SIGNIFICANT CHANGE UP (ref 150–400)
PMV BLD: 9.5 FL — SIGNIFICANT CHANGE UP (ref 7–13)
POTASSIUM SERPL-MCNC: 4.6 MMOL/L — SIGNIFICANT CHANGE UP (ref 3.5–5)
POTASSIUM SERPL-SCNC: 4.6 MMOL/L — SIGNIFICANT CHANGE UP (ref 3.5–5)
PROT SERPL-MCNC: 6.2 G/DL — SIGNIFICANT CHANGE UP (ref 6–8)
RBC # BLD: 4.62 M/UL — SIGNIFICANT CHANGE UP (ref 3.8–5.2)
RBC # FLD: 18.2 % — HIGH (ref 10.3–14.5)
SODIUM SERPL-SCNC: 131 MMOL/L — LOW (ref 135–146)
TROPONIN T, HIGH SENSITIVITY RESULT: 13 NG/L — SIGNIFICANT CHANGE UP (ref 6–13)
WBC # BLD: 8.94 K/UL — SIGNIFICANT CHANGE UP (ref 3.8–10.5)
WBC # FLD AUTO: 8.94 K/UL — SIGNIFICANT CHANGE UP (ref 3.8–10.5)

## 2025-07-17 PROCEDURE — 94660 CPAP INITIATION&MGMT: CPT

## 2025-07-17 PROCEDURE — 93308 TTE F-UP OR LMTD: CPT | Mod: 26

## 2025-07-17 PROCEDURE — 0225U NFCT DS DNA&RNA 21 SARSCOV2: CPT

## 2025-07-17 PROCEDURE — 93306 TTE W/DOPPLER COMPLETE: CPT

## 2025-07-17 PROCEDURE — 71250 CT THORAX DX C-: CPT

## 2025-07-17 PROCEDURE — 80048 BASIC METABOLIC PNL TOTAL CA: CPT

## 2025-07-17 PROCEDURE — 71045 X-RAY EXAM CHEST 1 VIEW: CPT | Mod: 26

## 2025-07-17 PROCEDURE — 84100 ASSAY OF PHOSPHORUS: CPT

## 2025-07-17 PROCEDURE — 93005 ELECTROCARDIOGRAM TRACING: CPT

## 2025-07-17 PROCEDURE — 99223 1ST HOSP IP/OBS HIGH 75: CPT

## 2025-07-17 PROCEDURE — 85027 COMPLETE CBC AUTOMATED: CPT

## 2025-07-17 PROCEDURE — 36415 COLL VENOUS BLD VENIPUNCTURE: CPT

## 2025-07-17 PROCEDURE — 94640 AIRWAY INHALATION TREATMENT: CPT

## 2025-07-17 PROCEDURE — 83735 ASSAY OF MAGNESIUM: CPT

## 2025-07-17 PROCEDURE — 99285 EMERGENCY DEPT VISIT HI MDM: CPT

## 2025-07-17 PROCEDURE — 76604 US EXAM CHEST: CPT | Mod: 26

## 2025-07-17 RX ORDER — ALBUTEROL SULFATE 2.5 MG/3ML
2.5 VIAL, NEBULIZER (ML) INHALATION ONCE
Refills: 0 | Status: DISCONTINUED | OUTPATIENT
Start: 2025-07-17 | End: 2025-07-17

## 2025-07-17 RX ORDER — MELATONIN 5 MG
3 TABLET ORAL AT BEDTIME
Refills: 0 | Status: DISCONTINUED | OUTPATIENT
Start: 2025-07-17 | End: 2025-07-21

## 2025-07-17 RX ORDER — IPRATROPIUM BROMIDE AND ALBUTEROL SULFATE .5; 2.5 MG/3ML; MG/3ML
3 SOLUTION RESPIRATORY (INHALATION) EVERY 6 HOURS
Refills: 0 | Status: DISCONTINUED | OUTPATIENT
Start: 2025-07-17 | End: 2025-07-18

## 2025-07-17 RX ORDER — TRIHEXYPHENIDYL HYDROCHLORIDE 2 MG/1
2 TABLET ORAL
Refills: 0 | Status: DISCONTINUED | OUTPATIENT
Start: 2025-07-17 | End: 2025-07-21

## 2025-07-17 RX ORDER — METHYLPREDNISOLONE ACETATE 80 MG/ML
40 INJECTION, SUSPENSION INTRA-ARTICULAR; INTRALESIONAL; INTRAMUSCULAR; SOFT TISSUE EVERY 8 HOURS
Refills: 0 | Status: DISCONTINUED | OUTPATIENT
Start: 2025-07-17 | End: 2025-07-20

## 2025-07-17 RX ORDER — ARIPIPRAZOLE 2 MG/1
5 TABLET ORAL DAILY
Refills: 0 | Status: DISCONTINUED | OUTPATIENT
Start: 2025-07-17 | End: 2025-07-21

## 2025-07-17 RX ORDER — FUROSEMIDE 10 MG/ML
20 INJECTION INTRAMUSCULAR; INTRAVENOUS DAILY
Refills: 0 | Status: DISCONTINUED | OUTPATIENT
Start: 2025-07-17 | End: 2025-07-20

## 2025-07-17 RX ORDER — MAGNESIUM, ALUMINUM HYDROXIDE 200-200 MG
30 TABLET,CHEWABLE ORAL EVERY 4 HOURS
Refills: 0 | Status: DISCONTINUED | OUTPATIENT
Start: 2025-07-17 | End: 2025-07-21

## 2025-07-17 RX ORDER — LISINOPRIL 5 MG/1
10 TABLET ORAL DAILY
Refills: 0 | Status: DISCONTINUED | OUTPATIENT
Start: 2025-07-17 | End: 2025-07-18

## 2025-07-17 RX ORDER — ONDANSETRON HCL/PF 4 MG/2 ML
4 VIAL (ML) INJECTION EVERY 8 HOURS
Refills: 0 | Status: DISCONTINUED | OUTPATIENT
Start: 2025-07-17 | End: 2025-07-21

## 2025-07-17 RX ORDER — ENOXAPARIN SODIUM 100 MG/ML
40 INJECTION SUBCUTANEOUS EVERY 24 HOURS
Refills: 0 | Status: DISCONTINUED | OUTPATIENT
Start: 2025-07-17 | End: 2025-07-21

## 2025-07-17 RX ORDER — MONTELUKAST SODIUM 10 MG/1
10 TABLET ORAL DAILY
Refills: 0 | Status: DISCONTINUED | OUTPATIENT
Start: 2025-07-17 | End: 2025-07-21

## 2025-07-17 RX ORDER — IPRATROPIUM BROMIDE AND ALBUTEROL SULFATE .5; 2.5 MG/3ML; MG/3ML
3 SOLUTION RESPIRATORY (INHALATION) ONCE
Refills: 0 | Status: COMPLETED | OUTPATIENT
Start: 2025-07-17 | End: 2025-07-17

## 2025-07-17 RX ORDER — METHYLPREDNISOLONE ACETATE 80 MG/ML
125 INJECTION, SUSPENSION INTRA-ARTICULAR; INTRALESIONAL; INTRAMUSCULAR; SOFT TISSUE ONCE
Refills: 0 | Status: COMPLETED | OUTPATIENT
Start: 2025-07-17 | End: 2025-07-17

## 2025-07-17 RX ORDER — IPRATROPIUM BROMIDE AND ALBUTEROL SULFATE .5; 2.5 MG/3ML; MG/3ML
3 SOLUTION RESPIRATORY (INHALATION) ONCE
Refills: 0 | Status: COMPLETED | OUTPATIENT
Start: 2025-07-17 | End: 2025-07-18

## 2025-07-17 RX ORDER — ATORVASTATIN CALCIUM 80 MG/1
20 TABLET, FILM COATED ORAL AT BEDTIME
Refills: 0 | Status: DISCONTINUED | OUTPATIENT
Start: 2025-07-17 | End: 2025-07-21

## 2025-07-17 RX ORDER — MAGNESIUM SULFATE 500 MG/ML
2 SYRINGE (ML) INJECTION ONCE
Refills: 0 | Status: COMPLETED | OUTPATIENT
Start: 2025-07-17 | End: 2025-07-17

## 2025-07-17 RX ORDER — ACETAMINOPHEN 500 MG/5ML
650 LIQUID (ML) ORAL EVERY 6 HOURS
Refills: 0 | Status: DISCONTINUED | OUTPATIENT
Start: 2025-07-17 | End: 2025-07-21

## 2025-07-17 RX ADMIN — IPRATROPIUM BROMIDE AND ALBUTEROL SULFATE 3 MILLILITER(S): .5; 2.5 SOLUTION RESPIRATORY (INHALATION) at 18:00

## 2025-07-17 RX ADMIN — Medication 150 GRAM(S): at 18:00

## 2025-07-17 RX ADMIN — METHYLPREDNISOLONE ACETATE 40 MILLIGRAM(S): 80 INJECTION, SUSPENSION INTRA-ARTICULAR; INTRALESIONAL; INTRAMUSCULAR; SOFT TISSUE at 22:14

## 2025-07-17 RX ADMIN — Medication 2 GRAM(S): at 19:15

## 2025-07-17 RX ADMIN — METHYLPREDNISOLONE ACETATE 125 MILLIGRAM(S): 80 INJECTION, SUSPENSION INTRA-ARTICULAR; INTRALESIONAL; INTRAMUSCULAR; SOFT TISSUE at 18:00

## 2025-07-18 DIAGNOSIS — J44.1 CHRONIC OBSTRUCTIVE PULMONARY DISEASE WITH (ACUTE) EXACERBATION: ICD-10-CM

## 2025-07-18 DIAGNOSIS — R06.00 DYSPNEA, UNSPECIFIED: ICD-10-CM

## 2025-07-18 DIAGNOSIS — Z51.5 ENCOUNTER FOR PALLIATIVE CARE: ICD-10-CM

## 2025-07-18 DIAGNOSIS — Z71.89 OTHER SPECIFIED COUNSELING: ICD-10-CM

## 2025-07-18 LAB
ANION GAP SERPL CALC-SCNC: 20 MMOL/L — HIGH (ref 7–14)
ANION GAP SERPL CALC-SCNC: 8 MMOL/L — SIGNIFICANT CHANGE UP (ref 7–14)
BUN SERPL-MCNC: 12 MG/DL — SIGNIFICANT CHANGE UP (ref 10–20)
BUN SERPL-MCNC: 9 MG/DL — LOW (ref 10–20)
CALCIUM SERPL-MCNC: 9.8 MG/DL — SIGNIFICANT CHANGE UP (ref 8.4–10.5)
CALCIUM SERPL-MCNC: 9.8 MG/DL — SIGNIFICANT CHANGE UP (ref 8.4–10.5)
CHLORIDE SERPL-SCNC: 93 MMOL/L — LOW (ref 98–110)
CHLORIDE SERPL-SCNC: 93 MMOL/L — LOW (ref 98–110)
CO2 SERPL-SCNC: 21 MMOL/L — SIGNIFICANT CHANGE UP (ref 17–32)
CO2 SERPL-SCNC: 32 MMOL/L — SIGNIFICANT CHANGE UP (ref 17–32)
CREAT SERPL-MCNC: 0.5 MG/DL — LOW (ref 0.7–1.5)
CREAT SERPL-MCNC: <0.5 MG/DL — LOW (ref 0.7–1.5)
EGFR: 101 ML/MIN/1.73M2 — SIGNIFICANT CHANGE UP
EGFR: 101 ML/MIN/1.73M2 — SIGNIFICANT CHANGE UP
EGFR: 107 ML/MIN/1.73M2 — SIGNIFICANT CHANGE UP
EGFR: 107 ML/MIN/1.73M2 — SIGNIFICANT CHANGE UP
GLUCOSE SERPL-MCNC: 123 MG/DL — HIGH (ref 70–99)
GLUCOSE SERPL-MCNC: 149 MG/DL — HIGH (ref 70–99)
HCT VFR BLD CALC: 40.1 % — SIGNIFICANT CHANGE UP (ref 34.5–45)
HGB BLD-MCNC: 12.2 G/DL — SIGNIFICANT CHANGE UP (ref 11.5–15.5)
MAGNESIUM SERPL-MCNC: 2.3 MG/DL — SIGNIFICANT CHANGE UP (ref 1.8–2.4)
MCHC RBC-ENTMCNC: 23.6 PG — LOW (ref 27–34)
MCHC RBC-ENTMCNC: 30.4 G/DL — LOW (ref 32–36)
MCV RBC AUTO: 77.6 FL — LOW (ref 80–100)
NRBC # BLD AUTO: 0 K/UL — SIGNIFICANT CHANGE UP (ref 0–0)
NRBC # FLD: 0 K/UL — SIGNIFICANT CHANGE UP (ref 0–0)
NRBC BLD AUTO-RTO: 0 /100 WBCS — SIGNIFICANT CHANGE UP (ref 0–0)
PHOSPHATE SERPL-MCNC: 4.5 MG/DL — SIGNIFICANT CHANGE UP (ref 2.1–4.9)
PLATELET # BLD AUTO: 273 K/UL — SIGNIFICANT CHANGE UP (ref 150–400)
PMV BLD: 9.4 FL — SIGNIFICANT CHANGE UP (ref 7–13)
POTASSIUM SERPL-MCNC: 5.6 MMOL/L — HIGH (ref 3.5–5)
POTASSIUM SERPL-MCNC: 5.9 MMOL/L — HIGH (ref 3.5–5)
POTASSIUM SERPL-SCNC: 5.6 MMOL/L — HIGH (ref 3.5–5)
POTASSIUM SERPL-SCNC: 5.9 MMOL/L — HIGH (ref 3.5–5)
RBC # BLD: 5.17 M/UL — SIGNIFICANT CHANGE UP (ref 3.8–5.2)
RBC # FLD: 18.4 % — HIGH (ref 10.3–14.5)
SODIUM SERPL-SCNC: 133 MMOL/L — LOW (ref 135–146)
SODIUM SERPL-SCNC: 134 MMOL/L — LOW (ref 135–146)
WBC # BLD: 7.21 K/UL — SIGNIFICANT CHANGE UP (ref 3.8–10.5)
WBC # FLD AUTO: 7.21 K/UL — SIGNIFICANT CHANGE UP (ref 3.8–10.5)

## 2025-07-18 PROCEDURE — 99222 1ST HOSP IP/OBS MODERATE 55: CPT

## 2025-07-18 PROCEDURE — 99221 1ST HOSP IP/OBS SF/LOW 40: CPT | Mod: GC

## 2025-07-18 PROCEDURE — 71250 CT THORAX DX C-: CPT | Mod: 26

## 2025-07-18 PROCEDURE — 93010 ELECTROCARDIOGRAM REPORT: CPT

## 2025-07-18 PROCEDURE — 99233 SBSQ HOSP IP/OBS HIGH 50: CPT

## 2025-07-18 PROCEDURE — 99223 1ST HOSP IP/OBS HIGH 75: CPT

## 2025-07-18 RX ORDER — ZINC SULFATE 50(220)MG
220 CAPSULE ORAL DAILY
Refills: 0 | Status: DISCONTINUED | OUTPATIENT
Start: 2025-07-18 | End: 2025-07-21

## 2025-07-18 RX ORDER — SODIUM ZIRCONIUM CYCLOSILICATE 5 G/5G
10 POWDER, FOR SUSPENSION ORAL ONCE
Refills: 0 | Status: COMPLETED | OUTPATIENT
Start: 2025-07-18 | End: 2025-07-18

## 2025-07-18 RX ORDER — IPRATROPIUM BROMIDE AND ALBUTEROL SULFATE .5; 2.5 MG/3ML; MG/3ML
3 SOLUTION RESPIRATORY (INHALATION) EVERY 6 HOURS
Refills: 0 | Status: DISCONTINUED | OUTPATIENT
Start: 2025-07-18 | End: 2025-07-21

## 2025-07-18 RX ORDER — BUMETANIDE 1 MG/1
1 TABLET ORAL EVERY 12 HOURS
Refills: 0 | Status: COMPLETED | OUTPATIENT
Start: 2025-07-18 | End: 2025-07-21

## 2025-07-18 RX ADMIN — TRIHEXYPHENIDYL HYDROCHLORIDE 2 MILLIGRAM(S): 2 TABLET ORAL at 06:12

## 2025-07-18 RX ADMIN — Medication 500 MILLIGRAM(S): at 21:38

## 2025-07-18 RX ADMIN — Medication 1 DOSE(S): at 20:36

## 2025-07-18 RX ADMIN — METHYLPREDNISOLONE ACETATE 40 MILLIGRAM(S): 80 INJECTION, SUSPENSION INTRA-ARTICULAR; INTRALESIONAL; INTRAMUSCULAR; SOFT TISSUE at 13:45

## 2025-07-18 RX ADMIN — METHYLPREDNISOLONE ACETATE 40 MILLIGRAM(S): 80 INJECTION, SUSPENSION INTRA-ARTICULAR; INTRALESIONAL; INTRAMUSCULAR; SOFT TISSUE at 06:10

## 2025-07-18 RX ADMIN — ARIPIPRAZOLE 5 MILLIGRAM(S): 2 TABLET ORAL at 11:49

## 2025-07-18 RX ADMIN — IPRATROPIUM BROMIDE AND ALBUTEROL SULFATE 3 MILLILITER(S): .5; 2.5 SOLUTION RESPIRATORY (INHALATION) at 01:03

## 2025-07-18 RX ADMIN — FUROSEMIDE 20 MILLIGRAM(S): 10 INJECTION INTRAMUSCULAR; INTRAVENOUS at 06:12

## 2025-07-18 RX ADMIN — Medication 1 DOSE(S): at 09:43

## 2025-07-18 RX ADMIN — MONTELUKAST SODIUM 10 MILLIGRAM(S): 10 TABLET ORAL at 11:48

## 2025-07-18 RX ADMIN — Medication 40 MILLIGRAM(S): at 06:11

## 2025-07-18 RX ADMIN — LISINOPRIL 10 MILLIGRAM(S): 5 TABLET ORAL at 06:11

## 2025-07-18 RX ADMIN — BUMETANIDE 1 MILLIGRAM(S): 1 TABLET ORAL at 14:37

## 2025-07-18 RX ADMIN — METHYLPREDNISOLONE ACETATE 40 MILLIGRAM(S): 80 INJECTION, SUSPENSION INTRA-ARTICULAR; INTRALESIONAL; INTRAMUSCULAR; SOFT TISSUE at 21:38

## 2025-07-18 RX ADMIN — SODIUM ZIRCONIUM CYCLOSILICATE 10 GRAM(S): 5 POWDER, FOR SUSPENSION ORAL at 21:38

## 2025-07-18 RX ADMIN — IPRATROPIUM BROMIDE AND ALBUTEROL SULFATE 3 MILLILITER(S): .5; 2.5 SOLUTION RESPIRATORY (INHALATION) at 20:03

## 2025-07-18 RX ADMIN — TRIHEXYPHENIDYL HYDROCHLORIDE 2 MILLIGRAM(S): 2 TABLET ORAL at 17:52

## 2025-07-18 RX ADMIN — IPRATROPIUM BROMIDE AND ALBUTEROL SULFATE 3 MILLILITER(S): .5; 2.5 SOLUTION RESPIRATORY (INHALATION) at 13:36

## 2025-07-18 RX ADMIN — Medication 1 APPLICATION(S): at 11:49

## 2025-07-19 LAB
ANION GAP SERPL CALC-SCNC: 12 MMOL/L — SIGNIFICANT CHANGE UP (ref 7–14)
BUN SERPL-MCNC: 14 MG/DL — SIGNIFICANT CHANGE UP (ref 10–20)
CALCIUM SERPL-MCNC: 9.6 MG/DL — SIGNIFICANT CHANGE UP (ref 8.4–10.5)
CHLORIDE SERPL-SCNC: 88 MMOL/L — LOW (ref 98–110)
CO2 SERPL-SCNC: 30 MMOL/L — SIGNIFICANT CHANGE UP (ref 17–32)
CREAT SERPL-MCNC: 0.5 MG/DL — LOW (ref 0.7–1.5)
EGFR: 101 ML/MIN/1.73M2 — SIGNIFICANT CHANGE UP
EGFR: 101 ML/MIN/1.73M2 — SIGNIFICANT CHANGE UP
GLUCOSE SERPL-MCNC: 175 MG/DL — HIGH (ref 70–99)
POTASSIUM SERPL-MCNC: 4.4 MMOL/L — SIGNIFICANT CHANGE UP (ref 3.5–5)
POTASSIUM SERPL-SCNC: 4.4 MMOL/L — SIGNIFICANT CHANGE UP (ref 3.5–5)
RAPID RVP RESULT: SIGNIFICANT CHANGE UP
SARS-COV-2 RNA SPEC QL NAA+PROBE: SIGNIFICANT CHANGE UP
SODIUM SERPL-SCNC: 130 MMOL/L — LOW (ref 135–146)

## 2025-07-19 PROCEDURE — 99223 1ST HOSP IP/OBS HIGH 75: CPT

## 2025-07-19 PROCEDURE — 99233 SBSQ HOSP IP/OBS HIGH 50: CPT

## 2025-07-19 RX ORDER — LISINOPRIL 5 MG/1
10 TABLET ORAL DAILY
Refills: 0 | Status: DISCONTINUED | OUTPATIENT
Start: 2025-07-19 | End: 2025-07-21

## 2025-07-19 RX ADMIN — Medication 1 DOSE(S): at 11:14

## 2025-07-19 RX ADMIN — ATORVASTATIN CALCIUM 20 MILLIGRAM(S): 80 TABLET, FILM COATED ORAL at 22:10

## 2025-07-19 RX ADMIN — Medication 1 DOSE(S): at 20:44

## 2025-07-19 RX ADMIN — TRIHEXYPHENIDYL HYDROCHLORIDE 2 MILLIGRAM(S): 2 TABLET ORAL at 17:01

## 2025-07-19 RX ADMIN — ENOXAPARIN SODIUM 40 MILLIGRAM(S): 100 INJECTION SUBCUTANEOUS at 11:14

## 2025-07-19 RX ADMIN — IPRATROPIUM BROMIDE AND ALBUTEROL SULFATE 3 MILLILITER(S): .5; 2.5 SOLUTION RESPIRATORY (INHALATION) at 02:05

## 2025-07-19 RX ADMIN — BUMETANIDE 1 MILLIGRAM(S): 1 TABLET ORAL at 17:01

## 2025-07-19 RX ADMIN — IPRATROPIUM BROMIDE AND ALBUTEROL SULFATE 3 MILLILITER(S): .5; 2.5 SOLUTION RESPIRATORY (INHALATION) at 19:57

## 2025-07-19 RX ADMIN — Medication 500 MILLIGRAM(S): at 22:10

## 2025-07-19 RX ADMIN — IPRATROPIUM BROMIDE AND ALBUTEROL SULFATE 3 MILLILITER(S): .5; 2.5 SOLUTION RESPIRATORY (INHALATION) at 09:41

## 2025-07-19 RX ADMIN — MONTELUKAST SODIUM 10 MILLIGRAM(S): 10 TABLET ORAL at 11:15

## 2025-07-19 RX ADMIN — LISINOPRIL 10 MILLIGRAM(S): 5 TABLET ORAL at 05:28

## 2025-07-19 RX ADMIN — BUMETANIDE 1 MILLIGRAM(S): 1 TABLET ORAL at 05:28

## 2025-07-19 RX ADMIN — METHYLPREDNISOLONE ACETATE 40 MILLIGRAM(S): 80 INJECTION, SUSPENSION INTRA-ARTICULAR; INTRALESIONAL; INTRAMUSCULAR; SOFT TISSUE at 22:10

## 2025-07-19 RX ADMIN — Medication 220 MILLIGRAM(S): at 11:15

## 2025-07-19 RX ADMIN — TRIHEXYPHENIDYL HYDROCHLORIDE 2 MILLIGRAM(S): 2 TABLET ORAL at 05:27

## 2025-07-19 RX ADMIN — METHYLPREDNISOLONE ACETATE 40 MILLIGRAM(S): 80 INJECTION, SUSPENSION INTRA-ARTICULAR; INTRALESIONAL; INTRAMUSCULAR; SOFT TISSUE at 13:44

## 2025-07-19 RX ADMIN — Medication 40 MILLIGRAM(S): at 05:28

## 2025-07-19 RX ADMIN — Medication 500 MILLIGRAM(S): at 05:28

## 2025-07-19 RX ADMIN — IPRATROPIUM BROMIDE AND ALBUTEROL SULFATE 3 MILLILITER(S): .5; 2.5 SOLUTION RESPIRATORY (INHALATION) at 13:57

## 2025-07-19 RX ADMIN — Medication 650 MILLIGRAM(S): at 07:22

## 2025-07-19 RX ADMIN — METHYLPREDNISOLONE ACETATE 40 MILLIGRAM(S): 80 INJECTION, SUSPENSION INTRA-ARTICULAR; INTRALESIONAL; INTRAMUSCULAR; SOFT TISSUE at 05:27

## 2025-07-19 RX ADMIN — Medication 1 APPLICATION(S): at 11:16

## 2025-07-19 RX ADMIN — FUROSEMIDE 20 MILLIGRAM(S): 10 INJECTION INTRAMUSCULAR; INTRAVENOUS at 05:28

## 2025-07-19 RX ADMIN — Medication 650 MILLIGRAM(S): at 06:57

## 2025-07-19 RX ADMIN — ARIPIPRAZOLE 5 MILLIGRAM(S): 2 TABLET ORAL at 11:15

## 2025-07-19 RX ADMIN — Medication 500 MILLIGRAM(S): at 13:44

## 2025-07-20 PROCEDURE — 99233 SBSQ HOSP IP/OBS HIGH 50: CPT

## 2025-07-20 RX ORDER — BUMETANIDE 1 MG/1
1 TABLET ORAL DAILY
Refills: 0 | Status: DISCONTINUED | OUTPATIENT
Start: 2025-07-20 | End: 2025-07-20

## 2025-07-20 RX ORDER — PREDNISONE 20 MG/1
20 TABLET ORAL DAILY
Refills: 0 | Status: DISCONTINUED | OUTPATIENT
Start: 2025-07-21 | End: 2025-07-21

## 2025-07-20 RX ADMIN — BUMETANIDE 1 MILLIGRAM(S): 1 TABLET ORAL at 17:38

## 2025-07-20 RX ADMIN — MONTELUKAST SODIUM 10 MILLIGRAM(S): 10 TABLET ORAL at 09:05

## 2025-07-20 RX ADMIN — BUMETANIDE 1 MILLIGRAM(S): 1 TABLET ORAL at 06:31

## 2025-07-20 RX ADMIN — ENOXAPARIN SODIUM 40 MILLIGRAM(S): 100 INJECTION SUBCUTANEOUS at 09:04

## 2025-07-20 RX ADMIN — Medication 220 MILLIGRAM(S): at 09:03

## 2025-07-20 RX ADMIN — FUROSEMIDE 20 MILLIGRAM(S): 10 INJECTION INTRAMUSCULAR; INTRAVENOUS at 06:30

## 2025-07-20 RX ADMIN — TRIHEXYPHENIDYL HYDROCHLORIDE 2 MILLIGRAM(S): 2 TABLET ORAL at 17:38

## 2025-07-20 RX ADMIN — Medication 40 MILLIGRAM(S): at 06:31

## 2025-07-20 RX ADMIN — Medication 1 DOSE(S): at 21:21

## 2025-07-20 RX ADMIN — IPRATROPIUM BROMIDE AND ALBUTEROL SULFATE 3 MILLILITER(S): .5; 2.5 SOLUTION RESPIRATORY (INHALATION) at 20:03

## 2025-07-20 RX ADMIN — Medication 1 DOSE(S): at 09:57

## 2025-07-20 RX ADMIN — IPRATROPIUM BROMIDE AND ALBUTEROL SULFATE 3 MILLILITER(S): .5; 2.5 SOLUTION RESPIRATORY (INHALATION) at 05:03

## 2025-07-20 RX ADMIN — METHYLPREDNISOLONE ACETATE 40 MILLIGRAM(S): 80 INJECTION, SUSPENSION INTRA-ARTICULAR; INTRALESIONAL; INTRAMUSCULAR; SOFT TISSUE at 14:23

## 2025-07-20 RX ADMIN — IPRATROPIUM BROMIDE AND ALBUTEROL SULFATE 3 MILLILITER(S): .5; 2.5 SOLUTION RESPIRATORY (INHALATION) at 08:57

## 2025-07-20 RX ADMIN — LISINOPRIL 10 MILLIGRAM(S): 5 TABLET ORAL at 06:30

## 2025-07-20 RX ADMIN — IPRATROPIUM BROMIDE AND ALBUTEROL SULFATE 3 MILLILITER(S): .5; 2.5 SOLUTION RESPIRATORY (INHALATION) at 13:48

## 2025-07-20 RX ADMIN — Medication 1 APPLICATION(S): at 14:22

## 2025-07-20 RX ADMIN — METHYLPREDNISOLONE ACETATE 40 MILLIGRAM(S): 80 INJECTION, SUSPENSION INTRA-ARTICULAR; INTRALESIONAL; INTRAMUSCULAR; SOFT TISSUE at 06:31

## 2025-07-20 RX ADMIN — Medication 500 MILLIGRAM(S): at 06:31

## 2025-07-20 RX ADMIN — ARIPIPRAZOLE 5 MILLIGRAM(S): 2 TABLET ORAL at 14:21

## 2025-07-20 RX ADMIN — Medication 500 MILLIGRAM(S): at 21:22

## 2025-07-20 RX ADMIN — TRIHEXYPHENIDYL HYDROCHLORIDE 2 MILLIGRAM(S): 2 TABLET ORAL at 06:30

## 2025-07-20 RX ADMIN — Medication 500 MILLIGRAM(S): at 14:20

## 2025-07-20 RX ADMIN — ATORVASTATIN CALCIUM 20 MILLIGRAM(S): 80 TABLET, FILM COATED ORAL at 21:23

## 2025-07-21 ENCOUNTER — TRANSCRIPTION ENCOUNTER (OUTPATIENT)
Age: 70
End: 2025-07-21

## 2025-07-21 VITALS — OXYGEN SATURATION: 92 %

## 2025-07-21 LAB
ANION GAP SERPL CALC-SCNC: 14 MMOL/L — SIGNIFICANT CHANGE UP (ref 7–14)
BUN SERPL-MCNC: 17 MG/DL — SIGNIFICANT CHANGE UP (ref 10–20)
CALCIUM SERPL-MCNC: 9.3 MG/DL — SIGNIFICANT CHANGE UP (ref 8.4–10.5)
CHLORIDE SERPL-SCNC: 86 MMOL/L — LOW (ref 98–110)
CO2 SERPL-SCNC: 36 MMOL/L — HIGH (ref 17–32)
CREAT SERPL-MCNC: 0.6 MG/DL — LOW (ref 0.7–1.5)
EGFR: 97 ML/MIN/1.73M2 — SIGNIFICANT CHANGE UP
EGFR: 97 ML/MIN/1.73M2 — SIGNIFICANT CHANGE UP
GLUCOSE SERPL-MCNC: 134 MG/DL — HIGH (ref 70–99)
POTASSIUM SERPL-MCNC: 4.2 MMOL/L — SIGNIFICANT CHANGE UP (ref 3.5–5)
POTASSIUM SERPL-SCNC: 4.2 MMOL/L — SIGNIFICANT CHANGE UP (ref 3.5–5)
SODIUM SERPL-SCNC: 136 MMOL/L — SIGNIFICANT CHANGE UP (ref 135–146)

## 2025-07-21 PROCEDURE — 99239 HOSP IP/OBS DSCHRG MGMT >30: CPT

## 2025-07-21 RX ORDER — PREDNISONE 20 MG/1
20 TABLET ORAL ONCE
Refills: 0 | Status: COMPLETED | OUTPATIENT
Start: 2025-07-21 | End: 2025-07-21

## 2025-07-21 RX ORDER — ZINC SULFATE 50(220)MG
1 CAPSULE ORAL
Qty: 30 | Refills: 0
Start: 2025-07-21 | End: 2025-08-19

## 2025-07-21 RX ORDER — PREDNISONE 20 MG/1
40 TABLET ORAL DAILY
Refills: 0 | Status: DISCONTINUED | OUTPATIENT
Start: 2025-07-22 | End: 2025-07-21

## 2025-07-21 RX ORDER — PREDNISONE 20 MG/1
2 TABLET ORAL
Qty: 10 | Refills: 0
Start: 2025-07-21 | End: 2025-07-25

## 2025-07-21 RX ORDER — BUMETANIDE 1 MG/1
1 TABLET ORAL
Qty: 30 | Refills: 0
Start: 2025-07-21 | End: 2025-08-19

## 2025-07-21 RX ORDER — BUMETANIDE 1 MG/1
1 TABLET ORAL DAILY
Refills: 0 | Status: DISCONTINUED | OUTPATIENT
Start: 2025-07-21 | End: 2025-07-21

## 2025-07-21 RX ADMIN — LISINOPRIL 10 MILLIGRAM(S): 5 TABLET ORAL at 05:38

## 2025-07-21 RX ADMIN — Medication 500 MILLIGRAM(S): at 05:37

## 2025-07-21 RX ADMIN — IPRATROPIUM BROMIDE AND ALBUTEROL SULFATE 3 MILLILITER(S): .5; 2.5 SOLUTION RESPIRATORY (INHALATION) at 07:21

## 2025-07-21 RX ADMIN — Medication 1 APPLICATION(S): at 14:44

## 2025-07-21 RX ADMIN — BUMETANIDE 1 MILLIGRAM(S): 1 TABLET ORAL at 14:47

## 2025-07-21 RX ADMIN — ARIPIPRAZOLE 5 MILLIGRAM(S): 2 TABLET ORAL at 08:50

## 2025-07-21 RX ADMIN — MONTELUKAST SODIUM 10 MILLIGRAM(S): 10 TABLET ORAL at 08:50

## 2025-07-21 RX ADMIN — Medication 40 MILLIGRAM(S): at 05:38

## 2025-07-21 RX ADMIN — IPRATROPIUM BROMIDE AND ALBUTEROL SULFATE 3 MILLILITER(S): .5; 2.5 SOLUTION RESPIRATORY (INHALATION) at 04:19

## 2025-07-21 RX ADMIN — Medication 220 MILLIGRAM(S): at 08:50

## 2025-07-21 RX ADMIN — ENOXAPARIN SODIUM 40 MILLIGRAM(S): 100 INJECTION SUBCUTANEOUS at 08:52

## 2025-07-21 RX ADMIN — Medication 500 MILLIGRAM(S): at 14:43

## 2025-07-21 RX ADMIN — PREDNISONE 20 MILLIGRAM(S): 20 TABLET ORAL at 05:38

## 2025-07-21 RX ADMIN — BUMETANIDE 1 MILLIGRAM(S): 1 TABLET ORAL at 05:38

## 2025-07-21 RX ADMIN — TRIHEXYPHENIDYL HYDROCHLORIDE 2 MILLIGRAM(S): 2 TABLET ORAL at 05:37

## 2025-07-21 RX ADMIN — PREDNISONE 20 MILLIGRAM(S): 20 TABLET ORAL at 14:46

## 2025-07-21 RX ADMIN — IPRATROPIUM BROMIDE AND ALBUTEROL SULFATE 3 MILLILITER(S): .5; 2.5 SOLUTION RESPIRATORY (INHALATION) at 14:17

## 2025-07-21 RX ADMIN — TRIHEXYPHENIDYL HYDROCHLORIDE 2 MILLIGRAM(S): 2 TABLET ORAL at 17:14

## 2025-07-29 DIAGNOSIS — J96.22 ACUTE AND CHRONIC RESPIRATORY FAILURE WITH HYPERCAPNIA: ICD-10-CM

## 2025-07-29 DIAGNOSIS — J98.01 ACUTE BRONCHOSPASM: ICD-10-CM

## 2025-07-29 DIAGNOSIS — F17.210 NICOTINE DEPENDENCE, CIGARETTES, UNCOMPLICATED: ICD-10-CM

## 2025-07-29 DIAGNOSIS — L60.3 NAIL DYSTROPHY: ICD-10-CM

## 2025-07-29 DIAGNOSIS — Z99.81 DEPENDENCE ON SUPPLEMENTAL OXYGEN: ICD-10-CM

## 2025-07-29 DIAGNOSIS — J45.909 UNSPECIFIED ASTHMA, UNCOMPLICATED: ICD-10-CM

## 2025-07-29 DIAGNOSIS — F31.9 BIPOLAR DISORDER, UNSPECIFIED: ICD-10-CM

## 2025-07-29 DIAGNOSIS — I11.0 HYPERTENSIVE HEART DISEASE WITH HEART FAILURE: ICD-10-CM

## 2025-07-29 DIAGNOSIS — I50.32 CHRONIC DIASTOLIC (CONGESTIVE) HEART FAILURE: ICD-10-CM

## 2025-07-29 DIAGNOSIS — Z88.0 ALLERGY STATUS TO PENICILLIN: ICD-10-CM

## 2025-07-29 DIAGNOSIS — B35.1 TINEA UNGUIUM: ICD-10-CM

## 2025-07-29 DIAGNOSIS — E87.1 HYPO-OSMOLALITY AND HYPONATREMIA: ICD-10-CM

## 2025-07-29 DIAGNOSIS — J44.1 CHRONIC OBSTRUCTIVE PULMONARY DISEASE WITH (ACUTE) EXACERBATION: ICD-10-CM

## 2025-07-29 DIAGNOSIS — I87.2 VENOUS INSUFFICIENCY (CHRONIC) (PERIPHERAL): ICD-10-CM

## 2025-08-08 NOTE — ED PROVIDER NOTE - PHYSICAL EXAMINATION
Take medication: As needed only.   Preventive Measures:   Avoid spicy, acidic, fried foods and alcohol.  Eat 2-3 hours before going to bed. Remain upright for 30-60 minutes after eating.   Avoid tight clothing, chew food thoroughly.  Reduce caffeine intake, avoid soda.     VITAL SIGNS: I have reviewed nursing notes and confirm.  CONSTITUTIONAL:  non-toxic, NAD  SKIN: Warm dry, normal skin turgor  HEAD: NCAT  CARD: RRR, no murmurs, rubs or gallops  RESP: + wheezing, diminished, no resp distress.   ABD: soft,  non-tender, non-distended, no rebound or guarding.   EXT: + lymphedema to bilateral lower ext  PSYCH: Cooperative, appropriate.